# Patient Record
Sex: FEMALE | Race: WHITE
[De-identification: names, ages, dates, MRNs, and addresses within clinical notes are randomized per-mention and may not be internally consistent; named-entity substitution may affect disease eponyms.]

---

## 2020-09-28 ENCOUNTER — APPOINTMENT (OUTPATIENT)
Dept: FAMILY MEDICINE | Facility: CLINIC | Age: 25
End: 2020-09-28
Payer: COMMERCIAL

## 2020-09-28 VITALS
WEIGHT: 212 LBS | TEMPERATURE: 98 F | SYSTOLIC BLOOD PRESSURE: 118 MMHG | HEART RATE: 99 BPM | DIASTOLIC BLOOD PRESSURE: 70 MMHG | HEIGHT: 64 IN | BODY MASS INDEX: 36.19 KG/M2 | OXYGEN SATURATION: 98 %

## 2020-09-28 DIAGNOSIS — R63.5 ABNORMAL WEIGHT GAIN: ICD-10-CM

## 2020-09-28 DIAGNOSIS — Z23 ENCOUNTER FOR IMMUNIZATION: ICD-10-CM

## 2020-09-28 DIAGNOSIS — Z76.89 PERSONS ENCOUNTERING HEALTH SERVICES IN OTHER SPECIFIED CIRCUMSTANCES: ICD-10-CM

## 2020-09-28 DIAGNOSIS — Z78.9 OTHER SPECIFIED HEALTH STATUS: ICD-10-CM

## 2020-09-28 PROCEDURE — G0008: CPT

## 2020-09-28 PROCEDURE — 96127 BRIEF EMOTIONAL/BEHAV ASSMT: CPT

## 2020-09-28 PROCEDURE — 99204 OFFICE O/P NEW MOD 45 MIN: CPT | Mod: 25

## 2020-09-28 PROCEDURE — 90686 IIV4 VACC NO PRSV 0.5 ML IM: CPT

## 2020-09-28 NOTE — PHYSICAL EXAM
[No Acute Distress] : no acute distress [Well-Appearing] : well-appearing [Normal Sclera/Conjunctiva] : normal sclera/conjunctiva [PERRL] : pupils equal round and reactive to light [Normal Outer Ear/Nose] : the outer ears and nose were normal in appearance [Normal Oropharynx] : the oropharynx was normal [No Lymphadenopathy] : no lymphadenopathy [Supple] : supple [No Respiratory Distress] : no respiratory distress  [No Accessory Muscle Use] : no accessory muscle use [Clear to Auscultation] : lungs were clear to auscultation bilaterally [Normal Rate] : normal rate  [Regular Rhythm] : with a regular rhythm [Soft] : abdomen soft [Non Tender] : non-tender [Non-distended] : non-distended [Normal Bowel Sounds] : normal bowel sounds [No Joint Swelling] : no joint swelling [Grossly Normal Strength/Tone] : grossly normal strength/tone [No Rash] : no rash [No Focal Deficits] : no focal deficits

## 2020-09-28 NOTE — PLAN
[FreeTextEntry1] : Bipolar d/o\par - cont f/u with psych\par - cont current meds\par \par Obesity/Weight gain\par - recommend against weight loss medications given mental health hx\par - stressed importance of diet and exercise \par \par HCM\par - f/u labs\par - GYN UTD\par - Flu shot administered. Pt tolerated well\par 
Yes

## 2020-09-28 NOTE — HEALTH RISK ASSESSMENT
[] : Yes [0-5] : 0-5 [Yes] : Yes [Patient reported PAP Smear was normal] : Patient reported PAP Smear was normal [de-identified] : 6 years x 1/2 PPD [de-identified] : rare  [PapSmearDate] : 06/20

## 2020-09-29 LAB
ALBUMIN SERPL ELPH-MCNC: 4.7 G/DL
ALP BLD-CCNC: 63 U/L
ALT SERPL-CCNC: 11 U/L
ANION GAP SERPL CALC-SCNC: 15 MMOL/L
AST SERPL-CCNC: 15 U/L
BASOPHILS # BLD AUTO: 0.07 K/UL
BASOPHILS NFR BLD AUTO: 0.8 %
BILIRUB SERPL-MCNC: 0.3 MG/DL
BUN SERPL-MCNC: 10 MG/DL
CALCIUM SERPL-MCNC: 10.2 MG/DL
CHLORIDE SERPL-SCNC: 100 MMOL/L
CO2 SERPL-SCNC: 22 MMOL/L
CREAT SERPL-MCNC: 0.87 MG/DL
EOSINOPHIL # BLD AUTO: 0.16 K/UL
EOSINOPHIL NFR BLD AUTO: 1.8 %
ESTIMATED AVERAGE GLUCOSE: 91 MG/DL
GLUCOSE SERPL-MCNC: 80 MG/DL
HBA1C MFR BLD HPLC: 4.8 %
HCT VFR BLD CALC: 44.6 %
HGB BLD-MCNC: 14.3 G/DL
IMM GRANULOCYTES NFR BLD AUTO: 0.2 %
LYMPHOCYTES # BLD AUTO: 1.51 K/UL
LYMPHOCYTES NFR BLD AUTO: 16.6 %
MAN DIFF?: NORMAL
MCHC RBC-ENTMCNC: 29.5 PG
MCHC RBC-ENTMCNC: 32.1 GM/DL
MCV RBC AUTO: 92 FL
MONOCYTES # BLD AUTO: 0.79 K/UL
MONOCYTES NFR BLD AUTO: 8.7 %
NEUTROPHILS # BLD AUTO: 6.52 K/UL
NEUTROPHILS NFR BLD AUTO: 71.9 %
PLATELET # BLD AUTO: 377 K/UL
POTASSIUM SERPL-SCNC: 4.5 MMOL/L
PROT SERPL-MCNC: 7.8 G/DL
RBC # BLD: 4.85 M/UL
RBC # FLD: 12.6 %
SODIUM SERPL-SCNC: 138 MMOL/L
TSH SERPL-ACNC: 1.31 UIU/ML
WBC # FLD AUTO: 9.07 K/UL

## 2020-10-01 LAB — 25(OH)D3 SERPL-MCNC: 24.4 NG/ML

## 2020-10-06 ENCOUNTER — TRANSCRIPTION ENCOUNTER (OUTPATIENT)
Age: 25
End: 2020-10-06

## 2020-10-19 ENCOUNTER — APPOINTMENT (OUTPATIENT)
Dept: FAMILY MEDICINE | Facility: CLINIC | Age: 25
End: 2020-10-19
Payer: COMMERCIAL

## 2020-10-19 VITALS
WEIGHT: 206 LBS | HEART RATE: 96 BPM | TEMPERATURE: 97.3 F | DIASTOLIC BLOOD PRESSURE: 70 MMHG | BODY MASS INDEX: 35.17 KG/M2 | SYSTOLIC BLOOD PRESSURE: 112 MMHG | HEIGHT: 64 IN | OXYGEN SATURATION: 98 %

## 2020-10-19 PROCEDURE — 99072 ADDL SUPL MATRL&STAF TM PHE: CPT

## 2020-10-19 PROCEDURE — 99395 PREV VISIT EST AGE 18-39: CPT | Mod: 25

## 2020-10-19 RX ORDER — GABAPENTIN 300 MG/1
300 CAPSULE ORAL
Qty: 30 | Refills: 0 | Status: COMPLETED | COMMUNITY
Start: 2020-05-20

## 2020-10-19 RX ORDER — TOPIRAMATE 50 MG/1
50 TABLET, FILM COATED ORAL
Qty: 60 | Refills: 0 | Status: COMPLETED | COMMUNITY
Start: 2020-09-19

## 2020-10-19 RX ORDER — TOPIRAMATE 25 MG/1
25 TABLET, FILM COATED ORAL
Qty: 60 | Refills: 0 | Status: COMPLETED | COMMUNITY
Start: 2020-09-12

## 2020-10-19 RX ORDER — POLYMYXIN B SULFATE AND TRIMETHOPRIM 10000; 1 [USP'U]/ML; MG/ML
10000-0.1 SOLUTION OPHTHALMIC
Qty: 10 | Refills: 0 | Status: COMPLETED | COMMUNITY
Start: 2020-10-04

## 2020-10-19 RX ORDER — HYDROXYZINE PAMOATE 25 MG/1
25 CAPSULE ORAL
Qty: 90 | Refills: 0 | Status: COMPLETED | COMMUNITY
Start: 2020-05-28

## 2020-10-19 RX ORDER — NITROFURANTOIN (MONOHYDRATE/MACROCRYSTALS) 25; 75 MG/1; MG/1
100 CAPSULE ORAL
Qty: 14 | Refills: 0 | Status: COMPLETED | COMMUNITY
Start: 2020-07-14

## 2020-10-19 RX ORDER — ORAL SEMAGLUTIDE 3 MG/1
3 TABLET ORAL
Qty: 90 | Refills: 0 | Status: COMPLETED | COMMUNITY
Start: 2020-09-05

## 2020-10-19 RX ORDER — LAMOTRIGINE 25 MG/1
25 TABLET ORAL
Qty: 60 | Refills: 0 | Status: COMPLETED | COMMUNITY
Start: 2020-06-16

## 2020-10-19 RX ORDER — TRAZODONE HYDROCHLORIDE 50 MG/1
50 TABLET ORAL
Qty: 15 | Refills: 0 | Status: COMPLETED | COMMUNITY
Start: 2020-05-28

## 2020-10-19 NOTE — PLAN
[FreeTextEntry1] : Bipolar d/o\par - managed by psych\par \par HCM\par - GYN 2019\par - labs unremarkable\par - f/u quant gold, forms to be completed when results are in

## 2020-10-19 NOTE — HISTORY OF PRESENT ILLNESS
[FreeTextEntry1] : CPE, forms [de-identified] : 24 year old female presents for CPE and forms. She needs tb testing. Has had recent labs. Feels well with no concerns. Is followed by psych and has had medication adjusted.

## 2020-10-19 NOTE — PHYSICAL EXAM
[No Acute Distress] : no acute distress [Well-Appearing] : well-appearing [Normal Sclera/Conjunctiva] : normal sclera/conjunctiva [PERRL] : pupils equal round and reactive to light [No Respiratory Distress] : no respiratory distress  [Normal Outer Ear/Nose] : the outer ears and nose were normal in appearance [Clear to Auscultation] : lungs were clear to auscultation bilaterally [No Accessory Muscle Use] : no accessory muscle use [Normal Rate] : normal rate  [Regular Rhythm] : with a regular rhythm [No Rash] : no rash [No Focal Deficits] : no focal deficits

## 2020-10-22 LAB
M TB IFN-G BLD-IMP: NEGATIVE
QUANTIFERON TB PLUS MITOGEN MINUS NIL: 2.47 IU/ML
QUANTIFERON TB PLUS NIL: 0.01 IU/ML
QUANTIFERON TB PLUS TB1 MINUS NIL: 0 IU/ML
QUANTIFERON TB PLUS TB2 MINUS NIL: 0 IU/ML

## 2020-10-30 ENCOUNTER — APPOINTMENT (OUTPATIENT)
Dept: FAMILY MEDICINE | Facility: CLINIC | Age: 25
End: 2020-10-30
Payer: COMMERCIAL

## 2020-10-30 VITALS
SYSTOLIC BLOOD PRESSURE: 110 MMHG | HEIGHT: 64 IN | TEMPERATURE: 98.7 F | HEART RATE: 114 BPM | WEIGHT: 200 LBS | OXYGEN SATURATION: 99 % | DIASTOLIC BLOOD PRESSURE: 70 MMHG | BODY MASS INDEX: 34.15 KG/M2

## 2020-10-30 DIAGNOSIS — F32.9 ANXIETY DISORDER, UNSPECIFIED: ICD-10-CM

## 2020-10-30 DIAGNOSIS — F41.9 ANXIETY DISORDER, UNSPECIFIED: ICD-10-CM

## 2020-10-30 PROCEDURE — 99213 OFFICE O/P EST LOW 20 MIN: CPT

## 2020-10-30 PROCEDURE — 99072 ADDL SUPL MATRL&STAF TM PHE: CPT

## 2020-10-30 NOTE — PHYSICAL EXAM
[No Acute Distress] : no acute distress [Well-Appearing] : well-appearing [Normal Sclera/Conjunctiva] : normal sclera/conjunctiva [PERRL] : pupils equal round and reactive to light [Normal Outer Ear/Nose] : the outer ears and nose were normal in appearance [No Respiratory Distress] : no respiratory distress  [No Accessory Muscle Use] : no accessory muscle use [Clear to Auscultation] : lungs were clear to auscultation bilaterally [Normal Rate] : normal rate  [Regular Rhythm] : with a regular rhythm [de-identified] : flat affect

## 2020-10-30 NOTE — PLAN
[FreeTextEntry1] : Advised due to her bipolar depression and medication regimen, her anxiety needs to be managed by her psychiatrist and that I am unable to prescribe medication at this time. Encourage to followup with psychiatrist and express concerns. Also recommend extra therapy sessions, participate in yoga and meditating

## 2020-10-30 NOTE — HISTORY OF PRESENT ILLNESS
[FreeTextEntry8] : 24 year old female presents for anxiety. States she has spoken to her psychiatrist and they have not been able to help or change medication. She has discussed with her psychologist who also said to f/u with her psyciatrist. She is from [place] and living with her dad that has been causing anxiety. She plans to move back to [place] in 2-3 weeks

## 2021-06-24 ENCOUNTER — APPOINTMENT (OUTPATIENT)
Dept: CT IMAGING | Facility: CLINIC | Age: 26
End: 2021-06-24

## 2021-11-17 ENCOUNTER — APPOINTMENT (OUTPATIENT)
Dept: FAMILY MEDICINE | Facility: CLINIC | Age: 26
End: 2021-11-17
Payer: COMMERCIAL

## 2021-11-17 VITALS
DIASTOLIC BLOOD PRESSURE: 70 MMHG | SYSTOLIC BLOOD PRESSURE: 100 MMHG | HEIGHT: 64 IN | HEART RATE: 107 BPM | TEMPERATURE: 98 F | BODY MASS INDEX: 40.12 KG/M2 | WEIGHT: 235 LBS | OXYGEN SATURATION: 99 % | RESPIRATION RATE: 16 BRPM

## 2021-11-17 DIAGNOSIS — Z78.9 OTHER SPECIFIED HEALTH STATUS: ICD-10-CM

## 2021-11-17 DIAGNOSIS — Z13.228 ENCOUNTER FOR SCREENING FOR OTHER SUSPECTED ENDOCRINE DISORDER: ICD-10-CM

## 2021-11-17 DIAGNOSIS — Z13.0 ENCOUNTER FOR SCREENING FOR OTHER SUSPECTED ENDOCRINE DISORDER: ICD-10-CM

## 2021-11-17 DIAGNOSIS — Z13.29 ENCOUNTER FOR SCREENING FOR OTHER SUSPECTED ENDOCRINE DISORDER: ICD-10-CM

## 2021-11-17 DIAGNOSIS — Z00.00 ENCOUNTER FOR GENERAL ADULT MEDICAL EXAMINATION W/OUT ABNORMAL FINDINGS: ICD-10-CM

## 2021-11-17 PROCEDURE — 99395 PREV VISIT EST AGE 18-39: CPT | Mod: 25

## 2021-11-17 PROCEDURE — 96127 BRIEF EMOTIONAL/BEHAV ASSMT: CPT

## 2021-11-17 NOTE — REVIEW OF SYSTEMS
[Suicidal] : not suicidal [Insomnia] : insomnia [Anxiety] : anxiety [Depression] : depression [Negative] : Gastrointestinal

## 2021-11-17 NOTE — HISTORY OF PRESENT ILLNESS
[de-identified] : Pt presenting for CPE\par doing well\par Hx of anxiety/depression/bipolar \par Sees psych \par \par Sees weight loss specialist- currently on ozempic x 2 months \par \par Needs quantiferon and MMR titers\par

## 2021-11-17 NOTE — HEALTH RISK ASSESSMENT
[Good] : ~his/her~  mood as  good [] : No [Yes] : Yes [No] : In the past 12 months have you used drugs other than those required for medical reasons? No [0] : 2) Feeling down, depressed, or hopeless: Not at all (0)

## 2021-11-17 NOTE — ASSESSMENT
[FreeTextEntry1] : Pt presenting for annual physical. \par \par Reviewed diet, exercise (150 min/moderate intensity exercise weekly), lifestyle modifications. \par Discussed STD prevention and discussed screening studies per below:\par \par \par Cervical Cancer- Pap\par \par Labs ordered per above. \par f.u in 1 year or earlier if needed \par \par \par will call patient when MMR and quaniteron titers available- pt will need to come to office to  of results

## 2021-11-18 LAB
25(OH)D3 SERPL-MCNC: 10.9 NG/ML
ALBUMIN SERPL ELPH-MCNC: 4.5 G/DL
ALP BLD-CCNC: 57 U/L
ALT SERPL-CCNC: 16 U/L
ANION GAP SERPL CALC-SCNC: 17 MMOL/L
AST SERPL-CCNC: 14 U/L
BASOPHILS # BLD AUTO: 0.07 K/UL
BASOPHILS NFR BLD AUTO: 0.6 %
BILIRUB SERPL-MCNC: 0.2 MG/DL
BUN SERPL-MCNC: 12 MG/DL
CALCIUM SERPL-MCNC: 9.9 MG/DL
CHLORIDE SERPL-SCNC: 103 MMOL/L
CHOLEST SERPL-MCNC: 175 MG/DL
CO2 SERPL-SCNC: 20 MMOL/L
CREAT SERPL-MCNC: 0.86 MG/DL
EOSINOPHIL # BLD AUTO: 0.09 K/UL
EOSINOPHIL NFR BLD AUTO: 0.8 %
ESTIMATED AVERAGE GLUCOSE: 85 MG/DL
GLUCOSE SERPL-MCNC: 80 MG/DL
HBA1C MFR BLD HPLC: 4.6 %
HCT VFR BLD CALC: 44.5 %
HDLC SERPL-MCNC: 49 MG/DL
HGB BLD-MCNC: 15 G/DL
IMM GRANULOCYTES NFR BLD AUTO: 0.5 %
LDLC SERPL CALC-MCNC: 69 MG/DL
LYMPHOCYTES # BLD AUTO: 2.71 K/UL
LYMPHOCYTES NFR BLD AUTO: 24.5 %
MAN DIFF?: NORMAL
MCHC RBC-ENTMCNC: 31.3 PG
MCHC RBC-ENTMCNC: 33.7 GM/DL
MCV RBC AUTO: 92.9 FL
MEV IGG FLD QL IA: 40.4 AU/ML
MEV IGG+IGM SER-IMP: POSITIVE
MONOCYTES # BLD AUTO: 0.85 K/UL
MONOCYTES NFR BLD AUTO: 7.7 %
MUV AB SER-ACNC: POSITIVE
MUV IGG SER QL IA: 176 AU/ML
NEUTROPHILS # BLD AUTO: 7.3 K/UL
NEUTROPHILS NFR BLD AUTO: 65.9 %
NONHDLC SERPL-MCNC: 126 MG/DL
PLATELET # BLD AUTO: 403 K/UL
POTASSIUM SERPL-SCNC: 4.6 MMOL/L
PROT SERPL-MCNC: 7.6 G/DL
RBC # BLD: 4.79 M/UL
RBC # FLD: 12.4 %
RUBV IGG FLD-ACNC: 5.7 INDEX
RUBV IGG SER-IMP: POSITIVE
SODIUM SERPL-SCNC: 140 MMOL/L
TRIGL SERPL-MCNC: 283 MG/DL
TSH SERPL-ACNC: 1.48 UIU/ML
WBC # FLD AUTO: 11.07 K/UL

## 2021-11-18 RX ORDER — CHOLECALCIFEROL (VITAMIN D3) 1250 MCG
1.25 MG CAPSULE ORAL
Qty: 12 | Refills: 0 | Status: ACTIVE | COMMUNITY
Start: 2021-11-18 | End: 1900-01-01

## 2021-12-02 LAB
M TB IFN-G BLD-IMP: NEGATIVE
QUANTIFERON TB PLUS MITOGEN MINUS NIL: 8.79 IU/ML
QUANTIFERON TB PLUS NIL: 0.01 IU/ML
QUANTIFERON TB PLUS TB1 MINUS NIL: 0 IU/ML
QUANTIFERON TB PLUS TB2 MINUS NIL: 0.02 IU/ML

## 2021-12-30 ENCOUNTER — APPOINTMENT (OUTPATIENT)
Dept: FAMILY MEDICINE | Facility: CLINIC | Age: 26
End: 2021-12-30

## 2022-01-07 ENCOUNTER — APPOINTMENT (OUTPATIENT)
Dept: FAMILY MEDICINE | Facility: CLINIC | Age: 27
End: 2022-01-07

## 2022-04-02 ENCOUNTER — APPOINTMENT (OUTPATIENT)
Dept: FAMILY MEDICINE | Facility: CLINIC | Age: 27
End: 2022-04-02
Payer: COMMERCIAL

## 2022-04-02 VITALS
HEART RATE: 60 BPM | OXYGEN SATURATION: 98 % | WEIGHT: 246 LBS | HEIGHT: 64 IN | BODY MASS INDEX: 42 KG/M2 | SYSTOLIC BLOOD PRESSURE: 110 MMHG | TEMPERATURE: 97 F | DIASTOLIC BLOOD PRESSURE: 72 MMHG

## 2022-04-02 DIAGNOSIS — J30.9 ALLERGIC RHINITIS, UNSPECIFIED: ICD-10-CM

## 2022-04-02 DIAGNOSIS — Z13.31 ENCOUNTER FOR SCREENING FOR DEPRESSION: ICD-10-CM

## 2022-04-02 PROCEDURE — 96127 BRIEF EMOTIONAL/BEHAV ASSMT: CPT

## 2022-04-02 PROCEDURE — 90715 TDAP VACCINE 7 YRS/> IM: CPT

## 2022-04-02 PROCEDURE — 99395 PREV VISIT EST AGE 18-39: CPT | Mod: 25

## 2022-04-02 PROCEDURE — 90471 IMMUNIZATION ADMIN: CPT

## 2022-04-02 NOTE — HEALTH RISK ASSESSMENT
[Never] : Never [No] : No [0] : 2) Feeling down, depressed, or hopeless: Not at all (0) [PHQ-2 Negative - No further assessment needed] : PHQ-2 Negative - No further assessment needed [Patient reported mammogram was normal] : Patient reported mammogram was normal [Patient reported PAP Smear was normal] : Patient reported PAP Smear was normal [de-identified] : tries to exercise  [de-identified] : eats balanced [[id number]] : 0 [MammogramDate] : 01/22 [PapSmearDate] : 12/21

## 2022-04-02 NOTE — PLAN
[FreeTextEntry1] : Bipolar\par - follows with psych\par \par Congestion\par - likely allergies\par - trial of azelastine\par - can use ayr nasal spray for dryness\par - zyrtec \par \par HCM\par - GYN referral\par - had labs 11/21\par - encourage healthy diet and exercise\par \par f/u in 1 yr or PRN

## 2022-04-28 ENCOUNTER — TRANSCRIPTION ENCOUNTER (OUTPATIENT)
Age: 27
End: 2022-04-28

## 2022-04-28 ENCOUNTER — APPOINTMENT (OUTPATIENT)
Dept: FAMILY MEDICINE | Facility: CLINIC | Age: 27
End: 2022-04-28
Payer: MEDICARE

## 2022-04-28 DIAGNOSIS — B34.9 VIRAL INFECTION, UNSPECIFIED: ICD-10-CM

## 2022-04-28 PROCEDURE — 99213 OFFICE O/P EST LOW 20 MIN: CPT | Mod: 95

## 2022-04-28 NOTE — PHYSICAL EXAM
[No Acute Distress] : no acute distress [Normal Sclera/Conjunctiva] : normal sclera/conjunctiva [Normal Outer Ear/Nose] : the outer ears and nose were normal in appearance [No Respiratory Distress] : no respiratory distress  [No Accessory Muscle Use] : no accessory muscle use [Normal Affect] : the affect was normal [Normal Insight/Judgement] : insight and judgment were intact

## 2022-04-28 NOTE — HISTORY OF PRESENT ILLNESS
[Home] : at home, [unfilled] , at the time of the visit. [Medical Office: ([facility])___] : at the medical office located in  [FreeTextEntry8] : 26 year old female with 2 days of fever, aches, cough and congestion. Has been taking tylenol and advil

## 2022-04-28 NOTE — PLAN
[FreeTextEntry1] : Viral syndrome\par - suspect Flu\par - start tamiflu\par - f/u viral testing\par - cont hydration, tylenol/advil\par - f/u if worsening

## 2022-04-29 LAB
INFLUENZA A RESULT: NOT DETECTED
INFLUENZA B RESULT: NOT DETECTED
RESP SYN VIRUS RESULT: NOT DETECTED
SARS-COV-2 RESULT: NOT DETECTED

## 2022-04-29 RX ORDER — OSELTAMIVIR PHOSPHATE 75 MG/1
75 CAPSULE ORAL TWICE DAILY
Qty: 10 | Refills: 0 | Status: COMPLETED | COMMUNITY
Start: 2022-04-28 | End: 2022-04-29

## 2022-06-13 ENCOUNTER — APPOINTMENT (OUTPATIENT)
Dept: OBGYN | Facility: CLINIC | Age: 27
End: 2022-06-13
Payer: MEDICARE

## 2022-06-13 VITALS
DIASTOLIC BLOOD PRESSURE: 72 MMHG | SYSTOLIC BLOOD PRESSURE: 118 MMHG | WEIGHT: 250 LBS | HEIGHT: 64 IN | BODY MASS INDEX: 42.68 KG/M2

## 2022-06-13 DIAGNOSIS — Z32.02 ENCOUNTER FOR PREGNANCY TEST, RESULT NEGATIVE: ICD-10-CM

## 2022-06-13 LAB
HCG UR QL: NEGATIVE
QUALITY CONTROL: YES

## 2022-06-13 PROCEDURE — 99203 OFFICE O/P NEW LOW 30 MIN: CPT

## 2022-06-13 PROCEDURE — 81025 URINE PREGNANCY TEST: CPT

## 2022-06-13 NOTE — DISCUSSION/SUMMARY
[FreeTextEntry1] : 25 YO PRESENTS FOR OCP INITIATION. NO COMPLAINTS OR CONCERNS AT THIS TIME. \par UCG- NEGATIVE \par \par CBE WNL, SBE TEACHING CONDUCTED WITH RETURN DEMONSTRATION\par \par CONTRACEPTION OPTIONS DISCUSSED INCLUDING BUT NOT LIMITED TO PATCH, DEPO PROVERA, IMPLANTATION, IUD AND NUVA RING \par EDUCATION ON SAFE SEX PRACTICES INCLUDING ADMINISTRATION OF PILLS AND CONDOM USE, PATIENT STATES UNDERSTANDING\par \par PATIENT DECLINES STD TESTING AT THIS TIME \par PATIENT DENIES HX OF MIGRAINES WITH AURAS, PE, DVT\par \par ACHES DISCUSSED IN GREAT DETAIL, PATIENT STATES UNDERSTANDING.\par OCP LOESTRIN RX SENT TO PHARMACY WITH INSTRUCTIONS ON USE \par ALL QUESTIONS ANSWERED TO PATIENT SATISFACTION \par \par RTO IN 3 MOS FOR ANNUAL OR PRN \par \par

## 2022-08-31 ENCOUNTER — TRANSCRIPTION ENCOUNTER (OUTPATIENT)
Age: 27
End: 2022-08-31

## 2022-10-27 ENCOUNTER — NON-APPOINTMENT (OUTPATIENT)
Age: 27
End: 2022-10-27

## 2022-11-12 ENCOUNTER — INPATIENT (INPATIENT)
Facility: HOSPITAL | Age: 27
LOS: 31 days | Discharge: HOME | End: 2022-12-14
Attending: STUDENT IN AN ORGANIZED HEALTH CARE EDUCATION/TRAINING PROGRAM | Admitting: PSYCHIATRY & NEUROLOGY
Payer: COMMERCIAL

## 2022-11-12 VITALS
RESPIRATION RATE: 20 BRPM | TEMPERATURE: 98 F | DIASTOLIC BLOOD PRESSURE: 61 MMHG | SYSTOLIC BLOOD PRESSURE: 125 MMHG | OXYGEN SATURATION: 100 % | HEART RATE: 96 BPM

## 2022-11-12 DIAGNOSIS — F60.3 BORDERLINE PERSONALITY DISORDER: ICD-10-CM

## 2022-11-12 DIAGNOSIS — F29 UNSPECIFIED PSYCHOSIS NOT DUE TO A SUBSTANCE OR KNOWN PHYSIOLOGICAL CONDITION: ICD-10-CM

## 2022-11-12 LAB
ALBUMIN SERPL ELPH-MCNC: 4.2 G/DL — SIGNIFICANT CHANGE UP (ref 3.3–5)
ALP SERPL-CCNC: 53 U/L — SIGNIFICANT CHANGE UP (ref 40–120)
ALT FLD-CCNC: 23 U/L — SIGNIFICANT CHANGE UP (ref 4–33)
AMPHET UR-MCNC: NEGATIVE — SIGNIFICANT CHANGE UP
ANION GAP SERPL CALC-SCNC: 13 MMOL/L — SIGNIFICANT CHANGE UP (ref 7–14)
APAP SERPL-MCNC: <10 UG/ML — LOW (ref 15–25)
APPEARANCE UR: ABNORMAL
AST SERPL-CCNC: 21 U/L — SIGNIFICANT CHANGE UP (ref 4–32)
B PERT DNA SPEC QL NAA+PROBE: SIGNIFICANT CHANGE UP
B PERT+PARAPERT DNA PNL SPEC NAA+PROBE: SIGNIFICANT CHANGE UP
BACTERIA # UR AUTO: ABNORMAL
BARBITURATES UR SCN-MCNC: NEGATIVE — SIGNIFICANT CHANGE UP
BASOPHILS # BLD AUTO: 0 K/UL — SIGNIFICANT CHANGE UP (ref 0–0.2)
BASOPHILS NFR BLD AUTO: 0 % — SIGNIFICANT CHANGE UP (ref 0–2)
BENZODIAZ UR-MCNC: NEGATIVE — SIGNIFICANT CHANGE UP
BILIRUB SERPL-MCNC: 0.4 MG/DL — SIGNIFICANT CHANGE UP (ref 0.2–1.2)
BILIRUB UR-MCNC: NEGATIVE — SIGNIFICANT CHANGE UP
BORDETELLA PARAPERTUSSIS (RAPRVP): SIGNIFICANT CHANGE UP
BUN SERPL-MCNC: 6 MG/DL — LOW (ref 7–23)
C PNEUM DNA SPEC QL NAA+PROBE: SIGNIFICANT CHANGE UP
CALCIUM SERPL-MCNC: 9.7 MG/DL — SIGNIFICANT CHANGE UP (ref 8.4–10.5)
CHLORIDE SERPL-SCNC: 100 MMOL/L — SIGNIFICANT CHANGE UP (ref 98–107)
CO2 SERPL-SCNC: 23 MMOL/L — SIGNIFICANT CHANGE UP (ref 22–31)
COCAINE METAB.OTHER UR-MCNC: NEGATIVE — SIGNIFICANT CHANGE UP
COLOR SPEC: YELLOW — SIGNIFICANT CHANGE UP
COVID-19 SPIKE DOMAIN AB INTERP: POSITIVE
COVID-19 SPIKE DOMAIN ANTIBODY RESULT: >250 U/ML — HIGH
CREAT SERPL-MCNC: 0.77 MG/DL — SIGNIFICANT CHANGE UP (ref 0.5–1.3)
CREATININE URINE RESULT, DAU: 102 MG/DL — SIGNIFICANT CHANGE UP
DIFF PNL FLD: ABNORMAL
EGFR: 109 ML/MIN/1.73M2 — SIGNIFICANT CHANGE UP
EOSINOPHIL # BLD AUTO: 0.17 K/UL — SIGNIFICANT CHANGE UP (ref 0–0.5)
EOSINOPHIL NFR BLD AUTO: 1.7 % — SIGNIFICANT CHANGE UP (ref 0–6)
EPI CELLS # UR: ABNORMAL
ETHANOL SERPL-MCNC: <10 MG/DL — SIGNIFICANT CHANGE UP
FLUAV SUBTYP SPEC NAA+PROBE: SIGNIFICANT CHANGE UP
FLUBV RNA SPEC QL NAA+PROBE: SIGNIFICANT CHANGE UP
GIANT PLATELETS BLD QL SMEAR: PRESENT — SIGNIFICANT CHANGE UP
GLUCOSE SERPL-MCNC: 92 MG/DL — SIGNIFICANT CHANGE UP (ref 70–99)
GLUCOSE UR QL: NEGATIVE — SIGNIFICANT CHANGE UP
HADV DNA SPEC QL NAA+PROBE: SIGNIFICANT CHANGE UP
HCOV 229E RNA SPEC QL NAA+PROBE: SIGNIFICANT CHANGE UP
HCOV HKU1 RNA SPEC QL NAA+PROBE: SIGNIFICANT CHANGE UP
HCOV NL63 RNA SPEC QL NAA+PROBE: SIGNIFICANT CHANGE UP
HCOV OC43 RNA SPEC QL NAA+PROBE: SIGNIFICANT CHANGE UP
HCT VFR BLD CALC: 41.8 % — SIGNIFICANT CHANGE UP (ref 34.5–45)
HGB BLD-MCNC: 13.9 G/DL — SIGNIFICANT CHANGE UP (ref 11.5–15.5)
HMPV RNA SPEC QL NAA+PROBE: SIGNIFICANT CHANGE UP
HPIV1 RNA SPEC QL NAA+PROBE: SIGNIFICANT CHANGE UP
HPIV2 RNA SPEC QL NAA+PROBE: SIGNIFICANT CHANGE UP
HPIV3 RNA SPEC QL NAA+PROBE: SIGNIFICANT CHANGE UP
HPIV4 RNA SPEC QL NAA+PROBE: SIGNIFICANT CHANGE UP
HYALINE CASTS # UR AUTO: 1 /LPF — SIGNIFICANT CHANGE UP (ref 0–7)
IANC: 3.81 K/UL — SIGNIFICANT CHANGE UP (ref 1.8–7.4)
KETONES UR-MCNC: NEGATIVE — SIGNIFICANT CHANGE UP
LEUKOCYTE ESTERASE UR-ACNC: ABNORMAL
LYMPHOCYTES # BLD AUTO: 3.66 K/UL — HIGH (ref 1–3.3)
LYMPHOCYTES # BLD AUTO: 37.1 % — SIGNIFICANT CHANGE UP (ref 13–44)
M PNEUMO DNA SPEC QL NAA+PROBE: SIGNIFICANT CHANGE UP
MCHC RBC-ENTMCNC: 29.9 PG — SIGNIFICANT CHANGE UP (ref 27–34)
MCHC RBC-ENTMCNC: 33.3 GM/DL — SIGNIFICANT CHANGE UP (ref 32–36)
MCV RBC AUTO: 89.9 FL — SIGNIFICANT CHANGE UP (ref 80–100)
METHADONE UR-MCNC: POSITIVE
MONOCYTES # BLD AUTO: 0.68 K/UL — SIGNIFICANT CHANGE UP (ref 0–0.9)
MONOCYTES NFR BLD AUTO: 6.9 % — SIGNIFICANT CHANGE UP (ref 2–14)
NEUTROPHILS # BLD AUTO: 4.34 K/UL — SIGNIFICANT CHANGE UP (ref 1.8–7.4)
NEUTROPHILS NFR BLD AUTO: 44 % — SIGNIFICANT CHANGE UP (ref 43–77)
NITRITE UR-MCNC: NEGATIVE — SIGNIFICANT CHANGE UP
OPIATES UR-MCNC: NEGATIVE — SIGNIFICANT CHANGE UP
OXYCODONE UR-MCNC: NEGATIVE — SIGNIFICANT CHANGE UP
PCP SPEC-MCNC: SIGNIFICANT CHANGE UP
PCP UR-MCNC: NEGATIVE — SIGNIFICANT CHANGE UP
PH UR: 6.5 — SIGNIFICANT CHANGE UP (ref 5–8)
PLAT MORPH BLD: NORMAL — SIGNIFICANT CHANGE UP
PLATELET # BLD AUTO: 352 K/UL — SIGNIFICANT CHANGE UP (ref 150–400)
PLATELET COUNT - ESTIMATE: NORMAL — SIGNIFICANT CHANGE UP
POTASSIUM SERPL-MCNC: 3.8 MMOL/L — SIGNIFICANT CHANGE UP (ref 3.5–5.3)
POTASSIUM SERPL-SCNC: 3.8 MMOL/L — SIGNIFICANT CHANGE UP (ref 3.5–5.3)
PROT SERPL-MCNC: 8.1 G/DL — SIGNIFICANT CHANGE UP (ref 6–8.3)
PROT UR-MCNC: ABNORMAL
RAPID RVP RESULT: SIGNIFICANT CHANGE UP
RBC # BLD: 4.65 M/UL — SIGNIFICANT CHANGE UP (ref 3.8–5.2)
RBC # FLD: 13.7 % — SIGNIFICANT CHANGE UP (ref 10.3–14.5)
RBC BLD AUTO: NORMAL — SIGNIFICANT CHANGE UP
RBC CASTS # UR COMP ASSIST: 3 /HPF — SIGNIFICANT CHANGE UP (ref 0–4)
RSV RNA SPEC QL NAA+PROBE: SIGNIFICANT CHANGE UP
RV+EV RNA SPEC QL NAA+PROBE: SIGNIFICANT CHANGE UP
SALICYLATES SERPL-MCNC: <0.3 MG/DL — LOW (ref 15–30)
SARS-COV-2 IGG+IGM SERPL QL IA: >250 U/ML — HIGH
SARS-COV-2 IGG+IGM SERPL QL IA: POSITIVE
SARS-COV-2 RNA SPEC QL NAA+PROBE: SIGNIFICANT CHANGE UP
SODIUM SERPL-SCNC: 136 MMOL/L — SIGNIFICANT CHANGE UP (ref 135–145)
SP GR SPEC: 1.01 — SIGNIFICANT CHANGE UP (ref 1.01–1.05)
THC UR QL: NEGATIVE — SIGNIFICANT CHANGE UP
TOXICOLOGY SCREEN, DRUGS OF ABUSE, SERUM RESULT: SIGNIFICANT CHANGE UP
TSH SERPL-MCNC: 0.41 UIU/ML — SIGNIFICANT CHANGE UP (ref 0.27–4.2)
UROBILINOGEN FLD QL: SIGNIFICANT CHANGE UP
VARIANT LYMPHS # BLD: 10.3 % — HIGH (ref 0–6)
WBC # BLD: 9.86 K/UL — SIGNIFICANT CHANGE UP (ref 3.8–10.5)
WBC # FLD AUTO: 9.86 K/UL — SIGNIFICANT CHANGE UP (ref 3.8–10.5)
WBC UR QL: 26 /HPF — HIGH (ref 0–5)

## 2022-11-12 PROCEDURE — 99285 EMERGENCY DEPT VISIT HI MDM: CPT

## 2022-11-12 PROCEDURE — 99285 EMERGENCY DEPT VISIT HI MDM: CPT | Mod: GC

## 2022-11-12 RX ORDER — GABAPENTIN 400 MG/1
150 CAPSULE ORAL AT BEDTIME
Refills: 0 | Status: DISCONTINUED | OUTPATIENT
Start: 2022-11-12 | End: 2022-11-12

## 2022-11-12 RX ORDER — QUETIAPINE FUMARATE 200 MG/1
50 TABLET, FILM COATED ORAL AT BEDTIME
Refills: 0 | Status: DISCONTINUED | OUTPATIENT
Start: 2022-11-12 | End: 2022-11-28

## 2022-11-12 RX ORDER — HALOPERIDOL DECANOATE 100 MG/ML
5 INJECTION INTRAMUSCULAR EVERY 6 HOURS
Refills: 0 | Status: DISCONTINUED | OUTPATIENT
Start: 2022-11-12 | End: 2022-12-14

## 2022-11-12 RX ORDER — GABAPENTIN 400 MG/1
300 CAPSULE ORAL AT BEDTIME
Refills: 0 | Status: DISCONTINUED | OUTPATIENT
Start: 2022-11-12 | End: 2022-11-14

## 2022-11-12 RX ORDER — HALOPERIDOL DECANOATE 100 MG/ML
5 INJECTION INTRAMUSCULAR ONCE
Refills: 0 | Status: DISCONTINUED | OUTPATIENT
Start: 2022-11-12 | End: 2022-12-14

## 2022-11-12 RX ADMIN — QUETIAPINE FUMARATE 50 MILLIGRAM(S): 200 TABLET, FILM COATED ORAL at 20:33

## 2022-11-12 RX ADMIN — GABAPENTIN 300 MILLIGRAM(S): 400 CAPSULE ORAL at 20:33

## 2022-11-12 NOTE — BH PATIENT PROFILE - HOME MEDICATIONS
cephalexin 500 mg oral capsule , 1 cap(s) orally 2 times a day  traZODone 50 mg oral tablet , 0.5 tab(s) orally once a day (at bedtime), As Needed -for insomnia

## 2022-11-12 NOTE — ED BEHAVIORAL HEALTH ASSESSMENT NOTE - NSBHATTESTCOMMENTATTENDFT_PSY_A_CORE
26 y/o single woman, domiciled with father, no dependents, currently unemployed, PPHx of historical diagnoses of Borderline Personality Disorder, schizoaffective disorder, r/o MDD, Bipolar II Disorder, IED, two prior psychiatric admissions ([facility] 2016, [place] 2018), no suicide attempts, history of self-injury by cutting in past, no legal problems, hx of drug use (Xanax, cocaine, MJ, opiates, hx of inpatient rehab), PMHx denied, BIB father for worsening agitation, aggression, and emotional dysregulation.  She presents with a mental status and some self-report of explosive episodes of anger secondary to irrational triggers, which many secondary to BPD vs intermittent explosive disorder vs Psychosis with no clear etiology.  She is uncooperative on interview, and has no insight making formulation difficult, but the collateral supports concerns for safety of others at home, and she will require an inpatient involuntary admission for stabilization, safety, and ongoing evaluation.      Plan: Admit to [facility] L6 under 9.39, Continue home medications (non formulary via paper request to pharmacy), consider Lamotrigine, Haldol 5mg/Ativan 2mg PO/IM PRN agitation, PA, medical clearance, follow vitals

## 2022-11-12 NOTE — ED ADULT NURSE NOTE - OBJECTIVE STATEMENT
Received pt in  pt bought in by father for erratic behaviour pt irrtiable de Received pt in  pt bought in by father for eval pt irritable with poor frustration tolerance, denies si/hi/avh presently verbal deescalation successful labs/covid collected eval on going.

## 2022-11-12 NOTE — ED PROVIDER NOTE - NSICDXPASTMEDICALHX_GEN_ALL_CORE_FT
PAST MEDICAL HISTORY:  Bipolar disorder     Severe episode of recurrent major depressive disorder, without psychotic features

## 2022-11-12 NOTE — ED BEHAVIORAL HEALTH ASSESSMENT NOTE - OTHER
Unable to engage fully in interview, no abnormal thought content detected during brief interaction dysphoric Patient denies, father reports patient has been responding to AVH

## 2022-11-12 NOTE — BH PATIENT PROFILE - NSPROGENOTHERPROVIDER_GEN_A_NUR
[facility]  [address] [address]  Phone: [phone]         November 27, 2017     Patient: Dana Melendez   YOB: 1974   Date of Visit: 11/27/2017       To Whom It May Concern:    Dana Melendez was seen and treated in our emergency department on 11/27/2017. The following work duties are recommended. She should remain out of work until 11/29/17    Treatment and work recommendations given by the emergency department are initial emergency measures only, and follow up should be arranged as soon as possible with the company's occupational health provider* or the specialist to whom the worker was referred.     *If the company does not have an occupational health provider, follow up should be at [address], [address]  [phone]    Schedule an appointment as soon as possible for a visit in 1 week            Sincerely,        Brittaney Cullen DPM        Signature:__________________________________
mental health services
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
## 2022-11-12 NOTE — ED BEHAVIORAL HEALTH NOTE - BEHAVIORAL HEALTH NOTE
GISSELL BORRERO contacted [employer] at [phone] to obtain authorization and spoke with Danish Peng informed GISSELL that there are no current clinicians and inquired about a call back number. GISSELL provided GISSELL call back number . Reference #[id number]

## 2022-11-12 NOTE — ED PROVIDER NOTE - OBJECTIVE STATEMENT
27-year-old female with a history of bipolar disorder.  Patient brought in by father, Eliot, for change in behavior.  Besides [facility] hospitalization in 2016 patient's had multiple hospitalizations [facility] and lastly at [place] in 2021.  Patient has been off medications since June of this year, self DC'd.  She has been increasingly erratic at home and more difficult to manage.  Father has noted that patient is aggressive verbally to neighbors and family and physically aggressive to herself.  Patient has made multiple statements stating she wants to harm herself but has made no attempts or made any statement of plan.  Father denies that patient is sexually active or doing any drugs.

## 2022-11-12 NOTE — ED BEHAVIORAL HEALTH ASSESSMENT NOTE - DIFFERENTIAL
Borderline personality disorder vs  schizoaffective disorder vs  bipolar disorder vs  Intermittent explosive disorder

## 2022-11-12 NOTE — ED BEHAVIORAL HEALTH ASSESSMENT NOTE - RISK ASSESSMENT
RF: Agitation, aggression, destruction of property, inability to safety plan, hx of NSSIB, possible AVH, recent medication changes, hx of nonadherence, hx of substance abuse, poor insight regarding need for treatment    PF: Stable residence, sobriety Low Acute Suicide Risk

## 2022-11-12 NOTE — ED PROVIDER NOTE - CLINICAL SUMMARY MEDICAL DECISION MAKING FREE TEXT BOX
27-year-old female with decompensated bipolar disorder likely due to stopping medications few months ago.  Patient is increasingly displaying psychotic features at home and likely requires inpatient management.  Case discussed with psychiatry who will evaluate after medical clearance.  Clearance labs ordered.

## 2022-11-12 NOTE — ED BEHAVIORAL HEALTH ASSESSMENT NOTE - DESCRIPTION
Patient is dysregulated and uncooperative in the ED.    Vital Signs Last 24 Hrs  T(C): 36.7 (12 Nov 2022 11:41), Max: 36.7 (12 Nov 2022 11:41)  T(F): 98.1 (12 Nov 2022 11:41), Max: 98.1 (12 Nov 2022 11:41)  HR: 96 (12 Nov 2022 11:41) (96 - 96)  BP: 125/61 (12 Nov 2022 11:41) (125/61 - 125/61)  BP(mean): --  RR: 20 (12 Nov 2022 11:41) (20 - 20)  SpO2: 100% (12 Nov 2022 11:41) (100% - 100%) Denies Lives at home with father, recently quit job
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
## 2022-11-13 DIAGNOSIS — R46.89 OTHER SYMPTOMS AND SIGNS INVOLVING APPEARANCE AND BEHAVIOR: ICD-10-CM

## 2022-11-13 DIAGNOSIS — Z91.14 PATIENT'S OTHER NONCOMPLIANCE WITH MEDICATION REGIMEN: ICD-10-CM

## 2022-11-13 LAB
A1C WITH ESTIMATED AVERAGE GLUCOSE RESULT: 4.6 % — SIGNIFICANT CHANGE UP (ref 4–5.6)
CHOLEST SERPL-MCNC: 234 MG/DL — HIGH
ESTIMATED AVERAGE GLUCOSE: 85 — SIGNIFICANT CHANGE UP
HDLC SERPL-MCNC: 62 MG/DL — SIGNIFICANT CHANGE UP
LIPID PNL WITH DIRECT LDL SERPL: 149 MG/DL — HIGH
NON HDL CHOLESTEROL: 172 MG/DL — HIGH
TRIGL SERPL-MCNC: 116 MG/DL — SIGNIFICANT CHANGE UP

## 2022-11-13 PROCEDURE — 99222 1ST HOSP IP/OBS MODERATE 55: CPT

## 2022-11-13 RX ORDER — NICOTINE POLACRILEX 2 MG
1 GUM BUCCAL DAILY
Refills: 0 | Status: DISCONTINUED | OUTPATIENT
Start: 2022-11-13 | End: 2022-11-29

## 2022-11-13 RX ORDER — NICOTINE POLACRILEX 2 MG
4 GUM BUCCAL
Refills: 0 | Status: DISCONTINUED | OUTPATIENT
Start: 2022-11-13 | End: 2022-12-14

## 2022-11-13 RX ADMIN — Medication 1 PATCH: at 08:46

## 2022-11-13 RX ADMIN — Medication 4 MILLIGRAM(S): at 08:47

## 2022-11-13 RX ADMIN — GABAPENTIN 300 MILLIGRAM(S): 400 CAPSULE ORAL at 20:40

## 2022-11-13 RX ADMIN — QUETIAPINE FUMARATE 50 MILLIGRAM(S): 200 TABLET, FILM COATED ORAL at 20:40

## 2022-11-13 NOTE — BH INPATIENT PSYCHIATRY ASSESSMENT NOTE - NSBHCHARTREVIEWVS_PSY_A_CORE FT
Vital Signs Last 24 Hrs  T(C): 36.7 (11-13-22 @ 06:17), Max: 36.8 (11-12-22 @ 15:18)  T(F): 98 (11-13-22 @ 06:17), Max: 98.3 (11-12-22 @ 15:18)  HR: 106 (11-13-22 @ 06:17) (91 - 115)  BP: 104/64 (11-13-22 @ 06:17) (104/64 - 138/82)  BP(mean): 81 (11-12-22 @ 18:35) (81 - 81)  RR: 16 (11-12-22 @ 18:00) (16 - 20)  SpO2: 100% (11-12-22 @ 18:00) (100% - 100%)     Vital Signs Last 24 Hrs  T(C): 36.7 (11-13-22 @ 06:17), Max: 36.8 (11-12-22 @ 15:18)  T(F): 98 (11-13-22 @ 06:17), Max: 98.3 (11-12-22 @ 15:18)  HR: 106 (11-13-22 @ 06:17) (91 - 115)  BP: 104/64 (11-13-22 @ 06:17) (104/64 - 138/82)  BP(mean): 81 (11-12-22 @ 18:35) (81 - 81)  RR: 16 (11-12-22 @ 18:00) (16 - 18)  SpO2: 100% (11-12-22 @ 18:00) (100% - 100%)

## 2022-11-13 NOTE — BH INPATIENT PSYCHIATRY ASSESSMENT NOTE - NSBHASSESSSUMMFT_PSY_ALL_CORE
26 y/o single woman.  Patient has PPHx of historical diagnoses of Borderline Personality Disorder, schizoaffective disorder, r/o MDD, Bipolar II Disorder, IED. Patient has 2 prior admissions, last hospitalized at [facility] 2016, and [facility] 2018. Patient has hx of drug use (Xanax, cocaine, MJ, opiates, hx of inpatient rehab). Patient is BIB father.  Patient is now hospitalized with a primary problem of worsening depression, agitation, aggression, and emotional dysregulation. Patient admitted to [facility] on a 9.39 legal status. This patient is at elevated acute risk of harm to self and others due to worsening and  escalating behavioral disturbances. She warrants inpatient hospitalization due to recent violent and unpredictable behavior related to acute mental illness, likely to result in harm to self and another person. Patient continues to need inpatient treatment for stabilization and safety.     Plan:  >Legal: 9.39  >Obs: Routine, no current SI. no need for CO, patient not expected to pose risk to self or others in controlled inpatient setting  >Psychiatric Meds: Seroquel 50mg qhs, Gabapentin 300mg qhs.  Observe for tolerability and efficacy. Patient had been poorly adherent prior to admission.   PRN medications:  Ativan 2mg oral Q6HR PRN for agitation and anxiety.  Haldol 5mg oral Q6HR PRN for agitation.   Benadryl 50mg oral Q6HR PRN for agitation.   Vistaril 50mg oral Q6HR PRN for anxiety.  Desyrel 50mg oral QHS PRN for insomnia.   >Labs: Admission labs reviewed, no acute findings. utox +methadone. Labs pending for tomorrow: A1c and Lipid panel. Hold antipsychotics if QTc >500  >Medical:   No acute concerns. No consultations needed at this time. No indication for CIWA. Patient with consistently stable VS, no visible physical symptoms of withdrawal.   During the course of treatment, will collaborate with medical team to manage medical issues.  >Diet: Regular  >Social: milieu/structured therapy  >Treatment Interventions: Groups and Individual Therapy/CBT, Motivational counseling for substance abuse related issues. Consider CASTANEDA  >Dispo: Collateral and dispo planning pending further symptom and medication optimization       26 y/o single woman.  Patient has PPHx of historical diagnoses of Borderline Personality Disorder, schizoaffective disorder, r/o MDD, Bipolar II Disorder, IED. Patient has 2 prior admissions, last hospitalized at [facility] 2016, and [facility] 2018. Patient has hx of drug use (Xanax, cocaine, MJ, opiates, hx of inpatient rehab). Patient is BIB father.  Patient is now hospitalized with a primary problem of worsening depression, agitation, aggression, and emotional dysregulation. Patient admitted to [facility] on a 9.39 legal status. This patient is at elevated acute risk of harm to self and others due to worsening and  escalating behavioral disturbances. She warrants inpatient hospitalization due to recent violent and unpredictable behavior related to acute mental illness, likely to result in harm to self and another person. Patient continues to need inpatient treatment for stabilization and safety.     Plan:  >Legal: 9.39  >Obs: Routine, no current SI. no need for CO, patient not expected to pose risk to self or others in controlled inpatient setting  >Psychiatric Meds: Seroquel 50mg qhs, Gabapentin 300mg qhs, titrate as tolerated.  Observe for tolerability and efficacy. Patient had been poorly adherent prior to admission.   PRN medications:  Ativan 2mg oral Q6HR PRN for agitation and anxiety.  Haldol 5mg oral Q6HR PRN for agitation.   Benadryl 50mg oral Q6HR PRN for agitation.   Vistaril 50mg oral Q6HR PRN for anxiety.  Desyrel 50mg oral QHS PRN for insomnia.   >Labs: Admission labs reviewed, no acute findings. utox +methadone. Labs pending for tomorrow: A1c and Lipid panel. Hold antipsychotics if QTc >500  >Medical:   No acute concerns. No consultations needed at this time. No indication for CIWA. Patient with consistently stable VS, no visible physical symptoms of withdrawal.   During the course of treatment, will collaborate with medical team to manage medical issues.  >Diet: Regular  >Social: milieu/structured therapy  >Treatment Interventions: Groups and Individual Therapy/CBT, Motivational counseling for substance abuse related issues. Consider CASTANEDA  >Dispo: Collateral and dispo planning pending further symptom and medication optimization

## 2022-11-13 NOTE — BH INPATIENT PSYCHIATRY ASSESSMENT NOTE - NSICDXBHSECONDARYDX_PSY_ALL_CORE
Borderline personality disorder   F60.3  Noncompliance with medications   Z91.14  Aggression   R46.89

## 2022-11-13 NOTE — PSYCHIATRIC REHAB INITIAL EVALUATION - NSBHPRRECOMMEND_PSY_ALL_CORE
Writer met with patient to orient her to psychiatric rehabilitation staff and services. Throughout the session, patient was tearful, tense, and guarded with personal history. Patient could not verbalize her reason for admission and became visibly upset when questioned by writer. Patient denied SI, HI, AH, and VH. Writer established a treatment goal for the patient to work on over the next 7 days. Patient expressed interest in vocational resources to support her goals at discharge. Per chart, patient presents with worsening agitation, aggression, and emotional dysregulation in the context of poor treatment compliance. Psychiatric rehabilitation staff will provide encouragement, support, psychotherapy, and psychoeducation to assist the patient in the progression of her treatment goal.
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
## 2022-11-13 NOTE — BH INPATIENT PSYCHIATRY ASSESSMENT NOTE - ATTENDING COMMENTS
Patient seen by me in conference room for initial inpatient interview.  I personally examined the patient and I was directly involved in the management plan and recommendations of the care provided to the patient. I have reviewed the admission record and reviewed the patient’s physical examination, review of systems and there are no pertinent positive findings on the review of systems and the admission labs. I have discussed the case with the NP and I have reviewed the NP's note. I agree with the progress note’s contents and I have made changes if indicated    26 y/o single woman, domiciled with father, no dependents, currently unemployed, PPHx of historical diagnoses of Borderline Personality Disorder, schizoaffective disorder, r/o MDD, Bipolar II Disorder, IED, two prior psychiatric admissions ([facility] 2016, [place] 2018), no suicide attempts, history of self-injury by cutting in past, no legal problems, hx of drug use (Xanax, cocaine, MJ, opiates, hx of inpatient rehab), PMHx denied, BIB father for worsening agitation, aggression, and emotional dysregulation.  In ED patient is dysphoric, tearful, and only able to tolerate limited interview. She was observed covering her ears and yelling, and requires verbal de-escalation multiple times during interaction. When asked what happened prior to arrival in the ED she only replies "I don't know." She says that she "gets angry sometimes" but is unable to elaborate or provide any detail, only repeated "I don't know" when asked further questions. She says she takes Vraylar, Gabapentin, Quetiapine, and Trintellix, for "mental health stuff." When asked to elaborate she says "I don't know, everyone has mental health stuff" and is unable to provide any details or confirm/deny any symptoms. She is able to deny feeling depressed.    On exam today on admission admits to having been noncompliant with her meds when she was doing well and then decompensated  Was then put back on her meds with the addition of trintellex and has been not feeling as well  Patient admitted on a 939 emergency status  Agree with plan to restart Cariprazine, Gabapentin and low dose quetiapine

## 2022-11-13 NOTE — BH INPATIENT PSYCHIATRY ASSESSMENT NOTE - NSBHATTESTAPPBILLTIME_PSY_A_CORE
I attest my time as DEMETRIUS is greater than 50% of the total combined time spent on qualifying patient care activities. I have reviewed and verified the documentation.

## 2022-11-13 NOTE — BH INPATIENT PSYCHIATRY ASSESSMENT NOTE - CURRENT MEDICATION
MEDICATIONS  (STANDING):  gabapentin 300 milliGRAM(s) Oral at bedtime  QUEtiapine 50 milliGRAM(s) Oral at bedtime    MEDICATIONS  (PRN):  haloperidol     Tablet 5 milliGRAM(s) Oral every 6 hours PRN agitation  haloperidol    Injectable 5 milliGRAM(s) IntraMuscular Once PRN agitation  LORazepam     Tablet 2 milliGRAM(s) Oral every 6 hours PRN Agitation  LORazepam   Injectable 2 milliGRAM(s) IntraMuscular Once PRN Agitation  nicotine  Polacrilex Gum 4 milliGRAM(s) Oral every 3 hours PRN nicotine cravings  nicotine -  14 mG/24Hr(s) Patch 1 Patch Transdermal daily PRN nicotine cravings

## 2022-11-13 NOTE — ED BEHAVIORAL HEALTH NOTE - BEHAVIORAL HEALTH NOTE
GISSELL Spangler contacted insurance and spoke with Marce ALEMAN at [phone]. Marce stated the pt is authorized for five days 11/12-11/16. Marce stated that the team will call back either on the last day of coverage or the next morning of uncovered for concurrent review. Authorization# [id number].

## 2022-11-13 NOTE — BH INPATIENT PSYCHIATRY ASSESSMENT NOTE - RISK ASSESSMENT
RF: Agitation, aggression, destruction of property, inability to safety plan, hx of NSSIB, possible AVH, recent medication changes, hx of nonadherence, hx of substance abuse, poor insight regarding need for treatment    PF: Stable residence, sobriety

## 2022-11-13 NOTE — BH INPATIENT PSYCHIATRY ASSESSMENT NOTE - NSBHMETABOLIC_PSY_ALL_CORE_FT
BMI:   HbA1c:   Glucose:   BP: 104/64 (11-13-22 @ 06:17) (104/64 - 138/82)  Lipid Panel:  BMI:   HbA1c: A1C with Estimated Average Glucose Result: 4.6 % (11-13-22 @ 08:50)    Glucose:   BP: 104/64 (11-13-22 @ 06:17) (104/64 - 138/82)  Lipid Panel:

## 2022-11-13 NOTE — PSYCHIATRIC REHAB INITIAL EVALUATION - NSBHEMPDEFICITS2FT_PSY_ALL_CORE
Patient presents with poor treatment compliance and history of aggression and emotional dysregulation.

## 2022-11-14 LAB
A1C WITH ESTIMATED AVERAGE GLUCOSE RESULT: 4.6 % — SIGNIFICANT CHANGE UP (ref 4–5.6)
CHOLEST SERPL-MCNC: 221 MG/DL — HIGH
ESTIMATED AVERAGE GLUCOSE: 85 — SIGNIFICANT CHANGE UP
HDLC SERPL-MCNC: 63 MG/DL — SIGNIFICANT CHANGE UP
LIPID PNL WITH DIRECT LDL SERPL: 135 MG/DL — HIGH
NON HDL CHOLESTEROL: 158 MG/DL — HIGH
TRIGL SERPL-MCNC: 115 MG/DL — SIGNIFICANT CHANGE UP

## 2022-11-14 PROCEDURE — 99233 SBSQ HOSP IP/OBS HIGH 50: CPT

## 2022-11-14 PROCEDURE — 90832 PSYTX W PT 30 MINUTES: CPT | Mod: 59

## 2022-11-14 PROCEDURE — 90853 GROUP PSYCHOTHERAPY: CPT

## 2022-11-14 RX ORDER — ARIPIPRAZOLE 15 MG/1
5 TABLET ORAL AT BEDTIME
Refills: 0 | Status: DISCONTINUED | OUTPATIENT
Start: 2022-11-14 | End: 2022-11-15

## 2022-11-14 RX ORDER — GABAPENTIN 400 MG/1
300 CAPSULE ORAL
Refills: 0 | Status: DISCONTINUED | OUTPATIENT
Start: 2022-11-14 | End: 2022-11-17

## 2022-11-14 RX ORDER — GABAPENTIN 400 MG/1
300 CAPSULE ORAL AT BEDTIME
Refills: 0 | Status: DISCONTINUED | OUTPATIENT
Start: 2022-11-14 | End: 2022-11-28

## 2022-11-14 RX ADMIN — ARIPIPRAZOLE 5 MILLIGRAM(S): 15 TABLET ORAL at 20:59

## 2022-11-14 RX ADMIN — Medication 1 PATCH: at 09:27

## 2022-11-14 RX ADMIN — Medication 4 MILLIGRAM(S): at 11:10

## 2022-11-14 RX ADMIN — QUETIAPINE FUMARATE 50 MILLIGRAM(S): 200 TABLET, FILM COATED ORAL at 20:59

## 2022-11-14 RX ADMIN — Medication 4 MILLIGRAM(S): at 05:23

## 2022-11-14 RX ADMIN — Medication 4 MILLIGRAM(S): at 16:31

## 2022-11-14 NOTE — BH SOCIAL WORK INITIAL PSYCHOSOCIAL EVALUATION - NSBHHOME_PSY_ALL_CORE
HPI:  Gilmar Malloy is a 41-year-old male who presents to the clinic with his wife to establish care and for follow-up of multiple medical problems    He has type 2 diabetes mellitus. Patient is not taking any medications. She is not checking his blood sugar. He is not watching his diet. Patient had chocolate today. He denies polyuria or polydipsia. No weight loss. No fatigue.    Patient has hypertension. He is not taking any medications. He denies chest pain, shortness breath or palpitations. No headaches or dizziness.    His wife reports sleep apnea. Patient snores and stops breathing for a while. He attempted to study before however was terminated secondary to difficulty sleeping and patient declined further management.     Wife reports that they had an argument 2 weeks ago at that time patient felt dizzy and passed out. Patient reports that he felt dizzy and he fell to the floor however he was aware during the whole time with what going on. He denies any chest pain shortness breath or palpitations at this time. No headaches or dizziness. No blurred vision double vision focal motor weaknesses or sensory deficits. No loss of bladder or bowel function. No biting of the tongue. No frothing. His wife denies rolling of the eyes. Patient was acting his normal self in a few seconds.    Patient reports occasional burning sensation just above the right knee of several months duration. Symptoms are intermittent. He denies his symptoms at this time. No tingling or numbness or burning sensation in the feet.    PMHX:  Past Medical History:   Diagnosis Date   • Diabetes mellitus (CMS/[facility])    • Essential (primary) hypertension        MEDICATIONS:   Current Outpatient Prescriptions   Medication Sig Dispense Refill   • metFORMIN (GLUCOPHAGE) 500 MG tablet Take 1 tablet by mouth 2 times daily (with meals). 60 tablet 1   • losartan (COZAAR) 50 MG tablet Take 1 tablet by mouth daily. 30 tablet 1   • Blood Glucose Monitoring Suppl  (ONETOUCH VERIO) w/Device Kit 1 each daily. Dispense OneTouch Verio with Device Kit to use to test blood sugar once daily. Dx E11.9 1 kit 0   • blood glucose (ONETOUCH VERIO) test strip Test blood sugar 1 times daily as directed. Diagnosis: E11.9. Meter: OneTouch 100 each 0   • ONETOUCH DELICA LANCETS 33G Misc Use to test blood sugar once daily. Dx E11.9 OneTouch Glucometer. 100 each 0     No current facility-administered medications for this visit.        ALLERGIES:   ALLERGIES:   Allergen Reactions   • Kiwi HIVES       SOCIAL HISTORY:  Social History     Social History   • Marital status:      Spouse name: N/A   • Number of children: N/A   • Years of education: N/A     Occupational History   • Not on file.     Social History Main Topics   • Smoking status: Current Every Day Smoker     Packs/day: 0.50     Years: 22.00   • Smokeless tobacco: Current User   • Alcohol use Yes      Comment: once a year   • Drug use: Unknown   • Sexual activity: Yes     Partners: Female     Other Topics Concern   • Not on file     Social History Narrative   • No narrative on file     Family History   Problem Relation Age of Onset   • Diabetes Mother    • High blood pressure Mother    • Diabetes Father    • Heart disease Father      stents   • Kidney disease Father    • High blood pressure Father    • Diabetes Sister    • High blood pressure Sister    • Heart disease Maternal Grandfather    • Cancer Paternal Grandmother      melanoma   • Heart disease Paternal Grandfather        PHYSICAL EXAMINATION:   GENERAL: The patient is alert, awake, oriented, in no acute distress.   VITAL SIGNS:  Visit Vitals  BP (!) 146/92   Pulse 97   Temp 97.9 °F (36.6 °C) (Oral)   Resp 16   Ht 5' 6\" (1.676 m)   Wt 106 kg   SpO2 99%   BMI 37.72 kg/m²       HEENT: Pupils equal, round and reactive to light and accommodation. Extraocular muscles intact. Anicteric sclerae. Tympanic membranes clear. Throat clear.   NECK: Supple with full range of motion, no  submandibular, cervical, supraclavicular lymphadenopathy, no thyromegaly, no jugular venous distension or audible bruit.   HEART: Regular rate and rhythm, no S3 or S4, no murmurs.   LUNGS: Clear to auscultation bilaterally. No rhonchi or rales. Adequate air entry. Normal respiratory effort.   ABDOMEN: Soft, nontender, nondistended with audible bowel sounds. No hepatosplenomegaly, no masses.   EXTREMITIES: No edema, clubbing or cyanosis. Dorsalis pedis 2+ bilaterally. Full range of motion of both knees.    Lab Services on 11/03/2017   Component Date Value   • WBC 11/03/2017 10.1    • RBC 11/03/2017 4.70    • HGB 11/03/2017 14.5    • HCT 11/03/2017 42.3    • MCV 11/03/2017 90.0    • MCH 11/03/2017 30.9    • MCHC 11/03/2017 34.3    • RDW-CV 11/03/2017 12.2    • PLT 11/03/2017 238    • DIFF TYPE 11/03/2017 AUTOMATED DIFFERENTIAL    • Neutrophil 11/03/2017 66    • LYMPH 11/03/2017 22    • MONO 11/03/2017 9    • EOSIN 11/03/2017 3    • BASO 11/03/2017 0    • Absolute Neutrophil 11/03/2017 6.6    • Absolute Lymph 11/03/2017 2.2    • Absolute Mono 11/03/2017 0.9    • Absolute Eos 11/03/2017 0.3    • Absolute Baso 11/03/2017 0.0    • Fasting Status 11/03/2017 5 1/2 HOURS    • Sodium 11/03/2017 139    • Potassium 11/03/2017 4.6    • Chloride 11/03/2017 102    • Carbon Dioxide 11/03/2017 31    • Anion Gap 11/03/2017 11    • Glucose 11/03/2017 201*   • BUN 11/03/2017 8    • Creatinine 11/03/2017 1.00    • GFR Estimate,  Am* 11/03/2017 >90    • GFR Estimate, Non Adriana* 11/03/2017 >90    • BUN/Creatinine Ratio 11/03/2017 8    • CALCIUM 11/03/2017 9.8    • TOTAL BILIRUBIN 11/03/2017 0.6    • AST/SGOT 11/03/2017 31    • ALT/SGPT 11/03/2017 48    • ALK PHOSPHATASE 11/03/2017 119*   • TOTAL PROTEIN 11/03/2017 7.9    • Albumin 11/03/2017 4.1    • GLOBULIN 11/03/2017 3.8    • A/G Ratio, Serum 11/03/2017 1.1    Office Visit on 11/03/2017   Component Date Value   • Hemoglobin A1C POC 11/03/2017 8.6*       Assessment and plan    (Z76.89) Encounter to establish care  (primary encounter diagnosis)  Comment: We have discussed importance of regular follow-up.    (I10) Essential (primary) hypertension  Comment: Uncontrolled  Plan: CBC & AUTO DIFFERENTIAL, COMPREHENSIVE         METABOLIC PANEL, BASIC METABOLIC PANEL    Low salt diet  Start losartan 50 mg daily    (E11.9) Type 2 diabetes mellitus without complication, without long-term current use of insulin (CMS/[facility])  Comment: Uncontrolled  Plan: MICROALBUMIN URINE RANDOM, GLYCOHEMOGLOBIN,         LIPID PANEL WITH REFLEX, CANCELED:         GLYCOHEMOGLOBIN     Check blood sugars on a regular basis  Stop metformin 500 mg twice daily  Glucometer strips and lancets sent to the pharmacy    (R55) Syncope, unspecified syncope type  Comment: Patient and wife were advised regarding [place] low unable to drive for 3 months from loss of consciousness. They verbalized understanding and he will not drive.  Plan: ELECTROCARDIOGRAM 12-LEAD, SERVICE TO         CARDIOLOGY    (G47.30) Sleep apnea, unspecified type  Comment: He declines referral at this time for sleep study. He wants to further manage his blood pressure and follow-up with cardiology first.      Follow-up in one month. Sooner if needed.    Declined influenza vaccine. Made aware of risks.   Home with Family

## 2022-11-14 NOTE — BH INPATIENT PSYCHIATRY PROGRESS NOTE - NSBHCHARTREVIEWVS_PSY_A_CORE FT
Vital Signs Last 24 Hrs  T(C): 37 (11-14-22 @ 06:31), Max: 37 (11-14-22 @ 06:31)  T(F): 98.6 (11-14-22 @ 06:31), Max: 98.6 (11-14-22 @ 06:31)  HR: 128 (11-14-22 @ 06:31) (128 - 128)  BP: 119/73 (11-14-22 @ 06:31) (119/73 - 119/73)  BP(mean): --  RR: --  SpO2: --

## 2022-11-14 NOTE — BH INPATIENT PSYCHIATRY PROGRESS NOTE - NSBHFUPINTERVALHXFT_PSY_A_CORE
11/14 RN report received, case reviewed at team, pt seen, MSE done.  No acute events o/n.  ED note duly noted and appreciated.  Pt a poor and limited historian.  Says she has dual dx tx for 3 years, but this was for depression and an eating disorder.  When asked what type of eating disorder, pt says "I don't know."  Frequently answers this.  We reviewed medications and unclear benfits of seroquel+neurontin.  Father reached in pm, lenghty phone session, case and meds reviewed.  Agrees pt is not stable, not functioning and dx to him and also pt remains unclear.  Agrees to stay in tough with tx team and come to family meeting.  Father likes idea of CASTANEDA for safety and compliance once educated on this topic.  No new SEs reported or observed.

## 2022-11-14 NOTE — BH INPATIENT PSYCHIATRY PROGRESS NOTE - OTHER
vague with paucity of ideas and affect both pt narrative Unable to engage fully in interview, gaurded, evasive, presumed paranoid, cannot tolerate interview well

## 2022-11-14 NOTE — BH INPATIENT PSYCHIATRY PROGRESS NOTE - CURRENT MEDICATION
MEDICATIONS  (STANDING):  ARIPiprazole 5 milliGRAM(s) Oral at bedtime  QUEtiapine 50 milliGRAM(s) Oral at bedtime    MEDICATIONS  (PRN):  gabapentin 300 milliGRAM(s) Oral at bedtime PRN mood  gabapentin 300 milliGRAM(s) Oral four times a day PRN anixety  haloperidol     Tablet 5 milliGRAM(s) Oral every 6 hours PRN agitation  haloperidol    Injectable 5 milliGRAM(s) IntraMuscular Once PRN agitation  LORazepam     Tablet 1 milliGRAM(s) Oral every 4 hours PRN Agitation  LORazepam   Injectable 2 milliGRAM(s) IntraMuscular Once PRN Agitation  nicotine  Polacrilex Gum 4 milliGRAM(s) Oral every 3 hours PRN nicotine cravings  nicotine -  14 mG/24Hr(s) Patch 1 Patch Transdermal daily PRN nicotine cravings

## 2022-11-14 NOTE — BH INPATIENT PSYCHIATRY PROGRESS NOTE - PRN MEDS
MEDICATIONS  (PRN):  gabapentin 300 milliGRAM(s) Oral at bedtime PRN mood  gabapentin 300 milliGRAM(s) Oral four times a day PRN anixety  haloperidol     Tablet 5 milliGRAM(s) Oral every 6 hours PRN agitation  haloperidol    Injectable 5 milliGRAM(s) IntraMuscular Once PRN agitation  LORazepam     Tablet 1 milliGRAM(s) Oral every 4 hours PRN Agitation  LORazepam   Injectable 2 milliGRAM(s) IntraMuscular Once PRN Agitation  nicotine  Polacrilex Gum 4 milliGRAM(s) Oral every 3 hours PRN nicotine cravings  nicotine -  14 mG/24Hr(s) Patch 1 Patch Transdermal daily PRN nicotine cravings

## 2022-11-14 NOTE — BH SOCIAL WORK INITIAL PSYCHOSOCIAL EVALUATION - OTHER PAST PSYCHIATRIC HISTORY (INCLUDE DETAILS REGARDING ONSET, COURSE OF ILLNESS, INPATIENT/OUTPATIENT TREATMENT)
26 y/o single woman.  Patient has PPHx of historical diagnoses of Borderline Personality Disorder, schizoaffective disorder, r/o MDD, Bipolar II Disorder, IED. Patient has 2 prior admissions, last hospitalized at [facility] 2016, and [facility] 2018. Patient has hx of drug use (Xanax, cocaine, MJ, opiates, hx of inpatient rehab). Patient is BIB father.  Patient is now hospitalized with a primary problem of worsening depression, agitation, aggression, and emotional dysregulation. Patient admitted to [facility] on a 9.39legal status.

## 2022-11-14 NOTE — BH INPATIENT PSYCHIATRY PROGRESS NOTE - NSBHASSESSSUMMFT_PSY_ALL_CORE
26 y/o single woman.  Patient has PPHx of historical diagnoses of Borderline Personality Disorder, schizoaffective disorder, r/o MDD, Bipolar II Disorder, IED. Patient has 2 prior admissions, last hospitalized at [facility] 2016, and [place] 2018. Patient has hx of drug use (Xanax, cocaine, MJ, opiates, hx of inpatient rehab). Patient is BIB father.  Patient is now hospitalized with a primary problem of worsening depression, agitation, aggression, and emotional dysregulation. Patient admitted to [facility] on a 9.39 legal status. This patient is at elevated acute risk of harm to self and others due to worsening and  escalating behavioral disturbances. She warrants inpatient hospitalization due to recent violent and unpredictable behavior related to acute mental illness, likely to result in harm to self and another person. Patient continues to need inpatient treatment for stabilization and safety.     Plan:  >Legal: 9.39  >Obs: Routine, no current SI. no need for CO, patient not expected to pose risk to self or others in controlled inpatient setting  >Psychiatric Meds: Seroquel 50mg qhs continued but also start abilify 5 mg qhs and uptitrate to 10 mg qhs TUES;  Gabapentin 300mg qhs now as prn and add 300 mg QID PRN for anxiety, titrate as tolerated.  Observe all for tolerability and efficacy. Patient had been poorly adherent prior to admission.   Goal of CASTANEDA for safety and compliance- Aristada vs invega then Sustenna CASTANEDA  PRN medications:  Ativan 2mg oral Q6HR PRN for agitation and anxiety.  Haldol 5mg oral Q6HR PRN for agitation.   Benadryl 50mg oral Q6HR PRN for agitation.   Vistaril 50mg oral Q6HR PRN for anxiety.  Desyrel 50mg oral QHS PRN for insomnia.   >Labs: Admission labs reviewed, no acute findings. utox +methadone. Labs pending for tomorrow: A1c and Lipid panel. Hold antipsychotics if QTc >500  >Medical:   No acute concerns. No consultations needed at this time. No indication for CIWA. Patient with consistently stable VS, no visible physical symptoms of withdrawal.   During the course of treatment, will collaborate with medical team to manage medical issues.  >Diet: Regular  >Social: milieu/structured therapy  >Treatment Interventions: Groups and Individual Therapy/CBT, Motivational counseling for substance abuse related issues. Consider CASTANEDA  >Dispo: Collateral and dispo planning pending further symptom and medication optimization

## 2022-11-15 LAB
-  AMIKACIN: SIGNIFICANT CHANGE UP
-  AMOXICILLIN/CLAVULANIC ACID: SIGNIFICANT CHANGE UP
-  AMPICILLIN/SULBACTAM: SIGNIFICANT CHANGE UP
-  AMPICILLIN: SIGNIFICANT CHANGE UP
-  AZTREONAM: SIGNIFICANT CHANGE UP
-  CEFAZOLIN: SIGNIFICANT CHANGE UP
-  CEFEPIME: SIGNIFICANT CHANGE UP
-  CEFOXITIN: SIGNIFICANT CHANGE UP
-  CEFTRIAXONE: SIGNIFICANT CHANGE UP
-  CIPROFLOXACIN: SIGNIFICANT CHANGE UP
-  ERTAPENEM: SIGNIFICANT CHANGE UP
-  GENTAMICIN: SIGNIFICANT CHANGE UP
-  IMIPENEM: SIGNIFICANT CHANGE UP
-  LEVOFLOXACIN: SIGNIFICANT CHANGE UP
-  MEROPENEM: SIGNIFICANT CHANGE UP
-  NITROFURANTOIN: SIGNIFICANT CHANGE UP
-  PIPERACILLIN/TAZOBACTAM: SIGNIFICANT CHANGE UP
-  TOBRAMYCIN: SIGNIFICANT CHANGE UP
-  TRIMETHOPRIM/SULFAMETHOXAZOLE: SIGNIFICANT CHANGE UP
CULTURE RESULTS: SIGNIFICANT CHANGE UP
METHOD TYPE: SIGNIFICANT CHANGE UP
ORGANISM # SPEC MICROSCOPIC CNT: SIGNIFICANT CHANGE UP
ORGANISM # SPEC MICROSCOPIC CNT: SIGNIFICANT CHANGE UP
SPECIMEN SOURCE: SIGNIFICANT CHANGE UP

## 2022-11-15 PROCEDURE — 90853 GROUP PSYCHOTHERAPY: CPT

## 2022-11-15 RX ORDER — ARIPIPRAZOLE 15 MG/1
10 TABLET ORAL AT BEDTIME
Refills: 0 | Status: DISCONTINUED | OUTPATIENT
Start: 2022-11-15 | End: 2022-11-17

## 2022-11-15 RX ADMIN — Medication 4 MILLIGRAM(S): at 14:48

## 2022-11-15 RX ADMIN — ARIPIPRAZOLE 10 MILLIGRAM(S): 15 TABLET ORAL at 20:40

## 2022-11-15 RX ADMIN — Medication 4 MILLIGRAM(S): at 05:00

## 2022-11-15 RX ADMIN — Medication 4 MILLIGRAM(S): at 08:19

## 2022-11-15 RX ADMIN — Medication 1 PATCH: at 08:20

## 2022-11-15 RX ADMIN — Medication 4 MILLIGRAM(S): at 18:07

## 2022-11-15 RX ADMIN — QUETIAPINE FUMARATE 50 MILLIGRAM(S): 200 TABLET, FILM COATED ORAL at 20:41

## 2022-11-15 NOTE — BH INPATIENT PSYCHIATRY PROGRESS NOTE - NSBHFUPINTERVALHXFT_PSY_A_CORE
11/15 TUES RN report received, case reviewed at team, pt seen, MSE done.  No acute events o/n.  ED note duly noted and appreciated.  Pt a poor and limited historian.  Says she has dual dx tx for 3 years, but this was for depression and an eating disorder.  When asked what type of eating disorder, pt says "I don't know."  Frequently answers this.  We reviewed medications and unclear benefits of seroquel+neurontin.  Father reached in pm MON, lengthy phone session, case and meds reviewed and spoke again today- he will fax records, med hx, etc or bring to unit WED PM.  Agrees pt is not stable, not functioning and dx to him and also pt remains unclear.  Agrees to stay in tough with tx team and come to family meeting.  Father likes idea of CASTANEDA for safety and compliance once educated on this topic.  Pt a bit lethargic today, does not get out of bed for md, partially sleeps through interview but engages a bit.  Says she took her medication.  Explained new abilify trial again, and goal is mood stabilization that also offers an CASTANEDA.  No new SEs reported or observed.

## 2022-11-15 NOTE — BH INPATIENT PSYCHIATRY PROGRESS NOTE - NSBHCHARTREVIEWVS_PSY_A_CORE FT
Vital Signs Last 24 Hrs  T(C): 36.3 (11-16-22 @ 08:35), Max: 36.3 (11-16-22 @ 08:35)  T(F): 97.4 (11-16-22 @ 08:35), Max: 97.4 (11-16-22 @ 08:35)  HR: --  BP: --  BP(mean): --  RR: --  SpO2: --    Orthostatic VS  11-16-22 @ 08:35  Lying BP: --/-- HR: --  Sitting BP: 125/84 HR: 83  Standing BP: --/-- HR: --  Site: --  Mode: --

## 2022-11-15 NOTE — BH INPATIENT PSYCHIATRY PROGRESS NOTE - OTHER
Unable to engage fully in interview, gaurded, evasive, presumed paranoid, cannot tolerate interview well but also prominently apathetic vague with paucity of ideas and affect both pt narrative

## 2022-11-15 NOTE — BH INPATIENT PSYCHIATRY PROGRESS NOTE - NSBHASSESSSUMMFT_PSY_ALL_CORE
28 y/o single woman.  Patient has PPHx of historical diagnoses of Borderline Personality Disorder, schizoaffective disorder, r/o MDD, Bipolar II Disorder, IED. Patient has 2 prior admissions, last hospitalized at [facility] 2016, and [place] 2018. Patient has hx of drug use (Xanax, cocaine, MJ, opiates, hx of inpatient rehab). Patient is BIB father.  Patient is now hospitalized with a primary problem of worsening depression, agitation, aggression, and emotional dysregulation. Patient admitted to [facility] on a 9.39 legal status. This patient is at elevated acute risk of harm to self and others due to worsening and  escalating behavioral disturbances. She warrants inpatient hospitalization due to recent violent and unpredictable behavior related to acute mental illness, likely to result in harm to self and another person. Patient continues to need inpatient treatment for stabilization and safety.     Plan:  >Legal: 9.39  >Obs: Routine, no current SI. no need for CO, patient not expected to pose risk to self or others in controlled inpatient setting  >Psychiatric Meds: Seroquel 50mg qhs continued but also start abilify 5 mg qhs and uptitrate to 10 mg qhs TUES;  Gabapentin 300mg qhs now as prn and add 300 mg QID PRN for anxiety, titrate as tolerated.  Observe all for tolerability and efficacy. Patient had been poorly adherent prior to admission.   Goal of CASTANEDA for safety and compliance- Aristada vs invega then Sustenna CASTANEDA  PRN medications:  Ativan 2mg oral Q6HR PRN for agitation and anxiety.  Haldol 5mg oral Q6HR PRN for agitation.   Benadryl 50mg oral Q6HR PRN for agitation.   Vistaril 50mg oral Q6HR PRN for anxiety.  Desyrel 50mg oral QHS PRN for insomnia.   >Labs: Admission labs reviewed, no acute findings. utox +methadone. Labs pending for tomorrow: A1c and Lipid panel. Hold antipsychotics if QTc >500  >Medical:   No acute concerns. No consultations needed at this time. No indication for CIWA. Patient with consistently stable VS, no visible physical symptoms of withdrawal.   During the course of treatment, will collaborate with medical team to manage medical issues.  >Diet: Regular  >Social: milieu/structured therapy  >Treatment Interventions: Groups and Individual Therapy/CBT, Motivational counseling for substance abuse related issues. Consider CASTANEDA  >Dispo: Collateral and dispo planning pending further symptom and medication optimization

## 2022-11-15 NOTE — BH INPATIENT PSYCHIATRY ASSESSMENT NOTE - NSCGISEVERITYSCORE_CAL_PSY_ALL_CORE
5 Partial Purse String (Simple) Text: Given the location of the defect and the characteristics of the surrounding skin a simple purse string closure was deemed most appropriate.  Undermining was performed circumferentially around the surgical defect.  A purse string suture was then placed and tightened. Wound tension only allowed a partial closure of the circular defect.

## 2022-11-15 NOTE — BH INPATIENT PSYCHIATRY PROGRESS NOTE - CURRENT MEDICATION
MEDICATIONS  (STANDING):  ARIPiprazole 10 milliGRAM(s) Oral at bedtime  QUEtiapine 50 milliGRAM(s) Oral at bedtime    MEDICATIONS  (PRN):  gabapentin 300 milliGRAM(s) Oral at bedtime PRN mood  gabapentin 300 milliGRAM(s) Oral four times a day PRN anixety  haloperidol     Tablet 5 milliGRAM(s) Oral every 6 hours PRN agitation  haloperidol    Injectable 5 milliGRAM(s) IntraMuscular Once PRN agitation  LORazepam     Tablet 1 milliGRAM(s) Oral every 4 hours PRN Agitation  LORazepam   Injectable 2 milliGRAM(s) IntraMuscular Once PRN Agitation  nicotine  Polacrilex Gum 4 milliGRAM(s) Oral every 3 hours PRN nicotine cravings  nicotine -  14 mG/24Hr(s) Patch 1 Patch Transdermal daily PRN nicotine cravings

## 2022-11-16 PROCEDURE — 90853 GROUP PSYCHOTHERAPY: CPT

## 2022-11-16 RX ADMIN — Medication 4 MILLIGRAM(S): at 08:43

## 2022-11-16 RX ADMIN — ARIPIPRAZOLE 10 MILLIGRAM(S): 15 TABLET ORAL at 20:51

## 2022-11-16 RX ADMIN — Medication 4 MILLIGRAM(S): at 03:55

## 2022-11-16 RX ADMIN — QUETIAPINE FUMARATE 50 MILLIGRAM(S): 200 TABLET, FILM COATED ORAL at 20:51

## 2022-11-16 RX ADMIN — Medication 1 MILLIGRAM(S): at 09:01

## 2022-11-16 RX ADMIN — Medication 4 MILLIGRAM(S): at 16:00

## 2022-11-16 RX ADMIN — GABAPENTIN 300 MILLIGRAM(S): 400 CAPSULE ORAL at 20:50

## 2022-11-16 RX ADMIN — Medication 1 PATCH: at 09:23

## 2022-11-16 RX ADMIN — HALOPERIDOL DECANOATE 5 MILLIGRAM(S): 100 INJECTION INTRAMUSCULAR at 09:01

## 2022-11-16 NOTE — BH INPATIENT PSYCHIATRY PROGRESS NOTE - CURRENT MEDICATION
MEDICATIONS  (STANDING):  ARIPiprazole 10 milliGRAM(s) Oral at bedtime  QUEtiapine 50 milliGRAM(s) Oral at bedtime    MEDICATIONS  (PRN):  gabapentin 300 milliGRAM(s) Oral at bedtime PRN mood  gabapentin 300 milliGRAM(s) Oral four times a day PRN anixety  haloperidol     Tablet 5 milliGRAM(s) Oral every 6 hours PRN agitation  haloperidol    Injectable 5 milliGRAM(s) IntraMuscular Once PRN agitation  LORazepam     Tablet 0.5 milliGRAM(s) Oral every 4 hours PRN Agitation  LORazepam   Injectable 2 milliGRAM(s) IntraMuscular Once PRN Agitation  nicotine  Polacrilex Gum 4 milliGRAM(s) Oral every 3 hours PRN nicotine cravings  nicotine -  14 mG/24Hr(s) Patch 1 Patch Transdermal daily PRN nicotine cravings

## 2022-11-16 NOTE — BH INPATIENT PSYCHIATRY PROGRESS NOTE - NSBHCHARTREVIEWVS_PSY_A_CORE FT
Vital Signs Last 24 Hrs  T(C): 37.1 (11-17-22 @ 07:49), Max: 37.1 (11-17-22 @ 07:49)  T(F): 98.7 (11-17-22 @ 07:49), Max: 98.7 (11-17-22 @ 07:49)  HR: --  BP: --  BP(mean): --  RR: --  SpO2: --    Orthostatic VS  11-17-22 @ 07:49  Lying BP: --/-- HR: --  Sitting BP: 103/63 HR: 78  Standing BP: --/-- HR: --  Site: --  Mode: --  Orthostatic VS  11-16-22 @ 08:35  Lying BP: --/-- HR: --  Sitting BP: 125/84 HR: 83  Standing BP: --/-- HR: --  Site: --  Mode: --

## 2022-11-16 NOTE — BH INPATIENT PSYCHIATRY PROGRESS NOTE - PRN MEDS
MEDICATIONS  (PRN):  gabapentin 300 milliGRAM(s) Oral at bedtime PRN mood  gabapentin 300 milliGRAM(s) Oral four times a day PRN anixety  haloperidol     Tablet 5 milliGRAM(s) Oral every 6 hours PRN agitation  haloperidol    Injectable 5 milliGRAM(s) IntraMuscular Once PRN agitation  LORazepam     Tablet 0.5 milliGRAM(s) Oral every 4 hours PRN Agitation  LORazepam   Injectable 2 milliGRAM(s) IntraMuscular Once PRN Agitation  nicotine  Polacrilex Gum 4 milliGRAM(s) Oral every 3 hours PRN nicotine cravings  nicotine -  14 mG/24Hr(s) Patch 1 Patch Transdermal daily PRN nicotine cravings

## 2022-11-16 NOTE — BH INPATIENT PSYCHIATRY PROGRESS NOTE - NSBHFUPINTERVALHXFT_PSY_A_CORE
11/16 WED RN report received, case reviewed at team, pt seen, MSE done.  No acute events o/n.  ED note duly noted and appreciated.  Pt a poor and limited historian and writer begins to suspect IQ deficits.  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  Says she has dual dx tx for 3 years, but this was for depression and an eating disorder.  When asked what type of eating disorder, pt says "I don't know."  Frequently answers this to questions, confirrmed by father.  We reviewed medications and unclear benefits of seroquel+neurontin+vraylar+trintellix.  Father reached in pm MON, lengthy phone session, case and meds reviewed and spoke again today- he will fax records, med hx, etc or bring to unit WED PM.  Agrees pt is not stable, not functioning and dx to him and also pt remains unclear.  Agrees to stay in tough with tx team and come to family meeting.  Father likes idea of CASTANEDA for safety and compliance once educated on this topic.  Pt less lethargic today, engages MD a bit.  Says she was angry, wanted to apologize to father about this but when asked why she was angry, replied again that she did not know.  Says she took her medication, unable to report benefits yet.  Explained new abilify trial again, and goal is mood stabilization that also offers an CASTANEDA but have not discussed CASTANEDA option yet.  No new SEs reported or observed.

## 2022-11-16 NOTE — BH INPATIENT PSYCHIATRY PROGRESS NOTE - OTHER
but also prominently apathetic vs avoidant, answers questions to which she clearly knows answers as "I don't know." [also noted by father] vague with paucity of ideas and affect both, markedly avoidant, guarded Unable to engage fully in interview, gaurded, evasive, presumed paranoid, cannot tolerate interview well pt narrative

## 2022-11-16 NOTE — BH INPATIENT PSYCHIATRY PROGRESS NOTE - NSBHASSESSSUMMFT_PSY_ALL_CORE
26 y/o single woman.  Patient has PPHx of historical diagnoses of Borderline Personality Disorder, schizoaffective disorder, r/o MDD, Bipolar II Disorder, IED. Patient has 2 prior admissions, last hospitalized at [facility] 2016, and [place] 2018. Patient has hx of drug use (Xanax, cocaine, MJ, opiates, hx of inpatient rehab). Patient is BIB father.  Patient is now hospitalized with a primary problem of worsening depression, agitation, aggression, and emotional dysregulation. Patient admitted to [facility] on a 9.39 legal status. This patient is at elevated acute risk of harm to self and others due to worsening and  escalating behavioral disturbances. She warrants inpatient hospitalization due to recent violent and unpredictable behavior related to acute mental illness, likely to result in harm to self and another person. Patient continues to need inpatient treatment for stabilization and safety.     Plan:  >Legal: 9.39  >Obs: Routine, no current SI. no need for CO, patient not expected to pose risk to self or others in controlled inpatient setting  >Psychiatric Meds: Seroquel 50mg qhs continued but also start abilify 5 mg qhs and uptitrate to 10 mg qhs TUES;  Gabapentin 300mg qhs now as prn and add 300 mg QID PRN for anxiety, titrate as tolerated.  Observe all for tolerability and efficacy. Patient had been poorly adherent prior to admission.   Goal of CASTANEDA for safety and compliance- Aristada vs invega then Sustenna CASTANEDA  PRN medications:  Ativan 2mg oral Q6HR PRN for agitation and anxiety.  Haldol 5mg oral Q6HR PRN for agitation.   Benadryl 50mg oral Q6HR PRN for agitation.   Vistaril 50mg oral Q6HR PRN for anxiety.  Desyrel 50mg oral QHS PRN for insomnia.   >Labs: Admission labs reviewed, no acute findings. utox +methadone. Labs pending for tomorrow: A1c and Lipid panel. Hold antipsychotics if QTc >500  >Medical:   No acute concerns. No consultations needed at this time. No indication for CIWA. Patient with consistently stable VS, no visible physical symptoms of withdrawal.   During the course of treatment, will collaborate with medical team to manage medical issues.  >Diet: Regular  >Social: milieu/structured therapy  >Treatment Interventions: Groups and Individual Therapy/CBT, Motivational counseling for substance abuse related issues. Consider CASTANEDA  >Dispo: Collateral and dispo planning pending further symptom and medication optimization

## 2022-11-17 PROCEDURE — 90853 GROUP PSYCHOTHERAPY: CPT

## 2022-11-17 RX ORDER — GABAPENTIN 400 MG/1
400 CAPSULE ORAL
Refills: 0 | Status: DISCONTINUED | OUTPATIENT
Start: 2022-11-17 | End: 2022-11-29

## 2022-11-17 RX ORDER — ARIPIPRAZOLE 15 MG/1
15 TABLET ORAL AT BEDTIME
Refills: 0 | Status: DISCONTINUED | OUTPATIENT
Start: 2022-11-17 | End: 2022-11-18

## 2022-11-17 RX ADMIN — GABAPENTIN 300 MILLIGRAM(S): 400 CAPSULE ORAL at 11:03

## 2022-11-17 RX ADMIN — ARIPIPRAZOLE 15 MILLIGRAM(S): 15 TABLET ORAL at 20:47

## 2022-11-17 RX ADMIN — Medication 1 PATCH: at 07:46

## 2022-11-17 RX ADMIN — QUETIAPINE FUMARATE 50 MILLIGRAM(S): 200 TABLET, FILM COATED ORAL at 20:47

## 2022-11-17 RX ADMIN — Medication 4 MILLIGRAM(S): at 07:46

## 2022-11-17 RX ADMIN — Medication 4 MILLIGRAM(S): at 14:41

## 2022-11-17 RX ADMIN — GABAPENTIN 300 MILLIGRAM(S): 400 CAPSULE ORAL at 15:12

## 2022-11-17 RX ADMIN — Medication 0.5 MILLIGRAM(S): at 16:48

## 2022-11-17 NOTE — BH INPATIENT PSYCHIATRY PROGRESS NOTE - NSBHFUPINTERVALHXFT_PSY_A_CORE
11/17 THURS RN report received, case reviewed at team, pt seen, MSE done.  No acute events o/n.  ED note duly noted and appreciated.  Pt a poor and limited historian and writer and team begins to suspect IQ deficits.  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  Says she has dual dx tx for 3 years, but this was for depression and an eating disorder.  When asked what type of eating disorder, pt says "I don't know."  Frequently answers this to questions, confirrmed by father.  We reviewed medications and unclear benefits of seroquel+neurontin+vraylar+trintellix.  Father reached in pm MON, lengthy phone session, case and meds reviewed and spoke again today- he will fax records, med hx, etc or bring to unit WED PM.  Agrees pt is not stable, not functioning and dx to him and also pt remains unclear.  Agrees to stay in tough with tx team and come to family meeting.  Father likes idea of CASTANEDA for safety and compliance once educated on this topic.  Pt less lethargic today, engages MD a bit.  Says she was angry, wanted to apologize to father about this but when asked why she was angry, replied again that she did not know.  Says she took her medication, unable to report benefits yet and we will continue to monitor.  Appears calmer but also isolative and disengaged from tx.  Explained new abilify trial again, and goal is mood stabilization that also offers an CASTANEDA but have not discussed CASTANEDA option yet.  Reports eating and sleeping well, attending some groups. Encouraged more attention to coping skills.  No new SEs reported or observed.

## 2022-11-17 NOTE — BH INPATIENT PSYCHIATRY PROGRESS NOTE - PRN MEDS
MEDICATIONS  (PRN):  gabapentin 400 milliGRAM(s) Oral four times a day PRN anixety  gabapentin 300 milliGRAM(s) Oral at bedtime PRN mood  haloperidol     Tablet 5 milliGRAM(s) Oral every 6 hours PRN agitation  haloperidol    Injectable 5 milliGRAM(s) IntraMuscular Once PRN agitation  LORazepam   Injectable 2 milliGRAM(s) IntraMuscular Once PRN Agitation  nicotine  Polacrilex Gum 4 milliGRAM(s) Oral every 3 hours PRN nicotine cravings  nicotine -  14 mG/24Hr(s) Patch 1 Patch Transdermal daily PRN nicotine cravings

## 2022-11-17 NOTE — BH INPATIENT PSYCHIATRY PROGRESS NOTE - NSBHASSESSSUMMFT_PSY_ALL_CORE
28 y/o single woman.  Patient has PPHx of historical diagnoses of Borderline Personality Disorder, schizoaffective disorder, r/o MDD, Bipolar II Disorder, IED. Patient has 2 prior admissions, last hospitalized at [facility] 2016, and [place] 2018. Patient has hx of drug use (Xanax, cocaine, MJ, opiates, hx of inpatient rehab). Patient is BIB father.  Patient is now hospitalized with a primary problem of worsening depression, agitation, aggression, and emotional dysregulation. Patient admitted to [facility] on a 9.39 legal status. This patient is at elevated acute risk of harm to self and others due to worsening and  escalating behavioral disturbances. She warrants inpatient hospitalization due to recent violent and unpredictable behavior related to acute mental illness, likely to result in harm to self and another person. Patient continues to need inpatient treatment for stabilization and safety.     Plan:  >Legal: 9.39  >Obs: Routine, no current SI. no need for CO, patient not expected to pose risk to self or others in controlled inpatient setting  >Psychiatric Meds: Seroquel 50mg qhs continued;  >>>but also started abilify 5 mg qhs and uptitrated to 10 mg qhs TUES and 15 mg qhs THURS and then 20 mg qhs and monitor- possibly needs 20-30 mg qhs;  Gabapentin 300mg qhs now as prn and add 300 mg QID PRN for anxiety, titrate as tolerated.  Observe all for tolerability and efficacy. Patient had been poorly adherent prior to admission.   Goal of CASTANEDA for safety and compliance- Aristada vs invega then Sustenna CASTANEDA  PRN medications:  Ativan 2mg oral Q6HR PRN for agitation and anxiety.  Haldol 5mg oral Q6HR PRN for agitation.   Benadryl 50mg oral Q6HR PRN for agitation.   Vistaril 50mg oral Q6HR PRN for anxiety.  Desyrel 50mg oral QHS PRN for insomnia.   >Labs: Admission labs reviewed, no acute findings. utox +methadone. Labs pending for tomorrow: A1c and Lipid panel. Hold antipsychotics if QTc >500  >Medical:   No acute concerns. No consultations needed at this time. No indication for CIWA. Patient with consistently stable VS, no visible physical symptoms of withdrawal.   During the course of treatment, will collaborate with medical team to manage medical issues.  >Diet: Regular  >Social: milieu/structured therapy  >Treatment Interventions: Groups and Individual Therapy/CBT, Motivational counseling for substance abuse related issues. Consider CASTANEDA  >Dispo: Collateral and dispo planning pending further symptom and medication optimization

## 2022-11-17 NOTE — BH INPATIENT PSYCHIATRY PROGRESS NOTE - OTHER
but also prominently apathetic vs avoidant, answers questions to which she clearly knows answers as "I don't know." [also noted by father]; also prominently irritable at times, walks off from MD less often pt narrative vague with paucity of ideas and affect both, markedly avoidant, guarded Unable to engage fully in interview, gaurded, evasive, presumed paranoid, cannot tolerate interview well

## 2022-11-17 NOTE — BH INPATIENT PSYCHIATRY PROGRESS NOTE - NSBHCHARTREVIEWVS_PSY_A_CORE FT
Vital Signs Last 24 Hrs  T(C): 36.7 (11-21-22 @ 06:48), Max: 36.7 (11-21-22 @ 06:48)  T(F): 98 (11-21-22 @ 06:48), Max: 98 (11-21-22 @ 06:48)  HR: 97 (11-21-22 @ 06:48) (97 - 97)  BP: 113/82 (11-21-22 @ 06:48) (113/82 - 113/82)  BP(mean): --  RR: --  SpO2: --    Orthostatic VS  11-20-22 @ 06:28  Lying BP: --/-- HR: --  Sitting BP: 108/78 HR: 101  Standing BP: --/-- HR: --  Site: --  Mode: --

## 2022-11-18 PROCEDURE — 99232 SBSQ HOSP IP/OBS MODERATE 35: CPT

## 2022-11-18 PROCEDURE — 90853 GROUP PSYCHOTHERAPY: CPT

## 2022-11-18 PROCEDURE — 90832 PSYTX W PT 30 MINUTES: CPT | Mod: 59

## 2022-11-18 RX ORDER — ARIPIPRAZOLE 15 MG/1
20 TABLET ORAL AT BEDTIME
Refills: 0 | Status: DISCONTINUED | OUTPATIENT
Start: 2022-11-18 | End: 2022-11-22

## 2022-11-18 RX ADMIN — ARIPIPRAZOLE 20 MILLIGRAM(S): 15 TABLET ORAL at 21:05

## 2022-11-18 RX ADMIN — Medication 4 MILLIGRAM(S): at 19:35

## 2022-11-18 RX ADMIN — QUETIAPINE FUMARATE 50 MILLIGRAM(S): 200 TABLET, FILM COATED ORAL at 21:05

## 2022-11-18 RX ADMIN — Medication 0.5 MILLIGRAM(S): at 21:25

## 2022-11-18 NOTE — BH INPATIENT PSYCHIATRY PROGRESS NOTE - NSBHFUPINTERVALHXFT_PSY_A_CORE
f/up SAD, care discussed w/ treatment team, LOUISE. Spoke to patient in her room with GISSELL Mary, Patient reported feeling "anxious", then changed her statement that she does trust staff, wants to "hang" out with them. Patient then reported feeling overwhelmed. When writer asked why she was admitted, patient stated that she was brought in by her father and when asked to elaborate, patient stood up, made her bed and ended the interview. Patient was oddly related, paranoid, guarded. Patient was in group this AM, without any issues, but did ask psychologist to clarify concepts. Appearing frustrated towards the end. Patient denied SE to meds. As per team, patient is concrete in her tp and not forthcoming about events pertaining to admission. Patient with underlying irritability/ agitation. Tolerating Abilify, denied SE to meds. no td, eps or tremors observed/ reported. no akathisia.

## 2022-11-18 NOTE — BH INPATIENT PSYCHIATRY PROGRESS NOTE - NSBHCHARTREVIEWVS_PSY_A_CORE FT
Vital Signs Last 24 Hrs  T(C): 35.6 (11-18-22 @ 07:20), Max: 35.6 (11-18-22 @ 07:20)  T(F): 96 (11-18-22 @ 07:20), Max: 96 (11-18-22 @ 07:20)  HR: 93 (11-18-22 @ 07:20) (93 - 93)  BP: 121/90 (11-18-22 @ 07:20) (121/90 - 121/90)  BP(mean): --  RR: --  SpO2: --    Orthostatic VS  11-17-22 @ 07:49  Lying BP: --/-- HR: --  Sitting BP: 103/63 HR: 78  Standing BP: --/-- HR: --  Site: --  Mode: --

## 2022-11-18 NOTE — BH INPATIENT PSYCHIATRY PROGRESS NOTE - NSBHASSESSSUMMFT_PSY_ALL_CORE
26 y/o single woman.  Patient has PPHx of historical diagnoses of Borderline Personality Disorder, schizoaffective disorder, r/o MDD, Bipolar II Disorder, IED. Patient has 2 prior admissions, last hospitalized at [facility] 2016, and [place] 2018. Patient has hx of drug use (Xanax, cocaine, MJ, opiates, hx of inpatient rehab). Patient is BIB father.  Patient is now hospitalized with a primary problem of worsening depression, agitation, aggression, and emotional dysregulation. Patient admitted to [facility] on a 9.39 legal status. This patient is at elevated acute risk of harm to self and others due to worsening and  escalating behavioral disturbances. She warrants inpatient hospitalization due to recent violent and unpredictable behavior related to acute mental illness, likely to result in harm to self and another person. Patient continues to need inpatient treatment for stabilization and safety.    on assessment, patient is anxious, guarded, oddly related, with underlying irritability, paranoid, with concrete tp. Observed to have poor frustration tolerance, having difficulty understanding group concepts.      Plan:  >Legal: 9.39  >Obs: Routine, no current SI. no need for CO, patient not expected to pose risk to self or others in controlled inpatient setting  >Psychiatric Meds: Seroquel 50mg qhs continued but also start abilify 5 mg qhs and uptitrate to 10 mg qhs TUES; 11/18: increase Abilify to 20mg hs  Gabapentin 300mg qhs now as prn and add 300 mg QID PRN for anxiety, titrate as tolerated.  Observe all for tolerability and efficacy. Patient had been poorly adherent prior to admission.   Goal of CASTANEDA for safety and compliance- Aristada vs invega then Sustenna CASTANEDA  PRN medications:  Ativan 2mg oral Q6HR PRN for agitation and anxiety.  Haldol 5mg oral Q6HR PRN for agitation.   Benadryl 50mg oral Q6HR PRN for agitation.   Vistaril 50mg oral Q6HR PRN for anxiety.  Desyrel 50mg oral QHS PRN for insomnia.   >Labs: Admission labs reviewed, no acute findings. utox +methadone. Labs pending for tomorrow: A1c and Lipid panel. Hold antipsychotics if QTc >500  >Medical:   No acute concerns. No consultations needed at this time. No indication for CIWA. Patient with consistently stable VS, no visible physical symptoms of withdrawal.   During the course of treatment, will collaborate with medical team to manage medical issues.  >Diet: Regular  >Social: milieu/structured therapy  >Treatment Interventions: Groups and Individual Therapy/CBT, Motivational counseling for substance abuse related issues. Consider CASTANEDA  >Dispo: Collateral and dispo planning pending further symptom and medication optimization

## 2022-11-18 NOTE — BH INPATIENT PSYCHIATRY PROGRESS NOTE - OTHER
pt narrative Unable to engage fully in interview, gaurded, evasive, presumed paranoid, cannot tolerate interview well partially cooperative vague with paucity of ideas and affect both, markedly avoidant, guarded

## 2022-11-18 NOTE — BH INPATIENT PSYCHIATRY PROGRESS NOTE - CURRENT MEDICATION
MEDICATIONS  (STANDING):  ARIPiprazole 20 milliGRAM(s) Oral at bedtime  QUEtiapine 50 milliGRAM(s) Oral at bedtime    MEDICATIONS  (PRN):  gabapentin 400 milliGRAM(s) Oral four times a day PRN anixety  gabapentin 300 milliGRAM(s) Oral at bedtime PRN mood  haloperidol     Tablet 5 milliGRAM(s) Oral every 6 hours PRN agitation  haloperidol    Injectable 5 milliGRAM(s) IntraMuscular Once PRN agitation  LORazepam     Tablet 0.5 milliGRAM(s) Oral every 4 hours PRN Agitation  LORazepam   Injectable 2 milliGRAM(s) IntraMuscular Once PRN Agitation  nicotine  Polacrilex Gum 4 milliGRAM(s) Oral every 3 hours PRN nicotine cravings  nicotine -  14 mG/24Hr(s) Patch 1 Patch Transdermal daily PRN nicotine cravings

## 2022-11-18 NOTE — BH PSYCHOLOGY - CLINICIAN PSYCHOTHERAPY NOTE - TOKEN PULL-DIAGNOSIS
Primary Diagnosis:  Schizoaffective disorder [F25.9]        Problem Dx:   Aggression [R46.89]      Noncompliance with medications [Z91.14]      Borderline personality disorder [F60.3]      
Primary Diagnosis:  Schizoaffective disorder [F25.9]        Problem Dx:   Aggression [R46.89]      Noncompliance with medications [Z91.14]      Borderline personality disorder [F60.3]

## 2022-11-18 NOTE — BH INPATIENT PSYCHIATRY PROGRESS NOTE - PRN MEDS
MEDICATIONS  (PRN):  gabapentin 400 milliGRAM(s) Oral four times a day PRN anixety  gabapentin 300 milliGRAM(s) Oral at bedtime PRN mood  haloperidol     Tablet 5 milliGRAM(s) Oral every 6 hours PRN agitation  haloperidol    Injectable 5 milliGRAM(s) IntraMuscular Once PRN agitation  LORazepam     Tablet 0.5 milliGRAM(s) Oral every 4 hours PRN Agitation  LORazepam   Injectable 2 milliGRAM(s) IntraMuscular Once PRN Agitation  nicotine  Polacrilex Gum 4 milliGRAM(s) Oral every 3 hours PRN nicotine cravings  nicotine -  14 mG/24Hr(s) Patch 1 Patch Transdermal daily PRN nicotine cravings

## 2022-11-18 NOTE — BH PSYCHOLOGY - CLINICIAN PSYCHOTHERAPY NOTE - NSBHPSYCHOLNARRATIVE_PSY_A_CORE FT
Engaged in psychology session. Writer approached pt after pt became upset in group in the setting of struggling to understand a concept. Pt initially declined to speak in room, opting to remain silent. She didn't respond to questions from writer (current mood, what's on her mind) and as such, writer engaged pt in grounding exercise. Pt participated in exercise to help her feel more present and focused in the moment. We repeated the grounding exercise and in the midst of the exercise, pt started to verbalize her thoughts. She apologized to writer for her behavior in group session and reported having "negative thoughts" about writer and staff on unit. Normalized experience of having thoughts that one may not agree with. Pt appeared somewhat disconnected from writer and went on to say "now I'm having a good thought" without being able to engage in back and forth conversation about her experience. At the end of session, pt paused between her words and stated "I thought the breathing was helping me turn right...helped me turn my head...it helped me with my therapist." Writer asked pt to clarify what she meant and she was unable to engage with writer. Reported feeling better at end of session and writer encouraged pt to discuss with team her current issues and struggles. Offered validation, supportive listening and skill building.     Patient participated in psychotherapy session. Patient presented with adequate grooming and was casually dressed. Patient maintained appropriate eye contact and demonstrated a superficially cooperative attitude. No abnormal motor movements noted. Patient's mood was "good" with flat affect. Speech was within normal limits. No perceptual disturbances noted or observed. Patient's thought process was concrete and impulsive (made out of turn comments). Patient's thought content was significant for odd connections between events. Denied suicidal ideation/intent/plan. No HI endorsed. Insight and judgement are limited. 
Engaged in initial psychology session. Pt presented as distracted, anxious, and vigilant. She declined to share details on her personal history; writer offered much validation to support pt in feeling heard. She stated there was no reason to discuss events leading to this admission and she noted that she is aware of her reason for admission. She asked writer what to do to comply with treatment - explained importance of building rapport with team, attending groups and individual meetings with clinicians. As pt started to speak about her wish to leave for Thanksgiving, she began to sob and stated "I just want things to be normal again!" We went outside to help pt regulate her emotional state. She asked for a book to read and showed writer her coloring pages. When asked what she would like to work on during this admission, she reported "irritability and anger." Writer provided pt a book and she abruptly left the room.    Patient participated in psychotherapy session. Patient presented with adequate grooming and was casually dressed. Patient maintained appropriate eye contact and demonstrated a guarded and superficial attitude. No abnormal motor movements noted. Patient's mood was anxious with labile affect. Speech was within normal limits. No perceptual disturbances noted or observed. Patient's thought process was concrete. Patient's thought content was significant for wish for discharge and topical explanation of her circumstances. Suicidal ideation/intent/plan DENY because pt left room before end of interview. No HI endorsed. Insight and judgement are fair.

## 2022-11-19 RX ADMIN — QUETIAPINE FUMARATE 50 MILLIGRAM(S): 200 TABLET, FILM COATED ORAL at 20:25

## 2022-11-19 RX ADMIN — HALOPERIDOL DECANOATE 5 MILLIGRAM(S): 100 INJECTION INTRAMUSCULAR at 10:52

## 2022-11-19 RX ADMIN — Medication 1 PATCH: at 09:00

## 2022-11-19 RX ADMIN — Medication 0.5 MILLIGRAM(S): at 08:59

## 2022-11-19 RX ADMIN — ARIPIPRAZOLE 20 MILLIGRAM(S): 15 TABLET ORAL at 20:24

## 2022-11-19 RX ADMIN — Medication 4 MILLIGRAM(S): at 15:41

## 2022-11-19 RX ADMIN — GABAPENTIN 400 MILLIGRAM(S): 400 CAPSULE ORAL at 20:54

## 2022-11-19 RX ADMIN — Medication 4 MILLIGRAM(S): at 13:09

## 2022-11-19 RX ADMIN — Medication 4 MILLIGRAM(S): at 20:26

## 2022-11-19 RX ADMIN — GABAPENTIN 400 MILLIGRAM(S): 400 CAPSULE ORAL at 10:52

## 2022-11-19 RX ADMIN — Medication 4 MILLIGRAM(S): at 09:00

## 2022-11-20 RX ADMIN — Medication 4 MILLIGRAM(S): at 15:21

## 2022-11-20 RX ADMIN — ARIPIPRAZOLE 20 MILLIGRAM(S): 15 TABLET ORAL at 20:28

## 2022-11-20 RX ADMIN — Medication 4 MILLIGRAM(S): at 05:45

## 2022-11-20 RX ADMIN — Medication 4 MILLIGRAM(S): at 09:01

## 2022-11-20 RX ADMIN — Medication 4 MILLIGRAM(S): at 23:47

## 2022-11-20 RX ADMIN — Medication 4 MILLIGRAM(S): at 13:21

## 2022-11-20 RX ADMIN — QUETIAPINE FUMARATE 50 MILLIGRAM(S): 200 TABLET, FILM COATED ORAL at 20:28

## 2022-11-20 RX ADMIN — GABAPENTIN 400 MILLIGRAM(S): 400 CAPSULE ORAL at 14:33

## 2022-11-20 RX ADMIN — GABAPENTIN 400 MILLIGRAM(S): 400 CAPSULE ORAL at 08:25

## 2022-11-20 RX ADMIN — Medication 1 PATCH: at 09:01

## 2022-11-20 RX ADMIN — GABAPENTIN 400 MILLIGRAM(S): 400 CAPSULE ORAL at 21:03

## 2022-11-20 RX ADMIN — HALOPERIDOL DECANOATE 5 MILLIGRAM(S): 100 INJECTION INTRAMUSCULAR at 23:09

## 2022-11-20 RX ADMIN — GABAPENTIN 400 MILLIGRAM(S): 400 CAPSULE ORAL at 05:47

## 2022-11-20 RX ADMIN — Medication 4 MILLIGRAM(S): at 19:20

## 2022-11-21 RX ORDER — ARIPIPRAZOLE 15 MG/1
1 TABLET ORAL
Qty: 30 | Refills: 0
Start: 2022-11-21 | End: 2022-12-20

## 2022-11-21 RX ADMIN — Medication 4 MILLIGRAM(S): at 17:26

## 2022-11-21 RX ADMIN — GABAPENTIN 400 MILLIGRAM(S): 400 CAPSULE ORAL at 08:59

## 2022-11-21 RX ADMIN — Medication 4 MILLIGRAM(S): at 08:18

## 2022-11-21 RX ADMIN — Medication 1 PATCH: at 08:19

## 2022-11-21 RX ADMIN — ARIPIPRAZOLE 20 MILLIGRAM(S): 15 TABLET ORAL at 20:46

## 2022-11-21 RX ADMIN — QUETIAPINE FUMARATE 50 MILLIGRAM(S): 200 TABLET, FILM COATED ORAL at 20:47

## 2022-11-21 RX ADMIN — Medication 1 PATCH: at 07:52

## 2022-11-21 RX ADMIN — Medication 1 PATCH: at 10:09

## 2022-11-21 RX ADMIN — GABAPENTIN 400 MILLIGRAM(S): 400 CAPSULE ORAL at 16:12

## 2022-11-21 NOTE — BH INPATIENT PSYCHIATRY PROGRESS NOTE - NSBHCHARTREVIEWVS_PSY_A_CORE FT
Vital Signs Last 24 Hrs  T(C): 37.2 (11-21-22 @ 18:39), Max: 37.2 (11-21-22 @ 18:39)  T(F): 99 (11-21-22 @ 18:39), Max: 99 (11-21-22 @ 18:39)  HR: 97 (11-21-22 @ 06:48) (97 - 97)  BP: 113/82 (11-21-22 @ 06:48) (113/82 - 113/82)  BP(mean): --  RR: --  SpO2: --    Orthostatic VS  11-20-22 @ 06:28  Lying BP: --/-- HR: --  Sitting BP: 108/78 HR: 101  Standing BP: --/-- HR: --  Site: --  Mode: --
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
## 2022-11-21 NOTE — BH INPATIENT PSYCHIATRY PROGRESS NOTE - NSBHASSESSSUMMFT_PSY_ALL_CORE
28 y/o single woman.  Patient has PPHx of historical diagnoses of Borderline Personality Disorder, schizoaffective disorder, r/o MDD, Bipolar II Disorder, IED. Patient has 2 prior admissions, last hospitalized at [facility] 2016, and [place] 2018. Patient has hx of drug use (Xanax, cocaine, MJ, opiates, hx of inpatient rehab). Patient is BIB father.  Patient is now hospitalized with a primary problem of worsening depression, agitation, aggression, and emotional dysregulation. Patient admitted to [facility] on a 9.39 legal status. This patient is at elevated acute risk of harm to self and others due to worsening and  escalating behavioral disturbances. She warrants inpatient hospitalization due to recent violent and unpredictable behavior related to acute mental illness, likely to result in harm to self and another person. Patient continues to need inpatient treatment for stabilization and safety.     Plan:  >Legal: 9.39  >Obs: Routine, no current SI. no need for CO, patient not expected to pose risk to self or others in controlled inpatient setting  >Psychiatric Meds: Seroquel 50mg qhs continued;  >>>but also started abilify 5 mg qhs and uptitrated to 10 mg qhs TUES and 15 mg qhs THURS and then 20 mg qhs and monitor- possibly needs 20-30 mg qhs;  Gabapentin 300mg qhs now as prn and add 300 mg QID PRN for anxiety, titrate as tolerated.  Observe all for tolerability and efficacy. Patient had been poorly adherent prior to admission.   Goal of CASTANEDA for safety and compliance- Aristada vs invega then Sustenna CASTANEDA  PRN medications:  Ativan 2mg oral Q6HR PRN for agitation and anxiety.  Haldol 5mg oral Q6HR PRN for agitation.   Benadryl 50mg oral Q6HR PRN for agitation.   Vistaril 50mg oral Q6HR PRN for anxiety.  Desyrel 50mg oral QHS PRN for insomnia.   >Labs: Admission labs reviewed, no acute findings. utox +methadone. Labs pending for tomorrow: A1c and Lipid panel. Hold antipsychotics if QTc >500  >Medical:   No acute concerns. No consultations needed at this time. No indication for CIWA. Patient with consistently stable VS, no visible physical symptoms of withdrawal.   During the course of treatment, will collaborate with medical team to manage medical issues.  >Diet: Regular  >Social: milieu/structured therapy  >Treatment Interventions: Groups and Individual Therapy/CBT, Motivational counseling for substance abuse related issues. Consider CASTANEDA  >Dispo: Collateral and dispo planning pending further symptom and medication optimization

## 2022-11-21 NOTE — BH TREATMENT PLAN - NSTXPLANTHERAPYSESSIONSFT_PSY_ALL_CORE
11-20-22  Type of therapy: Other  Type of session: Individual  Level of patient participation: Participated with encouragement  Duration of participation: Less than 15 minutes  Therapy conducted by: Psych rehab  Therapy Summary: Writer met with patient to review progress towards psychiatric rehabilitation goal and to provide supportive counseling/motivational interviewing.   Patient reported doing positive things for herself, such as eating healthy as goal progress over the last 7 days. Patient was unable to identify any significant challenges despite feedback and encouragement from writer and she denied needing to improve anything over the next 7 days. Patient denied needing further staff support and ended session abruptly without explanation; patient denied distress when writer inquired. Patient denied suicidal ideation, homicidal ideation, psychotic symptoms, mood symptoms; presentation was mostly congruent. Patient exhibited irritable edge, restlessness, and made little eye contact with writer throughout session.    11-20-22  Type of therapy: Coping skills,Creative arts therapy,Inspiration and motiviation,Mindfulness,Peer advocate,Safety planning,Self esteem,Other  Type of session: Group  Level of patient participation: Participated with encouragement,Resistance to participation,Intermittently engaged  Duration of participation: Approximately 20 minutes  Therapy conducted by: Psych rehab  Therapy Summary: Patient has partially attended approximately 70% of daily psychiatric rehabilitation groups over the last 7 days. Patient was partially engaged/often internally preoccupied during group discussions/activities, participating intermittently upon prompt from facilitator. Patient demonstrated fair behavioral control in group setting and she has agreed to maintain attendance over the next 7 days.  
  11-20-22  Type of therapy: Other  Type of session: Individual  Level of patient participation: Participated with encouragement  Duration of participation: Less than 15 minutes  Therapy conducted by: Psych rehab  Therapy Summary: Writer met with patient to review progress towards psychiatric rehabilitation goal and to provide supportive counseling/motivational interviewing.   Patient reported doing positive things for herself, such as eating healthy as goal progress over the last 7 days. Patient was unable to identify any significant challenges despite feedback and encouragement from writer and she denied needing to improve anything over the next 7 days. Patient denied needing further staff support and ended session abruptly without explanation; patient denied distress when writer inquired. Patient denied suicidal ideation, homicidal ideation, psychotic symptoms, mood symptoms; presentation was mostly congruent. Patient exhibited irritable edge, restlessness, and made little eye contact with writer throughout session.    11-20-22  Type of therapy: Coping skills,Creative arts therapy,Inspiration and motiviation,Mindfulness,Peer advocate,Safety planning,Self esteem,Other  Type of session: Group  Level of patient participation: Participated with encouragement,Resistance to participation,Intermittently engaged  Duration of participation: Approximately 20 minutes  Therapy conducted by: Psych rehab  Therapy Summary: Patient has partially attended approximately 70% of daily psychiatric rehabilitation groups over the last 7 days. Patient was partially engaged/often internally preoccupied during group discussions/activities, participating intermittently upon prompt from facilitator. Patient demonstrated fair behavioral control in group setting and she has agreed to maintain attendance over the next 7 days.

## 2022-11-21 NOTE — BH INPATIENT PSYCHIATRY PROGRESS NOTE - CURRENT MEDICATION
MEDICATIONS  (STANDING):  ARIPiprazole 20 milliGRAM(s) Oral at bedtime  QUEtiapine 50 milliGRAM(s) Oral at bedtime    MEDICATIONS  (PRN):  gabapentin 400 milliGRAM(s) Oral four times a day PRN anixety  gabapentin 300 milliGRAM(s) Oral at bedtime PRN mood  haloperidol     Tablet 5 milliGRAM(s) Oral every 6 hours PRN agitation  haloperidol    Injectable 5 milliGRAM(s) IntraMuscular Once PRN agitation  LORazepam   Injectable 2 milliGRAM(s) IntraMuscular Once PRN Agitation  nicotine  Polacrilex Gum 4 milliGRAM(s) Oral every 3 hours PRN nicotine cravings  nicotine -  14 mG/24Hr(s) Patch 1 Patch Transdermal daily PRN nicotine cravings

## 2022-11-21 NOTE — BH INPATIENT PSYCHIATRY PROGRESS NOTE - OTHER
pt narrative vague with paucity of ideas and affect both, markedly avoidant, guarded but slightly attenuated Unable to engage fully in interview, gaurded, evasive, presumed paranoid, cannot tolerate interview well partially cooperative, improving rapport with team and MD

## 2022-11-22 PROCEDURE — 90853 GROUP PSYCHOTHERAPY: CPT

## 2022-11-22 RX ORDER — LITHIUM CARBONATE 300 MG/1
900 TABLET, EXTENDED RELEASE ORAL AT BEDTIME
Refills: 0 | Status: DISCONTINUED | OUTPATIENT
Start: 2022-11-23 | End: 2022-12-14

## 2022-11-22 RX ORDER — ARIPIPRAZOLE 15 MG/1
25 TABLET ORAL AT BEDTIME
Refills: 0 | Status: DISCONTINUED | OUTPATIENT
Start: 2022-11-22 | End: 2022-11-28

## 2022-11-22 RX ORDER — LITHIUM CARBONATE 300 MG/1
450 TABLET, EXTENDED RELEASE ORAL AT BEDTIME
Refills: 0 | Status: COMPLETED | OUTPATIENT
Start: 2022-11-22 | End: 2022-11-22

## 2022-11-22 RX ADMIN — Medication 1 PATCH: at 19:15

## 2022-11-22 RX ADMIN — QUETIAPINE FUMARATE 50 MILLIGRAM(S): 200 TABLET, FILM COATED ORAL at 20:36

## 2022-11-22 RX ADMIN — LITHIUM CARBONATE 450 MILLIGRAM(S): 300 TABLET, EXTENDED RELEASE ORAL at 20:37

## 2022-11-22 RX ADMIN — Medication 4 MILLIGRAM(S): at 16:32

## 2022-11-22 RX ADMIN — ARIPIPRAZOLE 25 MILLIGRAM(S): 15 TABLET ORAL at 20:36

## 2022-11-22 RX ADMIN — GABAPENTIN 400 MILLIGRAM(S): 400 CAPSULE ORAL at 08:55

## 2022-11-22 RX ADMIN — Medication 1 PATCH: at 08:54

## 2022-11-22 RX ADMIN — Medication 4 MILLIGRAM(S): at 08:55

## 2022-11-22 NOTE — BH INPATIENT PSYCHIATRY PROGRESS NOTE - NSBHASSESSSUMMFT_PSY_ALL_CORE
26 y/o single woman.  Patient has PPHx of historical diagnoses of Borderline Personality Disorder, schizoaffective disorder, r/o MDD, Bipolar II Disorder, IED. Patient has 2 prior admissions, last hospitalized at [facility] 2016, and [place] 2018. Patient has hx of drug use (Xanax, cocaine, MJ, opiates, hx of inpatient rehab). Patient is BIB father.  Patient is now hospitalized with a primary problem of worsening depression, agitation, aggression, and emotional dysregulation. Patient admitted to [facility] on a 9.39 legal status. This patient is at elevated acute risk of harm to self and others due to worsening and  escalating behavioral disturbances. She warrants inpatient hospitalization due to recent violent and unpredictable behavior related to acute mental illness, likely to result in harm to self and another person. Patient continues to need inpatient treatment for stabilization and safety.     Plan:  >Legal: 9.39  >Obs: Routine, no current SI. no need for CO, patient not expected to pose risk to self or others in controlled inpatient setting  >Psychiatric Meds: Seroquel 50mg qhs continued;  >>>but also started abilify 5 mg qhs and uptitrated to 10 mg qhs TUES and 15 mg qhs THURS and then 20 mg qhs and monitor- possibly needs 20-30 mg qhs;  Gabapentin 300mg qhs now as prn and add 300 mg QID PRN for anxiety, titrate as tolerated.  Observe all for tolerability and efficacy. Patient had been poorly adherent prior to admission.   Goal of CASTANEDA for safety and compliance- Aristada vs invega then Sustenna CASTANEDA  Start lithium  then 900 qhs and send levels after 5 days  PRN medications:  Ativan 2mg oral Q6HR PRN for agitation and anxiety.  Haldol 5mg oral Q6HR PRN for agitation.   Benadryl 50mg oral Q6HR PRN for agitation.   Vistaril 50mg oral Q6HR PRN for anxiety.  Desyrel 50mg oral QHS PRN for insomnia.   >Labs: Admission labs reviewed, no acute findings. utox +methadone. Labs pending for tomorrow: A1c and Lipid panel. Hold antipsychotics if QTc >500  >Medical:   No acute concerns. No consultations needed at this time. No indication for CIWA. Patient with consistently stable VS, no visible physical symptoms of withdrawal.   During the course of treatment, will collaborate with medical team to manage medical issues.  >Diet: Regular  >Social: milieu/structured therapy  >Treatment Interventions: Groups and Individual Therapy/CBT, Motivational counseling for substance abuse related issues. Consider CASTANEDA  >Dispo: Collateral and dispo planning pending further symptom and medication optimization
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
## 2022-11-22 NOTE — BH INPATIENT PSYCHIATRY PROGRESS NOTE - NSBHMSEMOOD_PSY_A_CORE
Patient:   SHAZIA NUÑEZ            MRN: [id number]            FIN: [id number]              Age:   64 years     Sex:  MALE     :  57   Associated Diagnoses:   None   Author:   ESTEBAN, RENE HERNÁNDEZ     Subjective    ml  had colonoscopy today  BS low  d/w RN  feels OK     Health Status   Current medications: Medications (22) Active  Scheduled: (9)  Aspirin 81 mg DR tab  81 mg 1 tab, Oral, Daily  Atorvastatin 40 mg tab  40 mg 1 tab, Oral, Daily  Cyanocobalamin 500 mcg tab  1,000 mcg 2 tab, Oral, Daily  Heparin 5,000 unit/1 mL inj  5,000 unit 1 mL, Subcutaneous, Q8H  Insulin human lispro 1 unit/0.01 mL inj  1-6 units, Subcutaneous, QID [with meals & HS]  Isosorbide mononitrate 30 mg ER tab  30 mg 1 tab, Oral, QAM  Mirtazapine 7.5 mg/0.5 tab (1/2 of 15 mg)  7.5 mg 0.5 tab, Oral, Q Evening  Sevelamer carbonate 800 mg tab  1,600 mg 2 tab, Oral, TID [with meals]  Venlafaxine 75 mg XR cap  75 mg 1 cap, Oral, Q Bedtime  Continuous: (1)  Dextrose 5% - 0.9% NaCl 1,000 mL  1,000 mL, IV, 60 mL/hr  PRN: (12)  Acetaminophen 500 mg tab  500 mg 1 tab, Oral, Q6H  Albuterol HFA 90 mcg oral MDI 8 gm  180 mcg 2 puff, MDI/DPI, Q6H  Calcium carbonate 500 mg chew tab [Ca 200 mg]  500 mg 1 tab, Chewed, BID  Dextrose (glucose) 40% 15 gm/37.5 gm oral gel UD  15 gm, Oral, As Directed PRN  Dextrose (glucose) 50% 25 gm/50 mL syringe  12.5 gm 25 mL, IV Push, As Directed PRN  DiphenhydrAMINE 25 mg cap  25 mg 1 cap, Oral, Q6H  Glucagon 1 mg/1 mL emergency kit SDV  1 mg 1 mL, IM, As Directed PRN  Hydrocodone-acetaminophen  mg tab  1 tab, Oral, Q6H  LacTULOSE 10 gm/15 mL oral syrup repack  20 gm 30 mL, Oral, TID  Milk of magnesia 8% 30 mL oral susp UD  30 mL, Oral, BID  Morphine PF 4 mg/1 mL inj SDV  4 mg 1 mL, IV Push, Q4H  Ondansetron 4 mg/2 mL inj SDV  4 mg 2 mL, Slow IV Push, Q6H        Objective   Intake and Output   I & O between:  2020 15:15 TO 2020 15:15  Med Dosing Weight:  94.4  kg    16-JAN-2020  24 Hour Intake:   1580.00  ( 16.74 mL/kg )  24 Hour Output:   400.00           24 Hour Urine/Stool Output:   0.0  24 Hour Balance:   1180.00           24 Hour Urine Output:   400.00  ( 0.18 mL/kg/hr )                    Stool Count:  5.00         Vitals between:   19-JAN-2020 15:15:56   TO   20-JAN-2020 15:15:56                   LAST RESULT MINIMUM MAXIMUM  Temperature 36.5 36.4 36.5  Heart Rate 70 65 80  Respiratory Rate 18 16 18  NISBP           139 116 146  NIDBP           71 69 81  NIMBP           94 87 103  SpO2                    100 99 100      General:  Alert and oriented, No acute distress.    Eye:  Extraocular movements are intact.    HENT:  Normocephalic, Oral mucosa is moist.    Respiratory:  Lungs are clear to auscultation, Respirations are non-labored.   Cardiovascular:  Normal rate, Regular rhythm, no rub.    Gastrointestinal:  Soft, Non-tender, Non-distended.    Lymphatics:  no LE edema.    Integumentary:  Warm, Dry, No rash.    Neurologic:  Alert, Oriented.    Psychiatric:  Cooperative, Appropriate mood & affect.      Results Review   Labs between:  19-JAN-2020 15:15 to 20-JAN-2020 15:15  POC GLU:                 Latest Result  Latest Date  Minimum  Min Date  Maximum  Max Date                             77 20-JAN-2020 76 19-JAN-2020 79 19-JAN-2020                       Impression and Plan   ESRD on HD TTS:  - pt will have next HD on tuesday  Fecal impaction: treated by GI  - colonoscopy today  ascites: s/p paracentesis 1/17  chest pain: resolved.  cardiology following   Depressed/Anxious/Angry

## 2022-11-22 NOTE — BH INPATIENT PSYCHIATRY PROGRESS NOTE - CURRENT MEDICATION
MEDICATIONS  (STANDING):  ARIPiprazole 30 milliGRAM(s) Oral at bedtime  lithium CR (ESKALITH-CR) 900 milliGRAM(s) Oral at bedtime  nicotine -  14 mG/24Hr(s) Patch 1 Patch Transdermal once    MEDICATIONS  (PRN):  gabapentin 400 milliGRAM(s) Oral four times a day PRN anixety  haloperidol     Tablet 5 milliGRAM(s) Oral every 6 hours PRN agitation  haloperidol    Injectable 5 milliGRAM(s) IntraMuscular Once PRN agitation  nicotine  Polacrilex Gum 4 milliGRAM(s) Oral every 3 hours PRN nicotine cravings  nicotine -  14 mG/24Hr(s) Patch 1 Patch Transdermal daily PRN nicotine cravings  QUEtiapine 50 milliGRAM(s) Oral at bedtime PRN mood

## 2022-11-22 NOTE — BH INPATIENT PSYCHIATRY PROGRESS NOTE - NSBHCHARTREVIEWVS_PSY_A_CORE FT
Vital Signs Last 24 Hrs  T(C): 36.4 (11-28-22 @ 18:39), Max: 36.9 (11-27-22 @ 20:01)  T(F): 97.6 (11-28-22 @ 18:39), Max: 98.4 (11-27-22 @ 20:01)  HR: 110 (11-28-22 @ 09:03) (110 - 110)  BP: --  BP(mean): --  RR: 18 (11-28-22 @ 09:03) (18 - 18)  SpO2: 99% (11-28-22 @ 09:03) (99% - 99%)    Orthostatic VS  11-28-22 @ 07:04  Lying BP: --/-- HR: --  Sitting BP: 116/77 HR: 100  Standing BP: 102/62 HR: 122  Site: --  Mode: --  Orthostatic VS  11-27-22 @ 07:25  Lying BP: --/-- HR: --  Sitting BP: 131/86 HR: 92  Standing BP: --/-- HR: --  Site: --  Mode: --

## 2022-11-22 NOTE — BH INPATIENT PSYCHIATRY PROGRESS NOTE - OTHER
Fair, but at times quite poor, guarded, evasive and paranoid vague with paucity of ideas and affect both, markedly avoidant, guarded but slightly attenuated partially cooperative at times, improving rapport with team and MD; remains childlike and regressed, also impulsive, frequently gets angry and leaves session with MD, as in PM today Unable to engage fully in interview, gaurded, evasive, presumed paranoid, cannot tolerate interview well pt narrative

## 2022-11-22 NOTE — BH INPATIENT PSYCHIATRY PROGRESS NOTE - PRN MEDS
MEDICATIONS  (PRN):  gabapentin 400 milliGRAM(s) Oral four times a day PRN anixety  haloperidol     Tablet 5 milliGRAM(s) Oral every 6 hours PRN agitation  haloperidol    Injectable 5 milliGRAM(s) IntraMuscular Once PRN agitation  nicotine  Polacrilex Gum 4 milliGRAM(s) Oral every 3 hours PRN nicotine cravings  nicotine -  14 mG/24Hr(s) Patch 1 Patch Transdermal daily PRN nicotine cravings  QUEtiapine 50 milliGRAM(s) Oral at bedtime PRN mood

## 2022-11-23 RX ORDER — NICOTINE POLACRILEX 2 MG
1 GUM BUCCAL ONCE
Refills: 0 | Status: COMPLETED | OUTPATIENT
Start: 2022-11-23 | End: 2022-11-29

## 2022-11-23 RX ADMIN — LITHIUM CARBONATE 900 MILLIGRAM(S): 300 TABLET, EXTENDED RELEASE ORAL at 20:25

## 2022-11-23 RX ADMIN — Medication 4 MILLIGRAM(S): at 09:09

## 2022-11-23 RX ADMIN — Medication 1 PATCH: at 09:09

## 2022-11-23 RX ADMIN — Medication 4 MILLIGRAM(S): at 14:32

## 2022-11-23 RX ADMIN — Medication 1 PATCH: at 19:25

## 2022-11-23 RX ADMIN — QUETIAPINE FUMARATE 50 MILLIGRAM(S): 200 TABLET, FILM COATED ORAL at 20:24

## 2022-11-23 RX ADMIN — ARIPIPRAZOLE 25 MILLIGRAM(S): 15 TABLET ORAL at 20:24

## 2022-11-23 RX ADMIN — GABAPENTIN 400 MILLIGRAM(S): 400 CAPSULE ORAL at 18:40

## 2022-11-23 RX ADMIN — HALOPERIDOL DECANOATE 5 MILLIGRAM(S): 100 INJECTION INTRAMUSCULAR at 09:10

## 2022-11-23 RX ADMIN — Medication 4 MILLIGRAM(S): at 11:12

## 2022-11-23 RX ADMIN — Medication 4 MILLIGRAM(S): at 05:49

## 2022-11-23 NOTE — BH INPATIENT PSYCHIATRY PROGRESS NOTE - OTHER
Unable to engage fully in interview, gaurded, evasive, presumed paranoid, cannot tolerate interview well vague with paucity of ideas and affect both, markedly avoidant, guarded but slightly attenuated partially cooperative, improving rapport with team and MD pt narrative

## 2022-11-23 NOTE — BH INPATIENT PSYCHIATRY PROGRESS NOTE - NSBHCHARTREVIEWVS_PSY_A_CORE FT
Vital Signs Last 24 Hrs  T(C): 36.6 (11-25-22 @ 06:52), Max: 36.7 (11-24-22 @ 20:57)  T(F): 97.9 (11-25-22 @ 06:52), Max: 98 (11-24-22 @ 20:57)  HR: --  BP: --  BP(mean): --  RR: --  SpO2: --    Orthostatic VS  11-25-22 @ 06:52  Lying BP: --/-- HR: --  Sitting BP: 106/63 HR: 103  Standing BP: --/-- HR: --  Site: --  Mode: --  Orthostatic VS  11-24-22 @ 07:58  Lying BP: --/-- HR: --  Sitting BP: 121/71 HR: 108  Standing BP: --/-- HR: --  Site: --  Mode: --

## 2022-11-23 NOTE — BH INPATIENT PSYCHIATRY PROGRESS NOTE - PRN MEDS
MEDICATIONS  (PRN):  gabapentin 400 milliGRAM(s) Oral four times a day PRN anixety  gabapentin 300 milliGRAM(s) Oral at bedtime PRN mood  haloperidol     Tablet 5 milliGRAM(s) Oral every 6 hours PRN agitation  haloperidol    Injectable 5 milliGRAM(s) IntraMuscular Once PRN agitation  nicotine  Polacrilex Gum 4 milliGRAM(s) Oral every 3 hours PRN nicotine cravings  nicotine -  14 mG/24Hr(s) Patch 1 Patch Transdermal daily PRN nicotine cravings

## 2022-11-23 NOTE — BH INPATIENT PSYCHIATRY PROGRESS NOTE - NSBHASSESSSUMMFT_PSY_ALL_CORE
26 y/o single woman.  Patient has PPHx of historical diagnoses of Borderline Personality Disorder, schizoaffective disorder, r/o MDD, Bipolar II Disorder, IED. Patient has 2 prior admissions, last hospitalized at [facility] 2016, and [place] 2018. Patient has hx of drug use (Xanax, cocaine, MJ, opiates, hx of inpatient rehab). Patient is BIB father.  Patient is now hospitalized with a primary problem of worsening depression, agitation, aggression, and emotional dysregulation. Patient admitted to [facility] on a 9.39 legal status.     This patient is at elevated acute risk of harm to self and others due to worsening and  escalating behavioral disturbances. She warrants inpatient hospitalization due to recent violent and unpredictable behavior related to acute mental illness, likely to result in harm to self and another person. Patient continues to need inpatient treatment for stabilization and safety.    Continued symptoms of acute psychosis, disorganized, illogical, paranoid, reporting medication being provided is helpful, denies SEs. Somewhat pressured though redirectable.      Plan:  >Legal: 9.39  >Obs: Routine, no current SI. no need for CO, patient not expected to pose risk to self or others in controlled inpatient setting  >Psychiatric Meds: Seroquel 50mg qhs continued;  >>>but also started abilify 5 mg qhs and uptitrated to 10 mg qhs TUES and 15 mg qhs THURS and then 20 mg qhs and monitor- possibly needs 20-30 mg qhs;  Gabapentin 300mg qhs now as prn and add 300 mg QID PRN for anxiety, titrate as tolerated.  Observe all for tolerability and efficacy. Patient had been poorly adherent prior to admission.   Goal of CASTANEDA for safety and compliance- Aristada vs invega then Sustenna CASTANEDA  PRN medications:  Ativan 2mg oral Q6HR PRN for agitation and anxiety.  Haldol 5mg oral Q6HR PRN for agitation.   Benadryl 50mg oral Q6HR PRN for agitation.   Vistaril 50mg oral Q6HR PRN for anxiety.  Desyrel 50mg oral QHS PRN for insomnia.   >Labs: Admission labs reviewed, no acute findings. utox +methadone. Labs pending for tomorrow: A1c and Lipid panel. Hold antipsychotics if QTc >500  >Medical:   No acute concerns. No consultations needed at this time. No indication for CIWA. Patient with consistently stable VS, no visible physical symptoms of withdrawal.   During the course of treatment, will collaborate with medical team to manage medical issues.  >Diet: Regular  >Social: milieu/structured therapy  >Treatment Interventions: Groups and Individual Therapy/CBT, Motivational counseling for substance abuse related issues. Consider CASTANEDA  >Dispo: Collateral and dispo planning pending further symptom and medication optimization

## 2022-11-23 NOTE — BH INPATIENT PSYCHIATRY PROGRESS NOTE - NSBHFUPINTERVALHXFT_PSY_A_CORE
Discussed with nursing, no acute events overnight, finding medication beneficial, denies significant SEs, appreciates working with Dr Plasencia, prefers working with him, limited willingness to engage in discussion with writer, remains disorganized and somewhat pressured, reporting feeling much better. Somewhat oddly related, tangential, paranoid. Reports sleeping and eating well. Denies SI/HI.

## 2022-11-23 NOTE — BH INPATIENT PSYCHIATRY PROGRESS NOTE - CURRENT MEDICATION
MEDICATIONS  (STANDING):  ARIPiprazole 25 milliGRAM(s) Oral at bedtime  lithium CR (ESKALITH-CR) 900 milliGRAM(s) Oral at bedtime  nicotine -  14 mG/24Hr(s) Patch 1 Patch Transdermal once  QUEtiapine 50 milliGRAM(s) Oral at bedtime    MEDICATIONS  (PRN):  gabapentin 400 milliGRAM(s) Oral four times a day PRN anixety  gabapentin 300 milliGRAM(s) Oral at bedtime PRN mood  haloperidol     Tablet 5 milliGRAM(s) Oral every 6 hours PRN agitation  haloperidol    Injectable 5 milliGRAM(s) IntraMuscular Once PRN agitation  nicotine  Polacrilex Gum 4 milliGRAM(s) Oral every 3 hours PRN nicotine cravings  nicotine -  14 mG/24Hr(s) Patch 1 Patch Transdermal daily PRN nicotine cravings

## 2022-11-24 RX ADMIN — Medication 4 MILLIGRAM(S): at 02:17

## 2022-11-24 RX ADMIN — LITHIUM CARBONATE 900 MILLIGRAM(S): 300 TABLET, EXTENDED RELEASE ORAL at 20:31

## 2022-11-24 RX ADMIN — HALOPERIDOL DECANOATE 5 MILLIGRAM(S): 100 INJECTION INTRAMUSCULAR at 18:20

## 2022-11-24 RX ADMIN — GABAPENTIN 400 MILLIGRAM(S): 400 CAPSULE ORAL at 17:20

## 2022-11-24 RX ADMIN — GABAPENTIN 400 MILLIGRAM(S): 400 CAPSULE ORAL at 02:17

## 2022-11-24 RX ADMIN — ARIPIPRAZOLE 25 MILLIGRAM(S): 15 TABLET ORAL at 20:32

## 2022-11-24 RX ADMIN — Medication 4 MILLIGRAM(S): at 17:20

## 2022-11-24 RX ADMIN — QUETIAPINE FUMARATE 50 MILLIGRAM(S): 200 TABLET, FILM COATED ORAL at 20:32

## 2022-11-25 PROCEDURE — 99232 SBSQ HOSP IP/OBS MODERATE 35: CPT

## 2022-11-25 PROCEDURE — 90853 GROUP PSYCHOTHERAPY: CPT

## 2022-11-25 RX ADMIN — Medication 4 MILLIGRAM(S): at 09:06

## 2022-11-25 RX ADMIN — Medication 1 PATCH: at 20:13

## 2022-11-25 RX ADMIN — LITHIUM CARBONATE 900 MILLIGRAM(S): 300 TABLET, EXTENDED RELEASE ORAL at 20:27

## 2022-11-25 RX ADMIN — Medication 4 MILLIGRAM(S): at 20:27

## 2022-11-25 RX ADMIN — Medication 4 MILLIGRAM(S): at 05:48

## 2022-11-25 RX ADMIN — Medication 1 PATCH: at 09:04

## 2022-11-25 RX ADMIN — GABAPENTIN 400 MILLIGRAM(S): 400 CAPSULE ORAL at 05:55

## 2022-11-25 RX ADMIN — ARIPIPRAZOLE 25 MILLIGRAM(S): 15 TABLET ORAL at 20:27

## 2022-11-25 RX ADMIN — QUETIAPINE FUMARATE 50 MILLIGRAM(S): 200 TABLET, FILM COATED ORAL at 20:27

## 2022-11-25 RX ADMIN — Medication 4 MILLIGRAM(S): at 13:41

## 2022-11-25 RX ADMIN — GABAPENTIN 400 MILLIGRAM(S): 400 CAPSULE ORAL at 13:22

## 2022-11-25 NOTE — BH INPATIENT PSYCHIATRY PROGRESS NOTE - NSBHFUPINTERVALHXFT_PSY_A_CORE
Patient was seen and is evaluated in the interview room. She reports having good sleep and appetite yesterday. Patient is compliant with standing medications, aware of eventual treatment with Abilify CASTANEDA, denies any SE. States that she awoke early this morning and started writing as she couldn't fall back asleep. Patient verbalizes writing down passwords, banking and credit card information, and SSN on paper with the intention of tearing it up later. Patient is disorganized in thought, oddly related, preoccupied with knowing behaviors expected for discharge. Denies any S/H ideations, plan or intent. She was observed to be participating in groups, encouraged to maintain compliance. No acute medical concerns, VSS. Continue to provide therapeutic support as needed.

## 2022-11-25 NOTE — BH INPATIENT PSYCHIATRY PROGRESS NOTE - OTHER
pt narrative partially cooperative, improving rapport with team and MD Unable to engage fully in interview, gaurded, evasive, presumed paranoid, cannot tolerate interview well vague with paucity of ideas and affect both, markedly avoidant, guarded but slightly attenuated

## 2022-11-25 NOTE — BH INPATIENT PSYCHIATRY PROGRESS NOTE - NSBHASSESSSUMMFT_PSY_ALL_CORE
28 y/o single woman.  Patient has PPHx of historical diagnoses of Borderline Personality Disorder, schizoaffective disorder, r/o MDD, Bipolar II Disorder, IED. Patient has 2 prior admissions, last hospitalized at [facility] 2016, and [place] 2018. Patient has hx of drug use (Xanax, cocaine, MJ, opiates, hx of inpatient rehab). Patient is BIB father.  Patient is now hospitalized with a primary problem of worsening depression, agitation, aggression, and emotional dysregulation. Patient admitted to [facility] on a 9.39 legal status.     This patient is at elevated acute risk of harm to self and others due to worsening and  escalating behavioral disturbances. She warrants inpatient hospitalization due to recent violent and unpredictable behavior related to acute mental illness, likely to result in harm to self and another person. Patient continues to need inpatient treatment for stabilization and safety.    Continued symptoms of acute psychosis, disorganized, illogical, paranoid, reporting medication being provided is helpful, denies SEs. Somewhat pressured though redirectable.      Plan:  >Legal: 9.39  >Obs: Routine, no current SI. no need for CO, patient not expected to pose risk to self or others in controlled inpatient setting  >Psychiatric Meds: Seroquel 50mg qhs continued;  >>>but also started abilify 5 mg qhs and uptitrated to 10 mg qhs TUES and 15 mg qhs THURS and then 20 mg qhs and monitor- possibly needs 20-30 mg qhs;  Gabapentin 300mg qhs now as prn and add 300 mg QID PRN for anxiety, titrate as tolerated.  Observe all for tolerability and efficacy. Patient had been poorly adherent prior to admission.   Goal of CASTANEDA for safety and compliance- Aristada vs invega then Sustenna CASTANEDA  PRN medications:  Ativan 2mg oral Q6HR PRN for agitation and anxiety.  Haldol 5mg oral Q6HR PRN for agitation.   Benadryl 50mg oral Q6HR PRN for agitation.   Vistaril 50mg oral Q6HR PRN for anxiety.  Desyrel 50mg oral QHS PRN for insomnia.   >Labs: Admission labs reviewed, no acute findings. utox +methadone. Labs pending for tomorrow: A1c and Lipid panel. Hold antipsychotics if QTc >500  >Medical:   No acute concerns. No consultations needed at this time. No indication for CIWA. Patient with consistently stable VS, no visible physical symptoms of withdrawal.   During the course of treatment, will collaborate with medical team to manage medical issues.  >Diet: Regular  >Social: milieu/structured therapy  >Treatment Interventions: Groups and Individual Therapy/CBT, Motivational counseling for substance abuse related issues. Consider CASTANEDA  >Dispo: Collateral and dispo planning pending further symptom and medication optimization

## 2022-11-26 RX ADMIN — GABAPENTIN 400 MILLIGRAM(S): 400 CAPSULE ORAL at 13:28

## 2022-11-26 RX ADMIN — ARIPIPRAZOLE 25 MILLIGRAM(S): 15 TABLET ORAL at 20:36

## 2022-11-26 RX ADMIN — QUETIAPINE FUMARATE 50 MILLIGRAM(S): 200 TABLET, FILM COATED ORAL at 20:36

## 2022-11-26 RX ADMIN — GABAPENTIN 400 MILLIGRAM(S): 400 CAPSULE ORAL at 08:53

## 2022-11-26 RX ADMIN — Medication 1 PATCH: at 20:36

## 2022-11-26 RX ADMIN — LITHIUM CARBONATE 900 MILLIGRAM(S): 300 TABLET, EXTENDED RELEASE ORAL at 20:36

## 2022-11-26 RX ADMIN — Medication 4 MILLIGRAM(S): at 12:04

## 2022-11-26 RX ADMIN — Medication 4 MILLIGRAM(S): at 08:55

## 2022-11-26 RX ADMIN — HALOPERIDOL DECANOATE 5 MILLIGRAM(S): 100 INJECTION INTRAMUSCULAR at 08:54

## 2022-11-26 RX ADMIN — Medication 1 PATCH: at 10:57

## 2022-11-27 RX ADMIN — Medication 1 PATCH: at 07:52

## 2022-11-27 RX ADMIN — GABAPENTIN 400 MILLIGRAM(S): 400 CAPSULE ORAL at 08:47

## 2022-11-27 RX ADMIN — LITHIUM CARBONATE 900 MILLIGRAM(S): 300 TABLET, EXTENDED RELEASE ORAL at 20:31

## 2022-11-27 RX ADMIN — Medication 4 MILLIGRAM(S): at 08:48

## 2022-11-27 RX ADMIN — HALOPERIDOL DECANOATE 5 MILLIGRAM(S): 100 INJECTION INTRAMUSCULAR at 08:47

## 2022-11-27 RX ADMIN — QUETIAPINE FUMARATE 50 MILLIGRAM(S): 200 TABLET, FILM COATED ORAL at 20:31

## 2022-11-27 RX ADMIN — GABAPENTIN 400 MILLIGRAM(S): 400 CAPSULE ORAL at 14:36

## 2022-11-27 RX ADMIN — ARIPIPRAZOLE 25 MILLIGRAM(S): 15 TABLET ORAL at 20:31

## 2022-11-28 LAB — SARS-COV-2 RNA SPEC QL NAA+PROBE: DETECTED

## 2022-11-28 PROCEDURE — 99232 SBSQ HOSP IP/OBS MODERATE 35: CPT

## 2022-11-28 RX ORDER — QUETIAPINE FUMARATE 200 MG/1
50 TABLET, FILM COATED ORAL AT BEDTIME
Refills: 0 | Status: DISCONTINUED | OUTPATIENT
Start: 2022-11-28 | End: 2022-12-14

## 2022-11-28 RX ORDER — ARIPIPRAZOLE 15 MG/1
30 TABLET ORAL AT BEDTIME
Refills: 0 | Status: DISCONTINUED | OUTPATIENT
Start: 2022-11-28 | End: 2022-12-05

## 2022-11-28 RX ADMIN — Medication 4 MILLIGRAM(S): at 22:29

## 2022-11-28 RX ADMIN — Medication 4 MILLIGRAM(S): at 10:35

## 2022-11-28 RX ADMIN — GABAPENTIN 400 MILLIGRAM(S): 400 CAPSULE ORAL at 06:52

## 2022-11-28 RX ADMIN — ARIPIPRAZOLE 30 MILLIGRAM(S): 15 TABLET ORAL at 20:14

## 2022-11-28 RX ADMIN — LITHIUM CARBONATE 900 MILLIGRAM(S): 300 TABLET, EXTENDED RELEASE ORAL at 20:14

## 2022-11-28 NOTE — CHART NOTE - NSCHARTNOTEFT_GEN_A_CORE
Asked to review case as patient tested positive for COVID-19.   Vital signs reviewed, no hypoxia on room air, apart from tachycardia with . This can continue to be monitored at [facility]. No indication for inpatient medical hospitalization at this time. Continue to monitor vital signs, including SpO2 and RR.   Staff told me she is asymptomatic, and therefore is not a candidate for Paxlovid.

## 2022-11-28 NOTE — BH INPATIENT PSYCHIATRY PROGRESS NOTE - NSBHASSESSSUMMFT_PSY_ALL_CORE
28 y/o single woman.  Patient has PPHx of historical diagnoses of Borderline Personality Disorder, schizoaffective disorder, r/o MDD, Bipolar II Disorder, IED. Patient has 2 prior admissions, last hospitalized at [facility] 2016, and [place] 2018. Patient has hx of drug use (Xanax, cocaine, MJ, opiates, hx of inpatient rehab). Patient is BIB father.  Patient is now hospitalized with a primary problem of worsening depression, agitation, aggression, and emotional dysregulation. Patient admitted to [facility] on a 9.39 legal status.     This patient is at elevated acute risk of harm to self and others due to worsening and  escalating behavioral disturbances. She warrants inpatient hospitalization due to recent violent and unpredictable behavior related to acute mental illness, likely to result in harm to self and another person. Patient continues to need inpatient treatment for stabilization and safety.    Continued symptoms of acute psychosis, disorganized, illogical, paranoid, reporting medication being provided is helpful, denies SEs. Somewhat pressured though redirectable.      Plan:  >Legal: 9.39  >Obs: Routine, no current SI. no need for CO, patient not expected to pose risk to self or others in controlled inpatient setting  >Psychiatric Meds: Seroquel 50mg qhs continued;  >>>but also started abilify 5 mg qhs and uptitrated to 10 mg qhs TUES and 15 mg qhs THURS and then 20 mg qhs and monitor- possibly needs 20-30 mg qhs;  Gabapentin 300mg qhs now as prn and add 300 mg QID PRN for anxiety, titrate as tolerated.  Observe all for tolerability and efficacy. Patient had been poorly adherent prior to admission.   Goal of CASTANEDA for safety and compliance- Aristada vs invega then Sustenna CASTANEDA  PRN medications:  Ativan 2mg oral Q6HR PRN for agitation and anxiety.  Haldol 5mg oral Q6HR PRN for agitation.   Benadryl 50mg oral Q6HR PRN for agitation.   Vistaril 50mg oral Q6HR PRN for anxiety.  Desyrel 50mg oral QHS PRN for insomnia.   >Labs: Admission labs reviewed, no acute findings. utox +methadone. Labs pending for tomorrow: A1c and Lipid panel. Hold antipsychotics if QTc >500  >Medical:   No acute concerns. No consultations needed at this time. No indication for CIWA. Patient with consistently stable VS, no visible physical symptoms of withdrawal.   During the course of treatment, will collaborate with medical team to manage medical issues.  >Diet: Regular  >Social: milieu/structured therapy  >Treatment Interventions: Groups and Individual Therapy/CBT, Motivational counseling for substance abuse related issues. Consider CASTANEDA  >Dispo: Collateral and dispo planning pending further symptom and medication optimization

## 2022-11-28 NOTE — BH INPATIENT PSYCHIATRY PROGRESS NOTE - OTHER
partially cooperative, improving rapport with team and MD pt narrative Unable to engage fully in interview, gaurded, evasive, presumed paranoid, cannot tolerate interview well vague with paucity of ideas and affect both, markedly avoidant, guarded but slightly attenuated

## 2022-11-28 NOTE — BH INPATIENT PSYCHIATRY PROGRESS NOTE - NSBHFUPINTERVALHXFT_PSY_A_CORE
Patient was seen and is evaluated in the interview room. She reports having good sleep and appetite yesterday. Patient is compliant with standing medications, aware of eventual treatment with Abilify CASTANEDA, denies any SE. States eating and sleeping ok. Patient is disorganized in thought, oddly related, preoccupied with knowing behaviors expected for discharge. Denies any S/H ideations, plan or intent. She was observed to be participating in groups, encouraged to maintain compliance. We discussed covid POS test and need for transfer to . Agrees but initially does not want to use a wheelchair, smiles at this. VSS. Continue to provide therapeutic support as needed and will continue to monitor PO abilify else consider trial of invega if inadequate response. Lithium added to qhs regimen last week and levels pending tonight for trough.

## 2022-11-28 NOTE — BH INPATIENT PSYCHIATRY PROGRESS NOTE - NSBHCHARTREVIEWVS_PSY_A_CORE FT
Vital Signs Last 24 Hrs  T(C): 36.4 (11-28-22 @ 18:39), Max: 36.7 (11-28-22 @ 07:04)  T(F): 97.6 (11-28-22 @ 18:39), Max: 98 (11-28-22 @ 07:04)  HR: 110 (11-28-22 @ 09:03) (110 - 110)  BP: --  BP(mean): --  RR: 18 (11-28-22 @ 09:03) (18 - 18)  SpO2: 99% (11-28-22 @ 09:03) (99% - 99%)    Orthostatic VS  11-28-22 @ 19:50  Lying BP: --/-- HR: --  Sitting BP: 144/101 HR: 118  Standing BP: 123/90 HR: 120  Site: --  Mode: --  Orthostatic VS  11-28-22 @ 19:49  Lying BP: --/-- HR: --  Sitting BP: 136/85 HR: 88  Standing BP: 124/79 HR: 97  Site: --  Mode: --  Orthostatic VS  11-28-22 @ 07:04  Lying BP: --/-- HR: --  Sitting BP: 116/77 HR: 100  Standing BP: 102/62 HR: 122  Site: --  Mode: --  Orthostatic VS  11-27-22 @ 07:25  Lying BP: --/-- HR: --  Sitting BP: 131/86 HR: 92  Standing BP: --/-- HR: --  Site: --  Mode: --

## 2022-11-29 LAB
ANION GAP SERPL CALC-SCNC: 13 MMOL/L — SIGNIFICANT CHANGE UP (ref 7–14)
BUN SERPL-MCNC: 5 MG/DL — LOW (ref 7–23)
CALCIUM SERPL-MCNC: 9.5 MG/DL — SIGNIFICANT CHANGE UP (ref 8.4–10.5)
CHLORIDE SERPL-SCNC: 103 MMOL/L — SIGNIFICANT CHANGE UP (ref 98–107)
CO2 SERPL-SCNC: 25 MMOL/L — SIGNIFICANT CHANGE UP (ref 22–31)
CREAT SERPL-MCNC: 0.78 MG/DL — SIGNIFICANT CHANGE UP (ref 0.5–1.3)
EGFR: 107 ML/MIN/1.73M2 — SIGNIFICANT CHANGE UP
GLUCOSE SERPL-MCNC: 103 MG/DL — HIGH (ref 70–99)
LITHIUM SERPL-MCNC: 0.7 MMOL/L — SIGNIFICANT CHANGE UP (ref 0.6–1.2)
POTASSIUM SERPL-MCNC: 3.6 MMOL/L — SIGNIFICANT CHANGE UP (ref 3.5–5.3)
POTASSIUM SERPL-SCNC: 3.6 MMOL/L — SIGNIFICANT CHANGE UP (ref 3.5–5.3)
SODIUM SERPL-SCNC: 141 MMOL/L — SIGNIFICANT CHANGE UP (ref 135–145)

## 2022-11-29 PROCEDURE — 99233 SBSQ HOSP IP/OBS HIGH 50: CPT

## 2022-11-29 RX ORDER — GABAPENTIN 400 MG/1
400 CAPSULE ORAL
Refills: 0 | Status: DISCONTINUED | OUTPATIENT
Start: 2022-11-29 | End: 2022-12-07

## 2022-11-29 RX ORDER — SODIUM CHLORIDE 0.65 %
1 AEROSOL, SPRAY (ML) NASAL THREE TIMES A DAY
Refills: 0 | Status: DISCONTINUED | OUTPATIENT
Start: 2022-11-29 | End: 2022-12-14

## 2022-11-29 RX ORDER — NICOTINE POLACRILEX 2 MG
1 GUM BUCCAL DAILY
Refills: 0 | Status: DISCONTINUED | OUTPATIENT
Start: 2022-11-30 | End: 2022-12-14

## 2022-11-29 RX ADMIN — LITHIUM CARBONATE 900 MILLIGRAM(S): 300 TABLET, EXTENDED RELEASE ORAL at 20:17

## 2022-11-29 RX ADMIN — Medication 4 MILLIGRAM(S): at 20:43

## 2022-11-29 RX ADMIN — Medication 4 MILLIGRAM(S): at 10:26

## 2022-11-29 RX ADMIN — Medication 4 MILLIGRAM(S): at 13:20

## 2022-11-29 RX ADMIN — GABAPENTIN 400 MILLIGRAM(S): 400 CAPSULE ORAL at 08:41

## 2022-11-29 RX ADMIN — Medication 1 PATCH: at 07:55

## 2022-11-29 RX ADMIN — Medication 1 SPRAY(S): at 16:19

## 2022-11-29 RX ADMIN — GABAPENTIN 400 MILLIGRAM(S): 400 CAPSULE ORAL at 11:34

## 2022-11-29 RX ADMIN — Medication 4 MILLIGRAM(S): at 17:51

## 2022-11-29 RX ADMIN — Medication 4 MILLIGRAM(S): at 07:55

## 2022-11-29 RX ADMIN — ARIPIPRAZOLE 30 MILLIGRAM(S): 15 TABLET ORAL at 20:17

## 2022-11-29 RX ADMIN — GABAPENTIN 400 MILLIGRAM(S): 400 CAPSULE ORAL at 16:18

## 2022-11-29 RX ADMIN — QUETIAPINE FUMARATE 50 MILLIGRAM(S): 200 TABLET, FILM COATED ORAL at 01:08

## 2022-11-29 RX ADMIN — GABAPENTIN 400 MILLIGRAM(S): 400 CAPSULE ORAL at 20:56

## 2022-11-29 NOTE — BH INPATIENT PSYCHIATRY PROGRESS NOTE - PRN MEDS
MEDICATIONS  (PRN):  gabapentin 400 milliGRAM(s) Oral four times a day PRN anixety  haloperidol     Tablet 5 milliGRAM(s) Oral every 6 hours PRN agitation  haloperidol    Injectable 5 milliGRAM(s) IntraMuscular Once PRN agitation  nicotine  Polacrilex Gum 4 milliGRAM(s) Oral every 3 hours PRN nicotine cravings  nicotine -  14 mG/24Hr(s) Patch 1 Patch Transdermal daily PRN nicotine cravings  QUEtiapine 50 milliGRAM(s) Oral at bedtime PRN mood   MEDICATIONS  (PRN):  gabapentin 400 milliGRAM(s) Oral four times a day PRN anixety  haloperidol     Tablet 5 milliGRAM(s) Oral every 6 hours PRN agitation  haloperidol    Injectable 5 milliGRAM(s) IntraMuscular Once PRN agitation  nicotine  Polacrilex Gum 4 milliGRAM(s) Oral every 3 hours PRN nicotine cravings  nicotine -  14 mG/24Hr(s) Patch 1 Patch Transdermal daily PRN nicotine cravings  QUEtiapine 50 milliGRAM(s) Oral at bedtime PRN mood  sodium chloride 0.65% Nasal 1 Spray(s) Both Nostrils three times a day PRN Nasal Congestion

## 2022-11-29 NOTE — BH INPATIENT PSYCHIATRY PROGRESS NOTE - NSBHCHARTREVIEWLAB_PSY_A_CORE FT
admission labs reviewed, no acute findings  u-tox +methadone Lithium Level, Serum: 0.7 mmol/L (11.29.22 @ 08:00)   11-29    141  |  103  |  5<L>  ----------------------------<  103<H>  3.6   |  25  |  0.78    Ca    9.5      29 Nov 2022 08:00        admission labs reviewed, no acute findings  u-tox +methadone

## 2022-11-29 NOTE — BH INPATIENT PSYCHIATRY PROGRESS NOTE - NSBHASSESSSUMMFT_PSY_ALL_CORE
28 y/o single woman.  Patient has PPHx of historical diagnoses of Borderline Personality Disorder, schizoaffective disorder, r/o MDD, Bipolar II Disorder, IED. Patient has 2 prior admissions, last hospitalized at [facility] 2016, and [place] 2018. Patient has hx of drug use (Xanax, cocaine, MJ, opiates, hx of inpatient rehab). Patient is BIB father.  Patient is now hospitalized with a primary problem of worsening depression, agitation, aggression, and emotional dysregulation. Patient admitted to [facility] on a 9.39 legal status.     This patient is at elevated acute risk of harm to self and others due to worsening and  escalating behavioral disturbances. She warrants inpatient hospitalization due to recent violent and unpredictable behavior related to acute mental illness, likely to result in harm to self and another person. Patient continues to need inpatient treatment for stabilization and safety.    Continued symptoms of acute psychosis,  paranoid, amenable to continuing current medication regimen.     Plan:  >Legal: 9.39  >Obs: Routine. no need for CO, patient not expected to pose risk to self or others in controlled inpatient setting  >Continue current regimen of psychiatric medications    Standing Psychiatric Medications:  Seroquel (quetiapine) 50mg oral at bedtime  Abilify (Aripiprazole) 30 mg oral at bedtime  Lithium 900 mg oral at bedtime  Gabapentin 400 mg oral 4x/day + PRN for anxiety    Standing Other Medications:  Nicotine Prolacrilax gum 4 mg x 3 hours, PRN for nicotine cravings  Nicotine patch 14 mg/24 hours 1 patch transdermal, daily PRN for cravings    >Labs: Admission labs reviewed, no acute findings. utox +methadone. A1C and lipid labs grossly normal, QTc <500  >Medical:   No acute concerns. No consultations needed at this time. No indication for CIWA. Patient with consistently stable VS, no visible physical symptoms of withdrawal.   During the course of treatment, will collaborate with medical team to manage medical issues.  >Diet: Regular  >Social: milieu/structured therapy  >Treatment Interventions: Groups and Individual Therapy/CBT, Motivational counseling for substance abuse related issues. Consider CASTANEDA  >Dispo: Collateral and dispo planning pending further symptom and medication optimization 26 y/o single woman.  Patient has PPHx of historical diagnoses of Borderline Personality Disorder, schizoaffective disorder, r/o MDD, Bipolar II Disorder, IED. Patient has 2 prior admissions, last hospitalized at [facility] 2016, and [place] 2018. Patient has hx of drug use (Xanax, cocaine, MJ, opiates, hx of inpatient rehab). Patient is BIB father.  Patient is now hospitalized with a primary problem of worsening depression, agitation, aggression, and emotional dysregulation. Patient admitted to [facility] on a 9.39 legal status.     This patient is at elevated acute risk of harm to self and others due to worsening and  escalating behavioral disturbances. She warrants inpatient hospitalization due to recent violent and unpredictable behavior related to acute mental illness, likely to result in harm to self and another person. Patient continues to need inpatient treatment for stabilization and safety.    Continued symptoms of acute psychosis,  paranoid, amenable to continuing current medication regimen.  Li level 0.7, no signs of toxicity.  Will continue on this dose.  BMP WNL     Plan:  >Legal: 9.39  >Obs: Routine. no need for CO, patient not expected to pose risk to self or others in controlled inpatient setting  >Continue current regimen of psychiatric medications    Standing Psychiatric Medications:  Seroquel (quetiapine) 50mg oral at bedtime  Abilify (Aripiprazole) 30 mg oral at bedtime  Lithium 900 mg oral at bedtime  Gabapentin 400 mg oral 4x/day + PRN for anxiety    Standing Other Medications:  Nicotine Prolacrilax gum 4 mg x 3 hours, PRN for nicotine cravings  Nicotine patch 14 mg/24 hours 1 patch transdermal, daily PRN for cravings    >Labs: Admission labs reviewed, no acute findings. utox +methadone. A1C and lipid labs grossly normal, QTc <500  >Medical:   No acute concerns. No consultations needed at this time. No indication for CIWA. Patient with consistently stable VS, no visible physical symptoms of withdrawal.   During the course of treatment, will collaborate with medical team to manage medical issues.  >Diet: Regular  >Social: milieu/structured therapy  >Treatment Interventions: Groups and Individual Therapy/CBT, Motivational counseling for substance abuse related issues. Consider CASTANEDA  >Dispo: Collateral and dispo planning pending further symptom and medication optimization

## 2022-11-29 NOTE — BH INPATIENT PSYCHIATRY PROGRESS NOTE - NSBHCHARTREVIEWVS_PSY_A_CORE FT
Vital Signs Last 24 Hrs  T(C): 36.4 (11-28-22 @ 18:39), Max: 36.4 (11-28-22 @ 18:39)  T(F): 97.6 (11-28-22 @ 18:39), Max: 97.6 (11-28-22 @ 18:39)  HR: --  BP: --  BP(mean): --  RR: --  SpO2: --    Orthostatic VS  11-28-22 @ 19:50  Lying BP: --/-- HR: --  Sitting BP: 144/101 HR: 118  Standing BP: 123/90 HR: 120  Site: --  Mode: --  Orthostatic VS  11-28-22 @ 19:49  Lying BP: --/-- HR: --  Sitting BP: 136/85 HR: 88  Standing BP: 124/79 HR: 97  Site: --  Mode: --  Orthostatic VS  11-28-22 @ 07:04  Lying BP: --/-- HR: --  Sitting BP: 116/77 HR: 100  Standing BP: 102/62 HR: 122  Site: --  Mode: --

## 2022-11-29 NOTE — BH INPATIENT PSYCHIATRY PROGRESS NOTE - OTHER
Unable to engage fully in interview, guarded, evasive, presumed paranoid, cannot tolerate interview well slightly paranoid markedly avoidant, guarded

## 2022-11-29 NOTE — BH INPATIENT PSYCHIATRY PROGRESS NOTE - NSBHFUPINTERVALHXFT_PSY_A_CORE
Patient was seen and is evaluated. She reports having good sleep last night and good appetite today. Patient is compliant with standing medications, took quetiapine last night in order to be able to fall asleep. Patient appeared and expressed anxiety/fear at having four medical staff present during the interview, though stated that she felt safe. We explained to her that there are four of us because this is a teaching service. She did not wish to discuss mental health, responding to our questions about mood/mental health with information about her Covid symptoms. Requested saline due to nose congestion from Covid. Continue to provide therapeutic support as needed and will continue to monitor PO abilify else consider trial of invega if inadequate response. Lithium added to qhs regimen last week. Patient was seen and is evaluated. She reports having good sleep last night and good appetite today. Patient is compliant with standing medications, took quetiapine last night in order to be able to fall asleep. Patient appeared and expressed anxiety/fear at having four medical staff present during the interview, though stated that she felt safe. We explained to her that there are four of us because this is a teaching service. She did not wish to discuss mental health, responding to our questions about mood/mental health with information about her Covid symptoms. Requested saline due to nose congestion from Covid. Continue to provide therapeutic support as needed and will continue to monitor PO abilify else consider trial of invega if inadequate response. Lithium added to qhs regimen last week, level therapeutic today.

## 2022-11-29 NOTE — BH INPATIENT PSYCHIATRY PROGRESS NOTE - CURRENT MEDICATION
MEDICATIONS  (STANDING):  ARIPiprazole 30 milliGRAM(s) Oral at bedtime  lithium CR (ESKALITH-CR) 900 milliGRAM(s) Oral at bedtime    MEDICATIONS  (PRN):  gabapentin 400 milliGRAM(s) Oral four times a day PRN anixety  haloperidol     Tablet 5 milliGRAM(s) Oral every 6 hours PRN agitation  haloperidol    Injectable 5 milliGRAM(s) IntraMuscular Once PRN agitation  nicotine  Polacrilex Gum 4 milliGRAM(s) Oral every 3 hours PRN nicotine cravings  nicotine -  14 mG/24Hr(s) Patch 1 Patch Transdermal daily PRN nicotine cravings  QUEtiapine 50 milliGRAM(s) Oral at bedtime PRN mood   MEDICATIONS  (STANDING):  ARIPiprazole 30 milliGRAM(s) Oral at bedtime  lithium CR (ESKALITH-CR) 900 milliGRAM(s) Oral at bedtime    MEDICATIONS  (PRN):  gabapentin 400 milliGRAM(s) Oral four times a day PRN anixety  haloperidol     Tablet 5 milliGRAM(s) Oral every 6 hours PRN agitation  haloperidol    Injectable 5 milliGRAM(s) IntraMuscular Once PRN agitation  nicotine  Polacrilex Gum 4 milliGRAM(s) Oral every 3 hours PRN nicotine cravings  nicotine -  14 mG/24Hr(s) Patch 1 Patch Transdermal daily PRN nicotine cravings  QUEtiapine 50 milliGRAM(s) Oral at bedtime PRN mood  sodium chloride 0.65% Nasal 1 Spray(s) Both Nostrils three times a day PRN Nasal Congestion

## 2022-11-30 PROCEDURE — 99231 SBSQ HOSP IP/OBS SF/LOW 25: CPT

## 2022-11-30 RX ADMIN — Medication 1 SPRAY(S): at 12:51

## 2022-11-30 RX ADMIN — LITHIUM CARBONATE 900 MILLIGRAM(S): 300 TABLET, EXTENDED RELEASE ORAL at 20:47

## 2022-11-30 RX ADMIN — Medication 4 MILLIGRAM(S): at 11:00

## 2022-11-30 RX ADMIN — GABAPENTIN 400 MILLIGRAM(S): 400 CAPSULE ORAL at 15:12

## 2022-11-30 RX ADMIN — Medication 1 PATCH: at 09:04

## 2022-11-30 RX ADMIN — ARIPIPRAZOLE 30 MILLIGRAM(S): 15 TABLET ORAL at 20:47

## 2022-11-30 RX ADMIN — Medication 4 MILLIGRAM(S): at 17:51

## 2022-11-30 RX ADMIN — Medication 1 SPRAY(S): at 08:10

## 2022-11-30 RX ADMIN — GABAPENTIN 400 MILLIGRAM(S): 400 CAPSULE ORAL at 11:35

## 2022-11-30 RX ADMIN — QUETIAPINE FUMARATE 50 MILLIGRAM(S): 200 TABLET, FILM COATED ORAL at 20:47

## 2022-11-30 RX ADMIN — Medication 1 PATCH: at 08:10

## 2022-11-30 RX ADMIN — GABAPENTIN 400 MILLIGRAM(S): 400 CAPSULE ORAL at 20:47

## 2022-11-30 RX ADMIN — GABAPENTIN 400 MILLIGRAM(S): 400 CAPSULE ORAL at 08:10

## 2022-11-30 NOTE — BH INPATIENT PSYCHIATRY PROGRESS NOTE - PRN MEDS
MEDICATIONS  (PRN):  haloperidol     Tablet 5 milliGRAM(s) Oral every 6 hours PRN agitation  haloperidol    Injectable 5 milliGRAM(s) IntraMuscular Once PRN agitation  nicotine  Polacrilex Gum 4 milliGRAM(s) Oral every 3 hours PRN nicotine cravings  QUEtiapine 50 milliGRAM(s) Oral at bedtime PRN mood  sodium chloride 0.65% Nasal 1 Spray(s) Both Nostrils three times a day PRN Nasal Congestion

## 2022-11-30 NOTE — BH INPATIENT PSYCHIATRY PROGRESS NOTE - CURRENT MEDICATION
MEDICATIONS  (STANDING):  ARIPiprazole 30 milliGRAM(s) Oral at bedtime  gabapentin 400 milliGRAM(s) Oral <User Schedule>  lithium CR (ESKALITH-CR) 900 milliGRAM(s) Oral at bedtime  nicotine - 21 mG/24Hr(s) Patch 1 Patch Transdermal daily    MEDICATIONS  (PRN):  haloperidol     Tablet 5 milliGRAM(s) Oral every 6 hours PRN agitation  haloperidol    Injectable 5 milliGRAM(s) IntraMuscular Once PRN agitation  nicotine  Polacrilex Gum 4 milliGRAM(s) Oral every 3 hours PRN nicotine cravings  QUEtiapine 50 milliGRAM(s) Oral at bedtime PRN mood  sodium chloride 0.65% Nasal 1 Spray(s) Both Nostrils three times a day PRN Nasal Congestion

## 2022-11-30 NOTE — BH INPATIENT PSYCHIATRY PROGRESS NOTE - NSBHASSESSSUMMFT_PSY_ALL_CORE
28 y/o single woman.  Patient has PPHx of historical diagnoses of Borderline Personality Disorder, schizoaffective disorder, r/o MDD, Bipolar II Disorder, IED. Patient has 2 prior admissions, last hospitalized at [facility] 2016, and [place] 2018. Patient has hx of drug use (Xanax, cocaine, MJ, opiates, hx of inpatient rehab). Patient is BIB father.  Patient is now hospitalized with a primary problem of worsening depression, agitation, aggression, and emotional dysregulation. Patient admitted to [facility] on a 9.39 legal status.     This patient is no longer at acute risk of harm to self and others, thus does not longer need complete inpatient treatment for stabilization and safety. However, would benefit from partial hospitalization due to continued potential risk of harm to self.    Amenable to continuing current medication regimen.  Li level 0.7, no signs of toxicity.  Will continue on this dose.  BMP WNL     Plan:  >Legal: 9.39  >Obs: Routine. no need for CO, patient not expected to pose risk to self or others in controlled inpatient setting  >Continue current regimen of psychiatric medications    Standing Psychiatric Medications:  Seroquel (quetiapine) 50mg oral at bedtime  Abilify (Aripiprazole) 30 mg oral at bedtime  Lithium 900 mg oral at bedtime  Gabapentin 400 mg oral 4x/day + PRN for anxiety    Standing Other Medications:  Nicotine Prolacrilax gum 4 mg x 3 hours, PRN for nicotine cravings  Nicotine patch 14 mg/24 hours 1 patch transdermal, daily PRN for cravings    >Labs: Admission labs reviewed, no acute findings. utox +methadone. A1C and lipid labs grossly normal, QTc <500  >Medical:   No acute concerns. No consultations needed at this time. No indication for CIWA. Patient with consistently stable VS, no visible physical symptoms of withdrawal.   During the course of treatment, will collaborate with medical team to manage medical issues.  >Diet: Regular  >Social: milieu/structured therapy  >Treatment Interventions: Groups and Individual Therapy/CBT, Motivational counseling for substance abuse related issues. Consider CASTANEDA  >Dispo: Collateral and dispo planning pending further symptom and medication optimization 26 y/o single woman.  Patient has PPHx of historical diagnoses of Borderline Personality Disorder, schizoaffective disorder, r/o MDD, Bipolar II Disorder, IED. Patient has 2 prior admissions, last hospitalized at [facility] 2016, and [place] 2018. Patient has hx of drug use (Xanax, cocaine, MJ, opiates, hx of inpatient rehab). Patient is BIB father.  Patient is now hospitalized with a primary problem of worsening depression, agitation, aggression, and emotional dysregulation. Patient admitted to [facility] on a 9.39 legal status.     This patient is no longer at acute risk of harm to self and others, thus does not longer need complete inpatient treatment for stabilization and safety. However, would benefit from partial hospitalization due to continued potential risk of harm to self.    Amenable to continuing current medication regimen.  Li level 0.7  11/29, no signs of toxicity.  Will continue on this dose.       Plan:  >Legal: 9.39  >Obs: Routine. no need for CO, patient not expected to pose risk to self or others in controlled inpatient setting  >Continue current regimen of psychiatric medications    Standing Psychiatric Medications:  Seroquel (quetiapine) 50mg oral at bedtime  Abilify (Aripiprazole) 30 mg oral at bedtime  Lithium 900 mg oral at bedtime  Gabapentin 400 mg oral 4x/day + PRN for anxiety    Standing Other Medications:  Nicotine Prolacrilax gum 4 mg x 3 hours, PRN for nicotine cravings  Nicotine patch 14 mg/24 hours 1 patch transdermal, daily PRN for cravings    >Labs: Admission labs reviewed, no acute findings. utox +methadone. A1C and lipid labs grossly normal, QTc <500  >Medical:   No acute concerns. No consultations needed at this time. No indication for CIWA. Patient with consistently stable VS, no visible physical symptoms of withdrawal.   During the course of treatment, will collaborate with medical team to manage medical issues.  >Diet: Regular  >Social: milieu/structured therapy  >Treatment Interventions: Groups and Individual Therapy/CBT, Motivational counseling for substance abuse related issues. Consider CASTANEDA  >Dispo: Collateral and dispo planning pending further symptom and medication optimization

## 2022-11-30 NOTE — BH INPATIENT PSYCHIATRY PROGRESS NOTE - NSBHCHARTREVIEWVS_PSY_A_CORE FT
Vital Signs Last 24 Hrs  T(C): 36.4 (11-30-22 @ 08:43), Max: 36.4 (11-30-22 @ 08:43)  T(F): 97.5 (11-30-22 @ 08:43), Max: 97.5 (11-30-22 @ 08:43)  HR: --  BP: --  BP(mean): --  RR: --  SpO2: --    Orthostatic VS  11-30-22 @ 08:43  Lying BP: --/-- HR: --  Sitting BP: 116/70 HR: 95  Standing BP: 104/80 HR: 100  Site: --  Mode: --  Orthostatic VS  11-28-22 @ 19:50  Lying BP: --/-- HR: --  Sitting BP: 144/101 HR: 118  Standing BP: 123/90 HR: 120  Site: --  Mode: --  Orthostatic VS  11-28-22 @ 19:49  Lying BP: --/-- HR: --  Sitting BP: 136/85 HR: 88  Standing BP: 124/79 HR: 97  Site: --  Mode: --

## 2022-11-30 NOTE — BH INPATIENT PSYCHIATRY PROGRESS NOTE - NSBHCHARTREVIEWLAB_PSY_A_CORE FT
Lithium Level, Serum: 0.7 mmol/L (11.29.22 @ 08:00)   11-29    141  |  103  |  5<L>  ----------------------------<  103<H>  3.6   |  25  |  0.78    Ca    9.5      29 Nov 2022 08:00        admission labs reviewed, no acute findings  u-tox +methadone

## 2022-11-30 NOTE — BH INPATIENT PSYCHIATRY PROGRESS NOTE - NSBHFUPINTERVALHXFT_PSY_A_CORE
Patient was seen and is evaluated. Patient has been in communication with her father on the phone about her hospitalization. She reports having diarrhea, and asked for constipating medications. She has been holding in her need to poop and looked visibly uncomfortable, so we advised her to release it and then return to the interview after that. She continued to respond to our questions about mood/mental health with information about her Covid symptoms. On further pressing, she acknowledged that she is "feeling better" and is "more polite" now. Continue to provide therapeutic support as needed and will continue to monitor PO abilify else consider trial of invega if inadequate response. Lithium added to qhs regimen last week, level therapeutic yesterday at 0.7. Patient amenable to going to partial hospitalization. Patient was seen and is evaluated. Patient has been in communication with her father on the phone about her hospitalization. She reports having diarrhea, and asked for constipating medications. She has been holding in her need to move her bowels and looked visibly uncomfortable, so we advised her to release it and then return to the interview after that. She continued to respond to our questions about mood/mental health with information about her Covid symptoms. On further pressing, she acknowledged that she is "feeling better" and is "more polite" now. Continue to provide therapeutic support as needed and will continue to monitor PO abilify else consider trial of invega if inadequate response. Lithium added to qhs regimen last week, level therapeutic yesterday at 0.7. Patient amenable to going to partial hospitalization.

## 2022-12-01 PROCEDURE — 99231 SBSQ HOSP IP/OBS SF/LOW 25: CPT | Mod: GC

## 2022-12-01 RX ADMIN — GABAPENTIN 400 MILLIGRAM(S): 400 CAPSULE ORAL at 15:51

## 2022-12-01 RX ADMIN — Medication 4 MILLIGRAM(S): at 15:51

## 2022-12-01 RX ADMIN — Medication 1 PATCH: at 08:00

## 2022-12-01 RX ADMIN — Medication 1 SPRAY(S): at 08:03

## 2022-12-01 RX ADMIN — LITHIUM CARBONATE 900 MILLIGRAM(S): 300 TABLET, EXTENDED RELEASE ORAL at 20:26

## 2022-12-01 RX ADMIN — QUETIAPINE FUMARATE 50 MILLIGRAM(S): 200 TABLET, FILM COATED ORAL at 20:26

## 2022-12-01 RX ADMIN — GABAPENTIN 400 MILLIGRAM(S): 400 CAPSULE ORAL at 08:02

## 2022-12-01 RX ADMIN — GABAPENTIN 400 MILLIGRAM(S): 400 CAPSULE ORAL at 20:26

## 2022-12-01 RX ADMIN — Medication 1 SPRAY(S): at 21:48

## 2022-12-01 RX ADMIN — ARIPIPRAZOLE 30 MILLIGRAM(S): 15 TABLET ORAL at 20:26

## 2022-12-01 RX ADMIN — Medication 4 MILLIGRAM(S): at 06:11

## 2022-12-01 RX ADMIN — Medication 4 MILLIGRAM(S): at 21:49

## 2022-12-01 RX ADMIN — Medication 1 PATCH: at 08:02

## 2022-12-01 RX ADMIN — GABAPENTIN 400 MILLIGRAM(S): 400 CAPSULE ORAL at 11:46

## 2022-12-01 NOTE — BH INPATIENT PSYCHIATRY PROGRESS NOTE - NSBHMSEREMMEM_PSY_A_CORE
Unable to assess

## 2022-12-01 NOTE — BH INPATIENT PSYCHIATRY PROGRESS NOTE - NSBHMSEJUDGE_PSY_A_CORE
Fair
Unable to assess
Fair
Fair

## 2022-12-01 NOTE — BH TREATMENT PLAN - NSTXCOPEINTERRN_PSY_ALL_CORE
Assess mental status, mood and behavioral patterns. Redirect/reorient as needed. De-escalate verbally or medically as needed.  Encourage pt to verbalize thoughts and feelings.
Continue psychoeducation

## 2022-12-01 NOTE — BH INPATIENT PSYCHIATRY PROGRESS NOTE - NSBHMSEATTEN_PSY_A_CORE
Impaired
Normal
Impaired
Normal
Normal

## 2022-12-01 NOTE — BH INPATIENT PSYCHIATRY PROGRESS NOTE - NSBHMSEIMPULSE_PSY_A_CORE
Normal
Unable to assess
Unable to assess
Normal
Unable to assess

## 2022-12-01 NOTE — BH INPATIENT PSYCHIATRY PROGRESS NOTE - NSBHASSESSSUMMFT_PSY_ALL_CORE
26 y/o single woman.  Patient has PPHx of historical diagnoses of Borderline Personality Disorder, schizoaffective disorder, r/o MDD, Bipolar II Disorder, IED. Patient has 2 prior admissions, last hospitalized at [facility] 2016, and [place] 2018. Patient has hx of drug use (Xanax, cocaine, MJ, opiates, hx of inpatient rehab). Patient is BIB father.  Patient is now hospitalized with a primary problem of worsening depression, agitation, aggression, and emotional dysregulation. Patient admitted to [facility] on a 9.39 legal status.     This patient is no longer at acute risk of harm to self and others, thus does not longer need complete inpatient treatment for stabilization and safety. However, would benefit from partial hospitalization due to continued potential risk of harm to self.    Amenable to continuing current medication regimen.  Li level 0.7  11/29, no signs of toxicity.  Will continue on this dose.       Plan:  >Legal: 9.39  >Obs: Routine. no need for CO, patient not expected to pose risk to self or others in controlled inpatient setting  >Continue current regimen of psychiatric medications    Standing Psychiatric Medications:  Seroquel (quetiapine) 50mg oral at bedtime  Abilify (Aripiprazole) 30 mg oral at bedtime  Lithium 900 mg oral at bedtime  Gabapentin 400 mg oral 4x/day + PRN for anxiety    Standing Other Medications:  Nicotine Prolacrilax gum 4 mg x 3 hours, PRN for nicotine cravings  Nicotine patch 14 mg/24 hours 1 patch transdermal, daily PRN for cravings    >Labs: Admission labs reviewed, no acute findings. utox +methadone. A1C and lipid labs grossly normal, QTc <500  >Medical:   No acute concerns. No consultations needed at this time. No indication for CIWA. Patient with consistently stable VS, no visible physical symptoms of withdrawal.   During the course of treatment, will collaborate with medical team to manage medical issues.  >Diet: Regular  >Social: milieu/structured therapy  >Treatment Interventions: Groups and Individual Therapy/CBT, Motivational counseling for substance abuse related issues. Consider CASTANEDA  >Dispo: Collateral and dispo planning pending further symptom and medication optimization

## 2022-12-01 NOTE — BH TREATMENT PLAN - NSCMSPTSTRENGTHS_PSY_ALL_CORE
Assertive/Financial stability/Leisure interest/Physically healthy/Resourceful/Supportive family
Assertive/Financial stability/Leisure interest/Physically healthy/Resourceful/Supportive family
Positive attitude/Supportive family

## 2022-12-01 NOTE — BH TREATMENT PLAN - NSTXDISORGINTERRN_PSY_ALL_CORE
Assess mental status, mood and behavioral patterns. Redirect/reorient as needed. Encourage pt to verbalize thoughts and feelings.
Psychoeducation/Medication managemnet

## 2022-12-01 NOTE — BH INPATIENT PSYCHIATRY PROGRESS NOTE - NSBHMSEPERCEPT_PSY_A_CORE
No abnormalities
Unable to assess
No abnormalities
Unable to assess
No abnormalities
Unable to assess

## 2022-12-01 NOTE — BH TREATMENT PLAN - NSTXDEPRESINTERRN_PSY_ALL_CORE
Continue psychoeducation/Medication management
Assess mental status, mood and behavioral patterns. Redirect/reorient as needed. De-escalate verbally or medically as needed.  Encourage pt to verbalize thoughts and feelings.

## 2022-12-01 NOTE — BH TREATMENT PLAN - NSTXPSYCHOGOAL_PSY_ALL_CORE
Make at least 5 reality based statements/requests to staff and/or peers
Will identify 2 coping skills that assist with focus on reality
Make at least 5 reality based statements/requests to staff and/or peers

## 2022-12-01 NOTE — BH TREATMENT PLAN - NSTXCAREGIVERPARTICIPATE_PSY_P_CORE
Family/Caregiver participated in identification of needs/problems/goals for treatment/Family/Caregiver participated in defining interventions
Family/Caregiver participated in identification of needs/problems/goals for treatment/Family/Caregiver participated in defining interventions
Family/Caregiver participated in identification of needs/problems/goals for treatment

## 2022-12-01 NOTE — BH TREATMENT PLAN - NSTXDISORGGOAL_PSY_ALL_CORE
Will identify 2 coping skills that assist in organizing
Will make at least 3 goal and reality oriented statements during therapy
Will make at least 3 goal and reality oriented statements during therapy

## 2022-12-01 NOTE — BH INPATIENT PSYCHIATRY PROGRESS NOTE - NSBHFUPINTERVALHXFT_PSY_A_CORE
Patient was seen and is evaluated. She denies diarrhea and stomach pain. She stated that she was feeling well and that her mood was "good." Stated that she was mostly eating packaged hospital foods because she is concerned about hospital germs going into the food. At home she also mainly eats packaged foods due to concerns about germs/hygiene. Patient called father yesterday but he was busy. Says that it took her some time to settle into the unit, but has now been social and communicating with other patients on the unit. Continue to provide therapeutic support as needed and will continue to monitor PO abilify else consider trial of invega if inadequate response. Lithium added to qhs regimen last week, level therapeutic yesterday at 0.7. Patient amenable to going to partial hospitalization. Patient was seen and is evaluated. She denies diarrhea and stomach pain. She stated that she was feeling well and that her mood was "good." Stated that she was mostly eating packaged hospital foods because she is concerned about hospital germs going into the food. At home she also mainly eats packaged foods due to concerns about germs/hygiene. Patient called father yesterday but he was busy. Says that it took her some time to settle into the unit, but has now been social and communicating with other patients on the unit. Patient amenable to going to partial hospitalization.

## 2022-12-01 NOTE — BH INPATIENT PSYCHIATRY PROGRESS NOTE - NSBHMSETHTPROC_PSY_A_CORE
Linear/Circumstantial/Illogical/Other
Other
Linear/Circumstantial/Illogical/Other
Other
Linear/Other
Linear/Circumstantial/Illogical/Other

## 2022-12-01 NOTE — BH TREATMENT PLAN - NSTXMEDICINTERRN_PSY_ALL_CORE
Educated and encouraged pt to comply with medication regimen goals. Provided medication teaching, monitored side effects and response. Encouraged to attend medication groups.
Medication education/Reinforce  compliance

## 2022-12-01 NOTE — BH TREATMENT PLAN - NSTXDCOTHRINTERSW_PSY_ALL_CORE
SW will continue to provide support, education and ongoing discussion of dc plans
SW will continue to provide support, education and ongoing discussion of dc plans

## 2022-12-01 NOTE — BH TREATMENT PLAN - NSTXANXGOAL_PSY_ALL_CORE
Be able to participate in activities despite lingering anxiety/panic

## 2022-12-01 NOTE — BH TREATMENT PLAN - NSTXDEPRESGOAL_PSY_ALL_CORE
Attend and participate in at least 2 groups daily despite low mood/energy
Attend and participate in at least 2 groups daily despite low mood/energy
Will identify 2 coping skills that assist in improving mood

## 2022-12-01 NOTE — BH INPATIENT PSYCHIATRY PROGRESS NOTE - NSBHMSEBEHAV_PSY_A_CORE
Cooperative/Uncooperative
Uncooperative/Other
Uncooperative/Other
Cooperative/Other
Cooperative/Uncooperative/Other
Uncooperative/Other
Cooperative/Uncooperative/Other
Uncooperative/Other
Cooperative
Uncooperative/Other
Cooperative/Other
Uncooperative
Cooperative

## 2022-12-01 NOTE — BH INPATIENT PSYCHIATRY PROGRESS NOTE - NSBHMSEKNOWHOW_PSY_ALL_CORE
Educational attainment/Vocabulary/Other...
Current Events
Educational attainment/Vocabulary/Other...
Educational attainment/Vocabulary/Other...
Current Events
Current Events

## 2022-12-01 NOTE — BH TREATMENT PLAN - NSTXVIOLNTGOAL_PSY_ALL_CORE
Will identify thoughts/feelings/behaviors prior to loss of control
Will identify thoughts/feelings/behaviors prior to loss of control
Will identify 2 situations that cause an aggressive response

## 2022-12-01 NOTE — BH TREATMENT PLAN - NSTXCONDUCINTERMD_PSY_ALL_CORE
Psychopharm with goal of mood stabilization+CASTANEDA and supportive therapy with appropriate aftercare/possible AOPD CASTANEDA clinic referral
Psychopharm with goal of mood stabilization+CASTANEDA and supportive therapy with appropriate aftercare/possible AOPD CASTANEDA clinic referral

## 2022-12-01 NOTE — BH INPATIENT PSYCHIATRY PROGRESS NOTE - NSBHMSETHTCONTENT_PSY_A_CORE
Unremarkable/Other
Preoccupations
Other/Unable to assess
Delusions/Other
Unremarkable/Other
Preoccupations
Delusions/Other
Delusions/Other
Unremarkable/Other
Unremarkable/Other
Delusions/Other

## 2022-12-01 NOTE — BH INPATIENT PSYCHIATRY PROGRESS NOTE - NSBHCHARTREVIEWVS_PSY_A_CORE FT
Vital Signs Last 24 Hrs  T(C): 36.2 (12-01-22 @ 06:17), Max: 36.2 (12-01-22 @ 06:17)  T(F): 97.2 (12-01-22 @ 06:17), Max: 97.2 (12-01-22 @ 06:17)  HR: --  BP: --  BP(mean): --  RR: --  SpO2: --    Orthostatic VS  12-01-22 @ 06:17  Lying BP: --/-- HR: --  Sitting BP: 118/78 HR: 96  Standing BP: 110/62 HR: 99  Site: --  Mode: --  Orthostatic VS  11-30-22 @ 18:35  Lying BP: --/-- HR: --  Sitting BP: 134/88 HR: 123  Standing BP: 142/88 HR: 134  Site: --  Mode: --  Orthostatic VS  11-30-22 @ 08:43  Lying BP: --/-- HR: --  Sitting BP: 116/70 HR: 95  Standing BP: 104/80 HR: 100  Site: --  Mode: --

## 2022-12-01 NOTE — BH TREATMENT PLAN - NSTXNEGATINTERMD_PSY_ALL_CORE
Psychopharm with goal of mood stabilization+CASTANEDA and supportive therapy with appropriate aftercare/possible AOPD CASTANEDA clinic referral

## 2022-12-01 NOTE — BH TREATMENT PLAN - NSDCCRITERIA_PSY_ALL_CORE
Symptom stabilization  CGI<=2  CASTANEDA

## 2022-12-01 NOTE — BH INPATIENT PSYCHIATRY PROGRESS NOTE - NSBHMSEINTELL_PSY_A_CORE
Below Average
ZEKE 2north
Below Average
Below Average
Unable to assess
Below Average
Unable to assess
Below Average
Unable to assess

## 2022-12-01 NOTE — BH TREATMENT PLAN - NSTXDCOTHRGOAL_PSY_ALL_CORE
Pt will adhere to medication regimen and display behavioral control x7 days.

## 2022-12-01 NOTE — BH INPATIENT PSYCHIATRY PROGRESS NOTE - NSBHMSEAFFQUAL_PSY_A_CORE
Depressed/Irritable/Anxious
Depressed/Irritable/Anxious
Irritable/Anxious
Depressed/Irritable/Anxious
Anxious
Depressed/Irritable/Anxious
Depressed/Irritable/Anxious
Anxious

## 2022-12-01 NOTE — BH TREATMENT PLAN - NSPTSTATEDGOAL_PSY_ALL_CORE
To get help with my medication and anger mgmt....
"to feel better"
To get help with my medication and anger mgmt....

## 2022-12-01 NOTE — BH TREATMENT PLAN - NSTXCOPEINTERPR_PSY_ALL_CORE
Patient was able to identify a coping method for improved symptom management; she has been observed engaged in the same over the last 7 days. Patient has reportedly demonstrated daily medication/treatment compliance, she has attended fairly to self-care/ADL, and maintained visibility on the unit.   Patient reported ambivalence about the efficacy of psychiatric treatment thus far and she was unable tolerate full progress assessment or discuss goal progress development/enhancement during individual session with writer due to apparent internal preoccupation and restlessness. Patient denied distress upon writer’s inquiry.  Patient has been able to make some needs known without aggression/physical violence, violating the boundaries of others, or breaking unit rules. However, patient often demonstrates attention-seeking behaviors and shortness with staff. Patient has been minimally social with select peers, she has attended some daily psychiatric rehabilitation groups over the last 7 days, and she has been intermittently observed engaged in constructive/valued activity without prompting from staff.   Over the next 7 days, psychiatric rehabilitation staff will continue to provide supportive counseling and motivational interviewing as well as daily group therapy to assist patient in identifying/utilizing 2 effective methods to meet her needs and demonstrating daily medication/treatment compliance for improved symptom management and insight.

## 2022-12-01 NOTE — BH TREATMENT PLAN - NSTXNEUROGOAL_PSY_ALL_CORE
Will tolerate 15 minutes of group without agitation

## 2022-12-02 PROCEDURE — 99231 SBSQ HOSP IP/OBS SF/LOW 25: CPT

## 2022-12-02 RX ORDER — ARIPIPRAZOLE 15 MG/1
675 TABLET ORAL ONCE
Refills: 0 | Status: COMPLETED | OUTPATIENT
Start: 2022-12-02 | End: 2022-12-02

## 2022-12-02 RX ADMIN — Medication 4 MILLIGRAM(S): at 18:12

## 2022-12-02 RX ADMIN — Medication 1 PATCH: at 08:12

## 2022-12-02 RX ADMIN — ARIPIPRAZOLE 675 MILLIGRAM(S): 15 TABLET ORAL at 18:12

## 2022-12-02 RX ADMIN — ARIPIPRAZOLE 30 MILLIGRAM(S): 15 TABLET ORAL at 20:24

## 2022-12-02 RX ADMIN — GABAPENTIN 400 MILLIGRAM(S): 400 CAPSULE ORAL at 12:54

## 2022-12-02 RX ADMIN — LITHIUM CARBONATE 900 MILLIGRAM(S): 300 TABLET, EXTENDED RELEASE ORAL at 20:24

## 2022-12-02 RX ADMIN — Medication 4 MILLIGRAM(S): at 08:12

## 2022-12-02 RX ADMIN — GABAPENTIN 400 MILLIGRAM(S): 400 CAPSULE ORAL at 17:11

## 2022-12-02 RX ADMIN — QUETIAPINE FUMARATE 50 MILLIGRAM(S): 200 TABLET, FILM COATED ORAL at 20:24

## 2022-12-02 RX ADMIN — Medication 4 MILLIGRAM(S): at 22:00

## 2022-12-02 RX ADMIN — GABAPENTIN 400 MILLIGRAM(S): 400 CAPSULE ORAL at 20:24

## 2022-12-02 RX ADMIN — GABAPENTIN 400 MILLIGRAM(S): 400 CAPSULE ORAL at 08:12

## 2022-12-02 RX ADMIN — Medication 4 MILLIGRAM(S): at 11:50

## 2022-12-02 NOTE — BH INPATIENT PSYCHIATRY PROGRESS NOTE - NSBHFUPINTERVALCCFT_PSY_A_CORE
Does NOT APPEAR VISUALLY SIGNIFICANT. F/u for depression and anxiety+thought disorder  r/o BPD vs BAD or SAD vs both

## 2022-12-02 NOTE — BH INPATIENT PSYCHIATRY PROGRESS NOTE - MSE UNSTRUCTURED FT
Level of consciousness: alert and oriented to person, place, and time  Appearance: well groomed, well dressed, appears stated age, good eye contact  Speech: normal rate, normal rhythm, normal volume, talkative, slightly pressured  Motor: normal gait, no tics or other abnormal movements  Mood: "very good"  Affect: euthymic, appropriate, slightly restricted, stable  Thought process: slightly illogical (doesn't want to have children, but thinks that working with children might make her want to have them)  Thought content: no delusions, no hallucinations, no suicidal ideation, no homicidal ideation  Attitude: congenial  Insight: poor Level of consciousness: alert and oriented to person, place, and time  Appearance: well groomed, well dressed, appears stated age, good eye contact  Speech: normal rate, normal rhythm, normal volume, talkative, slightly pressured  Motor: normal gait, no tics or other abnormal movements  Mood: "very good"  Affect: anxious, appropriate, constricted, stable  Thought process: slightly illogical (doesn't want to have children, but thinks that working with children might make her want to have them)  Thought content: no delusions, no hallucinations, no suicidal ideation, no homicidal ideation  Attitude: congenial  Insight: poor  Cognition: oriented in all spheres

## 2022-12-02 NOTE — BH INPATIENT PSYCHIATRY PROGRESS NOTE - NSBHCHARTREVIEWVS_PSY_A_CORE FT
Vital Signs Last 24 Hrs  T(C): 36.6 (12-02-22 @ 08:07), Max: 36.7 (12-01-22 @ 17:18)  T(F): 97.9 (12-02-22 @ 08:07), Max: 98.1 (12-01-22 @ 17:18)  HR: --  BP: --  BP(mean): --  RR: --  SpO2: --    Orthostatic VS  12-02-22 @ 08:07  Lying BP: --/-- HR: --  Sitting BP: 112/72 HR: 85  Standing BP: 104/79 HR: 109  Site: --  Mode: --  Orthostatic VS  12-01-22 @ 19:23  Lying BP: --/-- HR: --  Sitting BP: 123/78 HR: 99  Standing BP: 109/90 HR: 124  Site: --  Mode: --  Orthostatic VS  12-01-22 @ 06:17  Lying BP: --/-- HR: --  Sitting BP: 118/78 HR: 96  Standing BP: 110/62 HR: 99  Site: --  Mode: --  Orthostatic VS  11-30-22 @ 18:35  Lying BP: --/-- HR: --  Sitting BP: 134/88 HR: 123  Standing BP: 142/88 HR: 134  Site: --  Mode: --   Vital Signs Last 24 Hrs  T(C): 36.6 (12-02-22 @ 08:07), Max: 36.6 (12-02-22 @ 08:07)  T(F): 97.9 (12-02-22 @ 08:07), Max: 97.9 (12-02-22 @ 08:07)  HR: --  BP: --  BP(mean): --  RR: --  SpO2: --    Orthostatic VS  12-02-22 @ 08:07  Lying BP: --/-- HR: --  Sitting BP: 112/72 HR: 85  Standing BP: 104/79 HR: 109  Site: --  Mode: --  Orthostatic VS  12-01-22 @ 19:23  Lying BP: --/-- HR: --  Sitting BP: 123/78 HR: 99  Standing BP: 109/90 HR: 124  Site: --  Mode: --  Orthostatic VS  12-01-22 @ 06:17  Lying BP: --/-- HR: --  Sitting BP: 118/78 HR: 96  Standing BP: 110/62 HR: 99  Site: --  Mode: --  Orthostatic VS  11-30-22 @ 18:35  Lying BP: --/-- HR: --  Sitting BP: 134/88 HR: 123  Standing BP: 142/88 HR: 134  Site: --  Mode: --

## 2022-12-02 NOTE — BH INPATIENT PSYCHIATRY PROGRESS NOTE - NSBHFUPINTERVALHXFT_PSY_A_CORE
Patient was seen and is evaluated. She denies diarrhea and stomach pain. 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 She stated that she doesn't want to work with children because she doesn't want to have her own children in the future, and working with them might make her want to have her own children. Patient continues to be amenable to going to partial hospitalization and is tolerating medications well. Denies delusions, hallucinations, suicidal thoughts/plan.

## 2022-12-02 NOTE — BH INPATIENT PSYCHIATRY PROGRESS NOTE - NSBHASSESSSUMMFT_PSY_ALL_CORE
28 y/o single woman.  Patient has PPHx of historical diagnoses of Borderline Personality Disorder, schizoaffective disorder, r/o MDD, Bipolar II Disorder, IED. Patient has 2 prior admissions, last hospitalized at [facility] 2016, and [place] 2018. Patient has hx of drug use (Xanax, cocaine, MJ, opiates, hx of inpatient rehab). Patient is BIB father.  Patient is now hospitalized with a primary problem of worsening depression, agitation, aggression, and emotional dysregulation. Patient admitted to [facility] on a 9.39 legal status.     This patient is no longer at acute risk of harm to self and others, thus does not longer need complete inpatient treatment for stabilization and safety. However, would benefit from partial hospitalization due to continued potential risk of harm to self.    Amenable to continuing current medication regimen.  Li level 0.7  11/29, no signs of toxicity.  Will continue on this dose.       Plan:  >Legal: 9.39  >Obs: Routine. no need for CO, patient not expected to pose risk to self or others in controlled inpatient setting  >Continue current regimen of psychiatric medications    Standing Psychiatric Medications:  Seroquel (quetiapine) 50mg oral at bedtime  Abilify (Aripiprazole) 30 mg oral at bedtime  Lithium 900 mg oral at bedtime  Gabapentin 400 mg oral 4x/day + PRN for anxiety    Standing Other Medications:  Nicotine Prolacrilax gum 4 mg x 3 hours, PRN for nicotine cravings  Nicotine patch 14 mg/24 hours 1 patch transdermal, daily PRN for cravings    >Labs: Admission labs reviewed, no acute findings. utox +methadone. A1C and lipid labs grossly normal, QTc <500  >Medical:   No acute concerns. No consultations needed at this time. No indication for CIWA. Patient with consistently stable VS, no visible physical symptoms of withdrawal.   During the course of treatment, will collaborate with medical team to manage medical issues.  >Diet: Regular  >Social: milieu/structured therapy  >Treatment Interventions: Groups and Individual Therapy/CBT, Motivational counseling for substance abuse related issues. Consider CASTANEDA  >Dispo: Collateral and dispo planning pending further symptom and medication optimization 26 y/o single woman.  Patient has PPHx of historical diagnoses of Borderline Personality Disorder, schizoaffective disorder, r/o MDD, Bipolar II Disorder, IED. Patient has 2 prior admissions, last hospitalized at [facility] 2016, and [place] 2018. Patient has hx of drug use (Xanax, cocaine, MJ, opiates, hx of inpatient rehab). Patient is BIB father.  Patient is now hospitalized with a primary problem of worsening depression, agitation, aggression, and emotional dysregulation. Patient admitted to [facility] on a 9.39 legal status.     This patient is no longer at acute risk of harm to self and others, thus does not longer need complete inpatient treatment for stabilization and safety. However, would benefit from partial hospitalization due to continued potential risk of harm to self.    Amenable to continuing current medication regimen.  Li level 0.7  11/29, no signs of toxicity.  Will continue on this dose.  CASTANEDA Aristada initio today.     Plan:  >Legal: 9.39  >Obs: Routine. no need for CO, patient not expected to pose risk to self or others in controlled inpatient setting  >Continue current regimen of psychiatric medications    Standing Psychiatric Medications:  Seroquel (quetiapine) 50mg oral at bedtime  Abilify (Aripiprazole) 30 mg oral at bedtime  Lithium 900 mg oral at bedtime  Gabapentin 400 mg oral 4x/day + PRN for anxiety    Standing Other Medications:  Nicotine Prolacrilax gum 4 mg x 3 hours, PRN for nicotine cravings  Nicotine patch 14 mg/24 hours 1 patch transdermal, daily PRN for cravings    >Labs: Admission labs reviewed, no acute findings. utox +methadone. A1C and lipid labs grossly normal, QTc <500  >Medical:   No acute concerns. No consultations needed at this time. No indication for CIWA. Patient with consistently stable VS, no visible physical symptoms of withdrawal.   During the course of treatment, will collaborate with medical team to manage medical issues.  >Diet: Regular  >Social: milieu/structured therapy  >Treatment Interventions: Groups and Individual Therapy/CBT, Motivational counseling for substance abuse related issues. Consider CASTANEDA  >Dispo: Collateral and dispo planning pending further symptom and medication optimization

## 2022-12-03 RX ADMIN — Medication 4 MILLIGRAM(S): at 17:01

## 2022-12-03 RX ADMIN — LITHIUM CARBONATE 900 MILLIGRAM(S): 300 TABLET, EXTENDED RELEASE ORAL at 20:25

## 2022-12-03 RX ADMIN — GABAPENTIN 400 MILLIGRAM(S): 400 CAPSULE ORAL at 20:25

## 2022-12-03 RX ADMIN — GABAPENTIN 400 MILLIGRAM(S): 400 CAPSULE ORAL at 12:40

## 2022-12-03 RX ADMIN — GABAPENTIN 400 MILLIGRAM(S): 400 CAPSULE ORAL at 15:44

## 2022-12-03 RX ADMIN — Medication 4 MILLIGRAM(S): at 09:25

## 2022-12-03 RX ADMIN — Medication 1 SPRAY(S): at 11:19

## 2022-12-03 RX ADMIN — Medication 1 PATCH: at 09:14

## 2022-12-03 RX ADMIN — Medication 1 PATCH: at 08:32

## 2022-12-03 RX ADMIN — ARIPIPRAZOLE 30 MILLIGRAM(S): 15 TABLET ORAL at 20:25

## 2022-12-03 RX ADMIN — Medication 4 MILLIGRAM(S): at 20:54

## 2022-12-03 RX ADMIN — GABAPENTIN 400 MILLIGRAM(S): 400 CAPSULE ORAL at 08:32

## 2022-12-04 LAB — SARS-COV-2 RNA SPEC QL NAA+PROBE: SIGNIFICANT CHANGE UP

## 2022-12-04 RX ADMIN — GABAPENTIN 400 MILLIGRAM(S): 400 CAPSULE ORAL at 20:03

## 2022-12-04 RX ADMIN — ARIPIPRAZOLE 30 MILLIGRAM(S): 15 TABLET ORAL at 20:02

## 2022-12-04 RX ADMIN — Medication 1 SPRAY(S): at 07:39

## 2022-12-04 RX ADMIN — Medication 4 MILLIGRAM(S): at 19:55

## 2022-12-04 RX ADMIN — Medication 4 MILLIGRAM(S): at 05:57

## 2022-12-04 RX ADMIN — GABAPENTIN 400 MILLIGRAM(S): 400 CAPSULE ORAL at 17:10

## 2022-12-04 RX ADMIN — GABAPENTIN 400 MILLIGRAM(S): 400 CAPSULE ORAL at 07:41

## 2022-12-04 RX ADMIN — Medication 4 MILLIGRAM(S): at 08:56

## 2022-12-04 RX ADMIN — Medication 1 PATCH: at 07:41

## 2022-12-04 RX ADMIN — Medication 4 MILLIGRAM(S): at 21:57

## 2022-12-04 RX ADMIN — LITHIUM CARBONATE 900 MILLIGRAM(S): 300 TABLET, EXTENDED RELEASE ORAL at 20:02

## 2022-12-04 RX ADMIN — GABAPENTIN 400 MILLIGRAM(S): 400 CAPSULE ORAL at 12:55

## 2022-12-05 LAB — SARS-COV-2 RNA SPEC QL NAA+PROBE: DETECTED

## 2022-12-05 PROCEDURE — 99231 SBSQ HOSP IP/OBS SF/LOW 25: CPT

## 2022-12-05 RX ORDER — ARIPIPRAZOLE 15 MG/1
1064 TABLET ORAL ONCE
Refills: 0 | Status: DISCONTINUED | OUTPATIENT
Start: 2022-12-05 | End: 2022-12-05

## 2022-12-05 RX ORDER — ARIPIPRAZOLE 15 MG/1
1064 TABLET ORAL ONCE
Refills: 0 | Status: COMPLETED | OUTPATIENT
Start: 2022-12-05 | End: 2022-12-05

## 2022-12-05 RX ADMIN — Medication 1 SPRAY(S): at 21:46

## 2022-12-05 RX ADMIN — Medication 1 PATCH: at 08:03

## 2022-12-05 RX ADMIN — GABAPENTIN 400 MILLIGRAM(S): 400 CAPSULE ORAL at 13:19

## 2022-12-05 RX ADMIN — LITHIUM CARBONATE 900 MILLIGRAM(S): 300 TABLET, EXTENDED RELEASE ORAL at 20:17

## 2022-12-05 RX ADMIN — Medication 4 MILLIGRAM(S): at 11:12

## 2022-12-05 RX ADMIN — Medication 4 MILLIGRAM(S): at 17:40

## 2022-12-05 RX ADMIN — Medication 1 SPRAY(S): at 08:02

## 2022-12-05 RX ADMIN — GABAPENTIN 400 MILLIGRAM(S): 400 CAPSULE ORAL at 17:40

## 2022-12-05 RX ADMIN — ARIPIPRAZOLE 1064 MILLIGRAM(S): 15 TABLET ORAL at 15:22

## 2022-12-05 RX ADMIN — Medication 4 MILLIGRAM(S): at 05:16

## 2022-12-05 RX ADMIN — GABAPENTIN 400 MILLIGRAM(S): 400 CAPSULE ORAL at 20:17

## 2022-12-05 RX ADMIN — Medication 4 MILLIGRAM(S): at 08:01

## 2022-12-05 RX ADMIN — GABAPENTIN 400 MILLIGRAM(S): 400 CAPSULE ORAL at 08:01

## 2022-12-05 RX ADMIN — Medication 4 MILLIGRAM(S): at 21:09

## 2022-12-05 NOTE — BH INPATIENT PSYCHIATRY PROGRESS NOTE - NSBHFUPINTERVALHXFT_PSY_A_CORE
Patient was seen and is evaluated. She denies diarrhea and stomach pain. She states that she is feeling well but cannot explain why that is the case. She reiterates how she does not like being hospitalized. She did not understand why she will be receiving an injectable medication. We explained to her that this medication can possibly help to prevent/decreases the risk of future hospitalizations. Patient continues to be amenable to going to partial hospitalization and is tolerating medications well. Patient was seen and evaluated at bedside. Reporting mood is "good." She denies diarrhea and stomach pain. She states that she is feeling well but cannot explain why that is the case. She reiterates how she does not like being hospitalized. She did not understand why she will be receiving an injectable medication. We explained to her that this medication can possibly help to prevent/decreases the risk of future hospitalizations. Patient continues to be amenable to going to partial hospitalization and is tolerating medications well. No reported SI/HI or AH/VH. Patient was seen and evaluated for Mood and Psychosis  Reporting mood is "good." She denies diarrhea and stomach pain. She states that she is feeling well but cannot explain why that is the case. She reiterates how she does not like being hospitalized. She did not understand why she will be receiving an injectable medication. We explained to her that this medication can possibly help to prevent/decreases the risk of future hospitalizations. Patient continues to be amenable to going to partial hospitalization and is tolerating medications well. No reported SI/HI or AH/VH.

## 2022-12-05 NOTE — BH INPATIENT PSYCHIATRY PROGRESS NOTE - MSE UNSTRUCTURED FT
Level of consciousness: alert and oriented to person, place, and time  Appearance: well groomed, well dressed, appears stated age, fair eye contact  Speech: normal rate, normal rhythm, normal volume, talkative, slightly pressured  Motor: normal gait, no tics or other abnormal movements  Mood: "good"  Affect: anxious, paranoid, appropriate, constricted, stable  Thought process: slightly illogical  Thought content: no delusions, no hallucinations, no suicidal ideation, no homicidal ideation  Attitude: congenial  Insight: poor (doesn't believe she is mentally ill)  Cognition: oriented in all spheres Level of consciousness: alert and oriented to person, place, and time  Appearance: well groomed, well dressed, appears stated age, fair eye contact  Speech: normal rate, normal rhythm, normal volume, talkative, slightly pressured  Motor: normal gait, no tics or other abnormal movements  Mood: "good"  Affect: anxious, paranoid, appropriate, constricted, stable  Thought process: slightly illogical  Thought content: no delusions, no hallucinations, no reported suicidal ideation or homicidal ideation  Attitude: congenial  Insight: poor (doesn't believe she is mentally ill)  Cognition: grossly intact Level of consciousness: alert and oriented to person, place, and time  Appearance: well groomed, well dressed, appears stated age, fair eye contact  Speech: normal rate, normal rhythm, normal volume, talkative, slightly pressured  Motor: normal gait, no tics or other abnormal movements  Mood: "good"  Affect: anxious, paranoid, appropriate, constricted, stable  Thought process: slightly illogical  Thought content: no delusions, no hallucinations, no reported suicidal ideation or homicidal ideation  Attitude: minimally cooperative  Insight: poor (doesn't believe she is mentally ill)  Cognition: grossly intact

## 2022-12-05 NOTE — BH INPATIENT PSYCHIATRY PROGRESS NOTE - NSBHASSESSSUMMFT_PSY_ALL_CORE
28 y/o single woman.  Patient has PPHx of historical diagnoses of Borderline Personality Disorder, schizoaffective disorder, r/o MDD, Bipolar II Disorder, IED. Patient has 2 prior admissions, last hospitalized at [facility] 2016, and [place] 2018. Patient has hx of drug use (Xanax, cocaine, MJ, opiates, hx of inpatient rehab). Patient is BIB father.  Patient is now hospitalized with a primary problem of worsening depression, agitation, aggression, and emotional dysregulation. Patient admitted to [facility] on a 9.39 legal status.     This patient is no longer at acute risk of harm to self and others, thus does not longer need complete inpatient treatment for stabilization and safety. However, would benefit from partial hospitalization due to continued potential risk of harm to self.    Amenable to continuing current medication regimen.  Li level 0.7  11/29, no signs of toxicity.  Will continue on this dose.  CASTANEDA Aristada initio today.     Plan:  >Legal: 9.39  >Obs: Routine. no need for CO, patient not expected to pose risk to self or others in controlled inpatient setting  >Continue current regimen of psychiatric medications    Standing Psychiatric Medications:  Seroquel (quetiapine) 50mg oral at bedtime  Abilify (Aripiprazole) 30 mg oral at bedtime  Lithium 900 mg oral at bedtime  Gabapentin 400 mg oral 4x/day + PRN for anxiety    Standing Other Medications:  Nicotine Prolacrilax gum 4 mg x 3 hours, PRN for nicotine cravings  Nicotine patch 14 mg/24 hours 1 patch transdermal, daily PRN for cravings    >Labs: Get lithium blood levels. Admission labs reviewed, no acute findings. utox +methadone. A1C and lipid labs grossly normal, QTc <500  >Medical:   No acute concerns. No consultations needed at this time. No indication for CIWA. Patient with consistently stable VS, no visible physical symptoms of withdrawal.   During the course of treatment, will collaborate with medical team to manage medical issues.  >Diet: Regular  >Social: milieu/structured therapy  >Treatment Interventions: Groups and Individual Therapy/CBT, Motivational counseling for substance abuse related issues. Consider CASTANDEA  >Dispo: Collateral and dispo planning pending further symptom and medication optimization 26 y/o single woman.  Patient has PPHx of historical diagnoses of Borderline Personality Disorder, schizoaffective disorder, r/o MDD, Bipolar II Disorder, IED. Patient has 2 prior admissions, last hospitalized at [facility] 2016, and [place] 2018. Patient has hx of drug use (Xanax, cocaine, MJ, opiates, hx of inpatient rehab). Patient is BIB father.  Patient is now hospitalized with a primary problem of worsening depression, agitation, aggression, and emotional dysregulation. Patient admitted to [facility] on a 9.39 legal status, now transferred to  due to covid positive status.     This patient is no longer at acute risk of harm to self and others, thus does not longer need complete inpatient treatment for stabilization and safety. However, would benefit from partial hospitalization due to continued potential risk of harm to self.    Amenable to continuing current medication regimen.  Li level 0.7  11/29, no signs of toxicity.  Will continue on this dose.  CASTANEDA Aristada initio on 12/2/22, plan for Aristada 1064mg today 12/5/22.     Plan:  >Legal: 9.39  >Obs: Routine. no need for CO, patient not expected to pose risk to self or others in controlled inpatient setting  >Continue current regimen of psychiatric medications    Standing Psychiatric Medications:  Seroquel (quetiapine) 50mg oral at bedtime  Abilify (Aripiprazole) 30 mg oral at bedtime  Lithium 900 mg oral at bedtime  Gabapentin 400 mg oral 4x/day + PRN for anxiety    Standing Other Medications:  Nicotine Prolacrilax gum 4 mg x 3 hours, PRN for nicotine cravings  Nicotine patch 14 mg/24 hours 1 patch transdermal, daily PRN for cravings    >Labs: Get lithium blood levels. Admission labs reviewed, no acute findings. utox +methadone. A1C and lipid labs grossly normal, QTc <500  >Medical:   No acute concerns. No consultations needed at this time. No indication for CIWA. Patient with consistently stable VS, no visible physical symptoms of withdrawal.   During the course of treatment, will collaborate with medical team to manage medical issues.  >Diet: Regular  >Social: milieu/structured therapy  >Treatment Interventions: Groups and Individual Therapy/CBT, Motivational counseling for substance abuse related issues. Consider CASTANEDA  >Dispo: Collateral and dispo planning pending further symptom and medication optimization

## 2022-12-05 NOTE — BH INPATIENT PSYCHIATRY PROGRESS NOTE - NSBHCHARTREVIEWVS_PSY_A_CORE FT
Vital Signs Last 24 Hrs  T(C): 36.7 (12-05-22 @ 06:50), Max: 36.7 (12-05-22 @ 06:50)  T(F): 98 (12-05-22 @ 06:50), Max: 98 (12-05-22 @ 06:50)  HR: --  BP: --  BP(mean): --  RR: --  SpO2: --    Orthostatic VS  12-05-22 @ 06:50  Lying BP: --/-- HR: --  Sitting BP: 126/80 HR: 90  Standing BP: 125/82 HR: 105  Site: --  Mode: --  Orthostatic VS  12-04-22 @ 19:23  Lying BP: --/-- HR: --  Sitting BP: 125/88 HR: 95  Standing BP: 131/72 HR: 99  Site: --  Mode: --  Orthostatic VS  12-04-22 @ 18:47  Lying BP: --/-- HR: --  Sitting BP: 131/72 HR: 90  Standing BP: 125/88 HR: 100  Site: --  Mode: --  Orthostatic VS  12-04-22 @ 06:50  Lying BP: --/-- HR: --  Sitting BP: 115/72 HR: 80  Standing BP: 107/54 HR: 65  Site: --  Mode: --  Orthostatic VS  12-03-22 @ 18:17  Lying BP: --/-- HR: --  Sitting BP: 126/84 HR: 102  Standing BP: 114/89 HR: 113  Site: --  Mode: --

## 2022-12-05 NOTE — BH INPATIENT PSYCHIATRY PROGRESS NOTE - CURRENT MEDICATION
MEDICATIONS  (STANDING):  ARIPiprazole 30 milliGRAM(s) Oral at bedtime  gabapentin 400 milliGRAM(s) Oral <User Schedule>  lithium CR (ESKALITH-CR) 900 milliGRAM(s) Oral at bedtime  nicotine - 21 mG/24Hr(s) Patch 1 Patch Transdermal daily    MEDICATIONS  (PRN):  haloperidol     Tablet 5 milliGRAM(s) Oral every 6 hours PRN agitation  haloperidol    Injectable 5 milliGRAM(s) IntraMuscular Once PRN agitation  nicotine  Polacrilex Gum 4 milliGRAM(s) Oral every 3 hours PRN nicotine cravings  QUEtiapine 50 milliGRAM(s) Oral at bedtime PRN mood  sodium chloride 0.65% Nasal 1 Spray(s) Both Nostrils three times a day PRN Nasal Congestion   MEDICATIONS  (STANDING):  ARIPiprazole lauroxil Injectable, Long Acting (ARISTADA) 1064 milliGRAM(s) IntraMuscular once  gabapentin 400 milliGRAM(s) Oral <User Schedule>  lithium CR (ESKALITH-CR) 900 milliGRAM(s) Oral at bedtime  nicotine - 21 mG/24Hr(s) Patch 1 Patch Transdermal daily    MEDICATIONS  (PRN):  haloperidol     Tablet 5 milliGRAM(s) Oral every 6 hours PRN agitation  haloperidol    Injectable 5 milliGRAM(s) IntraMuscular Once PRN agitation  nicotine  Polacrilex Gum 4 milliGRAM(s) Oral every 3 hours PRN nicotine cravings  QUEtiapine 50 milliGRAM(s) Oral at bedtime PRN mood  sodium chloride 0.65% Nasal 1 Spray(s) Both Nostrils three times a day PRN Nasal Congestion

## 2022-12-05 NOTE — BH INPATIENT PSYCHIATRY PROGRESS NOTE - NSBHFUPINTERVALCCFT_PSY_A_CORE
F/u for depression and anxiety+thought disorder  r/o BPD vs BAD or SAD vs both "I don't understand why my father thinks I needed to be in the hospital"

## 2022-12-06 PROBLEM — F31.9 BIPOLAR DISORDER, UNSPECIFIED: Chronic | Status: ACTIVE | Noted: 2022-11-12

## 2022-12-06 LAB — LITHIUM SERPL-MCNC: 0.8 MMOL/L — SIGNIFICANT CHANGE UP (ref 0.6–1.2)

## 2022-12-06 RX ADMIN — Medication 4 MILLIGRAM(S): at 12:05

## 2022-12-06 RX ADMIN — Medication 4 MILLIGRAM(S): at 21:52

## 2022-12-06 RX ADMIN — GABAPENTIN 400 MILLIGRAM(S): 400 CAPSULE ORAL at 20:44

## 2022-12-06 RX ADMIN — Medication 4 MILLIGRAM(S): at 05:47

## 2022-12-06 RX ADMIN — Medication 4 MILLIGRAM(S): at 15:34

## 2022-12-06 RX ADMIN — LITHIUM CARBONATE 900 MILLIGRAM(S): 300 TABLET, EXTENDED RELEASE ORAL at 20:30

## 2022-12-06 RX ADMIN — GABAPENTIN 400 MILLIGRAM(S): 400 CAPSULE ORAL at 16:39

## 2022-12-06 RX ADMIN — GABAPENTIN 400 MILLIGRAM(S): 400 CAPSULE ORAL at 12:05

## 2022-12-06 RX ADMIN — GABAPENTIN 400 MILLIGRAM(S): 400 CAPSULE ORAL at 08:15

## 2022-12-06 RX ADMIN — Medication 1 PATCH: at 08:15

## 2022-12-06 RX ADMIN — Medication 4 MILLIGRAM(S): at 08:15

## 2022-12-06 RX ADMIN — Medication 4 MILLIGRAM(S): at 18:50

## 2022-12-06 NOTE — BH PSYCHOLOGY - GROUP THERAPY NOTE - NSBHPSYCHOLGRPTYPE_PSY_A_CORE
Cognitive Behavioral Coping Skills

## 2022-12-06 NOTE — BH PSYCHOLOGY - GROUP THERAPY NOTE - NSPSYCHOLGRPCOGPROB_PSY_A_CORE FT
Anxiety, Depression, Emotion dysregulation, Lack of coping skills 
Anxiety, Depression, Emotion Dysregulation, Poor Coping Skills, Psychosocial Stressors, Inadequate Self-Care, Ineffective Problem Solving Skills. 
Anxiety, Depression, Emotion dysregulation, Lack of coping skills 

## 2022-12-06 NOTE — BH PSYCHOLOGY - GROUP THERAPY NOTE - NSPSYCHOLGRPCOGAFCT_PSY_A_CORE FT
Downcast
Constricted 

## 2022-12-06 NOTE — BH PSYCHOLOGY - GROUP THERAPY NOTE - NSBHPSYCHOLPARTICIP_PSY_A_CORE
Fully engaged
Partially engaged
Partially engaged
Fully engaged
Fully engaged

## 2022-12-06 NOTE — BH PSYCHOLOGY - GROUP THERAPY NOTE - NSPSYCHOLGRPBILLING_PSY_A_CORE
[id number] - Group Psychotherapy
[id number] - Group Psychotherapy
[id number] - Group Psychotherapy
[id number] - Group Psychotherapy
[id number] - Group Psychotherapy
[id number] - Group Psychotherapy
[id number] - Group Psychotherapy
[id number] - Group Psychotherapy
[id number] - Group Psychotherapy

## 2022-12-06 NOTE — BH INPATIENT PSYCHIATRY PROGRESS NOTE - NSBHFUPINTERVALHXFT_PSY_A_CORE
Patient was seen and evaluated for Mood and Psychosis  Reporting mood is "good." She states that she is feeling well and had coffee for breakfast. She had several questions written down on a piece of paper that she asked us, including when she was going to be discharged, how she would commute to the partial hospitalization, and whether her outpatient psychiatrist would give her the injections. Appeared visibly irritated when was told that we would only be able to discharge her once there is space for her in the partial hospitalization program, and that we are still waiting back from them. We told her that her outpatient psychiatrist would be able to give her the injections. Patient has been frequently speaking to her father on the phone and asked us if she should keep doing that; we said that it's up to her. Patient continues to be amenable to going to partial hospitalization and is tolerating medications well. No reported SI/HI or AH/VH. Patient was seen and evaluated for Mood and Psychosis. No acute events overnight, no PRN medications required or received.  Reporting mood is "good." She states that she is feeling well and had coffee for breakfast. She had several questions written down on a piece of paper that she asked us, including when she was going to be discharged, how she would commute to the partial hospitalization, and whether her outpatient psychiatrist would give her the injections. Appeared visibly irritated when was told that we would only be able to discharge her once there is space for her in the partial hospitalization program, and that we are still waiting back from them. We told her that her outpatient psychiatrist would be able to give her the injections. Patient has been frequently speaking to her father on the phone and asked us if she should keep doing that; we said that it's up to her. Patient continues to be amenable to going to partial hospitalization and is tolerating medications well. No reported SI/HI or AH/VH.

## 2022-12-06 NOTE — BH INPATIENT PSYCHIATRY PROGRESS NOTE - NSBHCHARTREVIEWVS_PSY_A_CORE FT
Vital Signs Last 24 Hrs  T(C): 36.9 (12-06-22 @ 08:06), Max: 36.9 (12-06-22 @ 08:06)  T(F): 98.4 (12-06-22 @ 08:06), Max: 98.4 (12-06-22 @ 08:06)  HR: --  BP: --  BP(mean): --  RR: --  SpO2: --    Orthostatic VS  12-06-22 @ 08:06  Lying BP: --/-- HR: --  Sitting BP: 114/66 HR: 90  Standing BP: 100/60 HR: 103  Site: --  Mode: --  Orthostatic VS  12-05-22 @ 18:38  Lying BP: --/-- HR: --  Sitting BP: 116/68 HR: 100  Standing BP: 100/60 HR: 116  Site: --  Mode: --  Orthostatic VS  12-05-22 @ 06:50  Lying BP: --/-- HR: --  Sitting BP: 126/80 HR: 90  Standing BP: 125/82 HR: 105  Site: --  Mode: --  Orthostatic VS  12-04-22 @ 19:23  Lying BP: --/-- HR: --  Sitting BP: 125/88 HR: 95  Standing BP: 131/72 HR: 99  Site: --  Mode: --  Orthostatic VS  12-04-22 @ 18:47  Lying BP: --/-- HR: --  Sitting BP: 131/72 HR: 90  Standing BP: 125/88 HR: 100  Site: --  Mode: --

## 2022-12-06 NOTE — BH PSYCHOLOGY - GROUP THERAPY NOTE - NSPSYCHOLGRPCOGINT_PSY_A_CORE
other..
mindfulness skills taught/other..
mindfulness skills taught/other..
other..
mindfulness skills taught/other..
other..
mindfulness skills taught/other..

## 2022-12-06 NOTE — BH PSYCHOLOGY - GROUP THERAPY NOTE - NSBHPSYCHOLRESPONSE_PSY_A_CORE
Coping skills acquired
Coping skills acquired/Accepted support
Symptoms reduced/Coping skills acquired/Accepted support
Coping skills acquired

## 2022-12-06 NOTE — BH INPATIENT PSYCHIATRY PROGRESS NOTE - CURRENT MEDICATION
MEDICATIONS  (STANDING):  gabapentin 400 milliGRAM(s) Oral <User Schedule>  lithium CR (ESKALITH-CR) 900 milliGRAM(s) Oral at bedtime  nicotine - 21 mG/24Hr(s) Patch 1 Patch Transdermal daily    MEDICATIONS  (PRN):  haloperidol     Tablet 5 milliGRAM(s) Oral every 6 hours PRN agitation  haloperidol    Injectable 5 milliGRAM(s) IntraMuscular Once PRN agitation  nicotine  Polacrilex Gum 4 milliGRAM(s) Oral every 3 hours PRN nicotine cravings  QUEtiapine 50 milliGRAM(s) Oral at bedtime PRN mood  sodium chloride 0.65% Nasal 1 Spray(s) Both Nostrils three times a day PRN Nasal Congestion

## 2022-12-06 NOTE — BH PSYCHOLOGY - GROUP THERAPY NOTE - NSPSYCHOLGRPCOGGOAL_PSY_A_CORE FT
Decrease symptoms, Develop coping/emotion regulation skills, Increase value-based action, Psychoeducation 
Decrease symptoms, Develop cognitive/emotion regulation skills, improve insight from psychoeducation

## 2022-12-06 NOTE — BH PSYCHOLOGY - GROUP THERAPY NOTE - NSPSYCHOLGRPCOGPT_PSY_A_CORE FT
Patient attended Cognitive Behavioral Therapy Group. Acceptance and Commitment Therapy principles, concepts, skills and techniques were also utilized in the group. The group started with group rules, introductions and brief individual check-ins. The topics discussed in group were: Values, Choices, Responsibilities and Internal Locus of Control.  facilitated the group process, as well as provided support and psychoeducation. 
Patient attended recovery oriented/acceptance & commitment therapy group. Group engaged in discussion on values and goals. Session explored the meaning of values verses goals, how values are developed over time, and values pts learned in their upbringing. Pts discussed [employer] values, kindness, respect, sharing, and prosperity. They offered their definition and understanding of their personal values. Group then engaged in exercise where pts read a value and a goal written on a slip of paper (get an A, learning) and group discussed what was the value and what was the goal. Members understood that goals are achievable and tend to be guided by personal values. Members were encouraged to reflect on meaningful values in their lives.  facilitated discussion of concepts, encouraged active participation, and supported members providing feedback to peers. 
Patient attended recovery oriented/acceptance & commitment therapy group. Group started with check in prompt regarding how to handle boredom. Members discussed difficulty with boredom and how they cope. Members identified books, talking with friends, walking, listening to music, and cooking as things they do to cope with boredom. Members reflected on what negative consequences of boredom like using substances (alcohol and other drugs) that offer relief from boredom. Members then engaged in photo search activity to support staying focused and in the present moment. Initially, activity was met with resistance but with support, members fully participated in activity of finding hidden objects in a picture. After the exercise, members processed their current level of boredom and reflected on the change in attitude when starting (v completing exercise).  facilitated discussion of concepts, encouraged active participation, and supported members providing feedback to peers.
Patient attended recovery oriented/acceptance & commitment therapy group. Group focused on ways of connecting with the present moment through progressive muscle relaxation. Members identified where in their bodies they experience tension when under stress. Members discussed their chest, their backs, neck, eyes, shoulders and stomach. Members were guided through a progressive muscle relaxation after rating their current muscle tension (0-100). Processed the exercise and brainstormed contexts during which they could utilize this skill. Group ended with discussion of what they tell themselves when having a bad day.  facilitated discussion of concepts, encouraged active participation, and supported members providing feedback to peers.
Patient attended recovery oriented/acceptance & commitment therapy group. Group started with visualization exercise. Members discussed and imagined their peaceful scene - beach, lake, walking their dog, and green pastures. Members were guided through imagery exercise and processed their reactions. Members reported that they felt somewhat distracted by the environment and that they were able to immerse themselves in the scenes they created. Discussed circumstances to implement visualization - feeing overwhelmed, before bed, and when confused. Group then conversed about clarifying values related to work and relationships. Members stated that they would like to behave respectfully and kindly in their relationships and discussed how they want to treat others in relationship and in the workplace.   facilitated discussion of concepts, encouraged active participation, and supported members providing feedback to peers.
Patient attended recovery oriented/acceptance & commitment therapy group. Group started with exercise to enhance contact with the present moment. Members utilized their attention/concentration and memory skills to generate a list of items to bring on a trip to [place]. Members added one item to the list and then recalled the previously noted items. We discussed cognitive strategies utilized in the exercise and ways in which those skills translate into the real-world. Group then focused on opposites and balance. To open discussion, members looked at three optical illusions (e.g., image of duck and rabbit depending on fore and background) to introduce notion that two seemingly opposing truths can exist.  facilitated discussion of concepts, encouraged active participation, and supported members providing feedback to peers.
Patient attended recovery oriented/acceptance & commitment therapy group. Group started with check in prompt to encourage participation (current energy level). Group continued yesterday's discussion of personal values. Members reviewed the meaning of a value and read through a list of 58 commonly held values. Members reflected on the values listed and circled their current top 10 values. Members shared their responses including equality, responsibility, friendliness, curiosity, trust, order, and freedom. In sharing their values, members highlighted the ways in which their values inform how they act toward themselves and others on a daily basis. Members selected one value they hope to incorporate into the rest of their day.  facilitated discussion of concepts, encouraged active participation, and supported members providing feedback to peers.  
Patient attended recovery oriented/acceptance & commitment therapy group. Group started with paired check in (activities that energize/activate you). Members introduced their partners to the group and shared the activities which ranged from dancing, teaching, sports, comedy, puzzles, and walking. Members demonstrated interest in one another by asking questions about the activities mentioned. Group then focused on worksheet “Important Opposites to Balance.” Group explored what it means to hold two seemingly opposed thoughts/feelings (feeling sad and happy; loving yourself and being mad at yourself; being lonely and being around others). Discussed benefits of allowing opposites to both exist when feeling a particular way (offers perspective and clarity).  facilitated discussion of concepts, encouraged active participation, and supported members providing feedback to peers.
Patient attended recovery oriented/acceptance & commitment therapy group. Group started with reflection on a quote by Sotero Tomas about successes and failures. The quote prompted insightful exploration about ruminations, failures as a part of success, and ways of responding to failure. Group then discussed metaphor for willingness, acceptance, and values. Members learned about junkmail metaphor - we do not choose the thoughts that enter our minds but we choose which thoughts we respond to (important mail) and which thoughts we ignore (junk mail). Members engaged in dialogue about how to decide whether something is considered "junkmail." Members responded to a list of thoughts and categorized the thoughts as either important mail or junk mail.  facilitated discussion of concepts, encouraged active participation, and supported members providing feedback to peers.

## 2022-12-06 NOTE — BH PSYCHOLOGY - GROUP THERAPY NOTE - NSPSYCHOLGRPCOGPT_PSY_A_CORE
participated in exercise/other...
other...

## 2022-12-06 NOTE — BH PSYCHOLOGY - GROUP THERAPY NOTE - NSPSYCHOLGRPCOGINT_PSY_A_CORE FT
Acceptance & commitment therapy, Emotion regulation/coping skills taught, Psychoeducation 
Cognitive Behavioral Therapy, Acceptance and Commitment Therapy, Emotion Regulation/Coping Skills taught, Psychoeducation 
Acceptance & commitment therapy, Emotion regulation/coping skills taught, Psychoeducation 

## 2022-12-06 NOTE — BH PSYCHOLOGY - GROUP THERAPY NOTE - TOKEN PULL-DIAGNOSIS
Primary Diagnosis:  Schizoaffective disorder [F25.9]        Problem Dx:   Aggression [R46.89]      Noncompliance with medications [Z91.14]      Borderline personality disorder [F60.3]      

## 2022-12-06 NOTE — BH INPATIENT PSYCHIATRY PROGRESS NOTE - NSBHASSESSSUMMFT_PSY_ALL_CORE
26 y/o single woman.  Patient has PPHx of historical diagnoses of Borderline Personality Disorder, schizoaffective disorder, r/o MDD, Bipolar II Disorder, IED. Patient has 2 prior admissions, last hospitalized at [facility] 2016, and [place] 2018. Patient has hx of drug use (Xanax, cocaine, MJ, opiates, hx of inpatient rehab). Patient is BIB father.  Patient is now hospitalized with a primary problem of worsening depression, agitation, aggression, and emotional dysregulation. Patient admitted to [facility] on a 9.39 legal status, now transferred to  due to covid positive status.     This patient is no longer at acute risk of harm to self and others, thus does not longer need complete inpatient treatment for stabilization and safety. However, would benefit from partial hospitalization due to continued potential risk of harm to self. Still awaiting available spot at partial hospitalization program.    Amenable to continuing current medication regimen.  Li level 0.7  11/29, no signs of toxicity.  Will continue on this dose.  CASTANEDA Aristada initially on 12/2/22, then received Aristada long acting injectable 1064mg on 12/5/22, well tolerated. Plan for another long acting injectable dose of Aristada in 6 weeks.     Plan:  >Legal: 9.39  >Obs: Routine. no need for CO, patient not expected to pose risk to self or others in controlled inpatient setting  >Continue current regimen of psychiatric medications    Medications (standing):  gabapentin 400 milliGRAM(s) Oral <User Schedule>  lithium CR (ESKALITH-CR) 900 milliGRAM(s) Oral at bedtime  nicotine - 21 mG/24Hr(s) Patch 1 Patch Transdermal daily    MEDICATIONS  (PRN):  haloperidol     Tablet 5 milliGRAM(s) Oral every 6 hours PRN agitation  haloperidol    Injectable 5 milliGRAM(s) IntraMuscular Once PRN agitation  nicotine  Polacrilex Gum 4 milliGRAM(s) Oral every 3 hours PRN nicotine cravings  QUEtiapine 50 milliGRAM(s) Oral at bedtime PRN mood  sodium chloride 0.65% Nasal 1 Spray(s) Both Nostrils three times a day PRN Nasal Congestion    >Labs: Get lithium blood levels. Admission labs reviewed, no acute findings. utox +methadone. A1C and lipid labs grossly normal, QTc <500  >Medical:   No acute concerns. No consultations needed at this time. No indication for CIWA. Patient with consistently stable VS, no visible physical symptoms of withdrawal.   During the course of treatment, will collaborate with medical team to manage medical issues.  >Diet: Regular  >Social: milieu/structured therapy  >Treatment Interventions: Groups and Individual Therapy/CBT, Motivational counseling for substance abuse related issues. Consider CASTANEDA  >Dispo: Collateral and dispo planning pending further symptom and medication optimization 28 y/o single woman.  Patient has PPHx of historical diagnoses of Borderline Personality Disorder, schizoaffective disorder, r/o MDD, Bipolar II Disorder, IED. Patient has 2 prior admissions, last hospitalized at [facility] 2016, and [place] 2018. Patient has hx of drug use (Xanax, cocaine, MJ, opiates, hx of inpatient rehab). Patient is BIB father.  Patient is now hospitalized with a primary problem of worsening depression, agitation, aggression, and emotional dysregulation. Patient admitted to [facility] on a 9.39 legal status, now transferred to  due to covid positive status.     Amenable to continuing current medication regimen.  Li level 0.7  11/29, no signs of toxicity.  Will continue on this dose.  CASTANEDA Aristada initially on 12/2/22, then received Aristada long acting injectable 1064mg on 12/5/22, well tolerated. Plan for another long acting injectable dose of Aristada in 6 weeks. No reported AH/VH or SI/HI.    Remains with blunted affect, intermittent anxiety and irritability though continues to display good behavioral control and is adherent with treatment recommendations. Would benefit from partial hospitalization due to continued potential risk of harm to self. Still awaiting available spot at partial hospitalization program. Continues to require inpatient hospitalization for safety and stabilization.     Plan:  >Legal: 9.39  >Obs: Routine. no need for CO, patient not expected to pose risk to self or others in controlled inpatient setting  >Continue current regimen of psychiatric medications    Medications (standing):  gabapentin 400 milliGRAM(s) Oral <User Schedule>  lithium CR (ESKALITH-CR) 900 milliGRAM(s) Oral at bedtime  nicotine - 21 mG/24Hr(s) Patch 1 Patch Transdermal daily    MEDICATIONS  (PRN):  haloperidol     Tablet 5 milliGRAM(s) Oral every 6 hours PRN agitation  haloperidol    Injectable 5 milliGRAM(s) IntraMuscular Once PRN agitation  nicotine  Polacrilex Gum 4 milliGRAM(s) Oral every 3 hours PRN nicotine cravings  QUEtiapine 50 milliGRAM(s) Oral at bedtime PRN mood  sodium chloride 0.65% Nasal 1 Spray(s) Both Nostrils three times a day PRN Nasal Congestion    >Labs: LI level 0.7 on 11/29. Awaiting results on repeat level to be drawn today, 12/6. Admission labs reviewed, no acute findings. utox +methadone. A1C and lipid labs grossly normal, QTc <500  >Medical:   No acute concerns. No consultations needed at this time. No indication for CIWA. Patient with consistently stable VS, no visible physical symptoms of withdrawal.   During the course of treatment, will collaborate with medical team to manage medical issues.  >Diet: Regular  >Social: milieu/structured therapy  >Treatment Interventions: Groups and Individual Therapy/CBT, Motivational counseling for substance abuse related issues. Consider CASTANEDA  >Dispo: Collateral and dispo planning pending further symptom and medication optimization. Awaiting response from [facility].

## 2022-12-06 NOTE — BH PSYCHOLOGY - GROUP THERAPY NOTE - NSBHPSYCHOLPARTICIPCOMMENT_PSY_A_CORE FT
Pt partially engaged in group. She engaged in PMR exercise but otherwise, seemed disconnected from group content. 
Pt participated in engagement exercise and shared with group her love for pizza and pasta. She read from handout and participated in group; however, at end of group, pt became irritable and showed frustration toward writer because she reported being unable to understand concepts from the worksheet. 
Pt engaged in group discussion. She showed concrete TP and stated she felt embarrassed by facilitator and asked writer to stop starring at her. Writer explained that there was no intention of putting her on the spot and encouraged pt to express as much or as little as she wished. 
Pt engaged in group discussion. She stated that walking her dog is her "happy place." Pt did listen to peers and largely stay on topic of visualization and values. Pt asked out of context question about showering and getting towels during group.
Pt engaged in group. She presented with difficulty responding to her peers and refused to engage in photo search activity because another peer didn't want to participate. With support and validation, pt did engage in photo search activity and added how she geoff with boredom (cooking, friends). 
Pt engaged in group discussion. She showed concrete TP. At times, she made comments off topic like "is it going to snow?" She reported curiosity as an important value in her life ("asking questions"). 
Patient attended Psychology Group in its entirety, and actively and meaningfully participated in the group discussion on Values, Choices, Responsibility and Internal Locus of Control. Patient shared that she finds it easier to blame others for her mistakes. She reported that she often fears the "unknown" which negative impacts her decision making. She strongly resonated with the phrase, "the devil you know vs. the devil you don't" when discussing poor decision making of the past. She was open and receptive to psychoeducation on how values can provide a roadmap toward a meaningful and fulfilling life. 
Pt engaged in group. She was seen with her head facing downwards but at other times, offered relevant and helpful responses. 
Pt engaged well in group. She became distracted near the end of group and started asking about drinking Pepsi after seeing Pepsi cans. She engaged in concentration activity and showed adequate ability to attend to and recall information.

## 2022-12-06 NOTE — BH INPATIENT PSYCHIATRY PROGRESS NOTE - MSE UNSTRUCTURED FT
Level of consciousness: alert and oriented to person, place, and time  Appearance: well groomed, well dressed, appears stated age, fair eye contact  Speech: normal rate, normal rhythm, normal volume, talkative  Motor: normal gait, no tics or other abnormal movements  Mood: "good"  Affect: anxious, irritable, appropriate, constricted, stable  Thought process: slightly illogical  Thought content: no delusions, no hallucinations, no reported suicidal ideation or homicidal ideation  Attitude: cooperative  Insight: poor  Cognition: grossly intact General: calm and cooperative  Appearance: well groomed, well dressed, appears stated age, fair eye contact  Speech: normal rate, normal rhythm, normal volume, talkative  Motor: normal gait, no tics or other abnormal movements noted  Mood: "good"  Affect: anxious, irritable, constricted, stable  Thought process: slightly illogical  Thought content: +discharge focus, no delusions, no hallucinations, no reported suicidal ideation or homicidal ideation  Insight: poor  Judgment: fair  Impulse control: fair on this assessment  Cognition: grossly intact

## 2022-12-06 NOTE — BH PSYCHOLOGY - GROUP THERAPY NOTE - NSBHPTASSESSDT_PSY_A_CORE
25-Nov-2022 10:15
17-Nov-2022 10:15
21-Nov-2022 10:15
14-Nov-2022 10:15
16-Nov-2022 10:15
18-Nov-2022 10:15
06-Dec-2022 11:15
15-Nov-2022 10:15
22-Nov-2022 10:15

## 2022-12-06 NOTE — BH PSYCHOLOGY - GROUP THERAPY NOTE - NSBHPSYCHOLGRPNAME_PSY_A_CORE
CBT (Cognitive Behavioral Therapy) Group

## 2022-12-07 PROCEDURE — 99231 SBSQ HOSP IP/OBS SF/LOW 25: CPT

## 2022-12-07 RX ORDER — GABAPENTIN 400 MG/1
600 CAPSULE ORAL
Refills: 0 | Status: DISCONTINUED | OUTPATIENT
Start: 2022-12-07 | End: 2022-12-14

## 2022-12-07 RX ADMIN — Medication 4 MILLIGRAM(S): at 20:06

## 2022-12-07 RX ADMIN — GABAPENTIN 600 MILLIGRAM(S): 400 CAPSULE ORAL at 20:25

## 2022-12-07 RX ADMIN — Medication 1 PATCH: at 08:00

## 2022-12-07 RX ADMIN — Medication 4 MILLIGRAM(S): at 11:05

## 2022-12-07 RX ADMIN — Medication 1 PATCH: at 08:01

## 2022-12-07 RX ADMIN — GABAPENTIN 600 MILLIGRAM(S): 400 CAPSULE ORAL at 16:07

## 2022-12-07 RX ADMIN — Medication 1 SPRAY(S): at 21:54

## 2022-12-07 RX ADMIN — GABAPENTIN 400 MILLIGRAM(S): 400 CAPSULE ORAL at 11:05

## 2022-12-07 RX ADMIN — GABAPENTIN 400 MILLIGRAM(S): 400 CAPSULE ORAL at 07:45

## 2022-12-07 RX ADMIN — Medication 4 MILLIGRAM(S): at 14:06

## 2022-12-07 RX ADMIN — LITHIUM CARBONATE 900 MILLIGRAM(S): 300 TABLET, EXTENDED RELEASE ORAL at 20:25

## 2022-12-07 RX ADMIN — Medication 4 MILLIGRAM(S): at 07:46

## 2022-12-07 NOTE — BH INPATIENT PSYCHIATRY PROGRESS NOTE - NSBHFUPINTERVALHXFT_PSY_A_CORE
Patient was seen and evaluated for Mood and Psychosis. No acute events overnight, no PRN medications required or received.  Reporting mood is "good." She had several questions written down on a piece of paper that she asked us, including whether it would be possible to change her Gabapentin dosage to help more with anxiety. She is currently taking it four times per day, and we asked her whether she'd be able to take it consistently at home. Patient didn't seem to understand the question, and said that whether she would be able to would depend on us. Upon further pressing, she said that her father might be involved in giving her medications at home. During the interview, patient stated that she does not have a drug and alcohol problem anymore because she stopped using a month ago. 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 She again asked about when she would be able to be discharged, and we reiterated to her that we will be able to discharge her once there is space for her in the partial hospitalization program, and that we are still waiting for a spot for her in the program. Patient continues to be amenable to going to partial hospitalization and is tolerating medications well. No reported SI/HI or AH/VH.

## 2022-12-07 NOTE — UM REPORT PROGRESS NOTE - NSUMSWACTION_GEN_A_CORE FT
SW discussed discharge options with pt and pt's father. They both agreed, along with treatment team, that partial hospitalization would best meet patient's needs. SW referred pt to [facility] and is awaiting a start date. Pt's father's has concerns over leaving pt alone while he is at work, worried about her judgment. SW discuss case management and mental health housing options that can be pursued post discharge if pt. is willing. SW contacted medicaid liaison to explore pt's medicaid eligibility, however pt's SSI income makes her medicaid ineligible.

## 2022-12-07 NOTE — UM REPORT PROGRESS NOTE - NSUMSWNOTE_GEN_A_CORE FT
Pt's mood has been labile. She is discharged focused, willing to attend [facility] hospital. She has been contacting father often, complaining about other patients teasing her. Pt is maintaining good behavioral control despite frustration over continued hospitalization.

## 2022-12-07 NOTE — BH INPATIENT PSYCHIATRY PROGRESS NOTE - NSBHCHARTREVIEWVS_PSY_A_CORE FT
Vital Signs Last 24 Hrs  T(C): 36.4 (12-07-22 @ 08:33), Max: 36.4 (12-07-22 @ 08:33)  T(F): 97.6 (12-07-22 @ 08:33), Max: 97.6 (12-07-22 @ 08:33)  HR: --  BP: --  BP(mean): --  RR: --  SpO2: --    Orthostatic VS  12-07-22 @ 08:33  Lying BP: --/-- HR: --  Sitting BP: 112/72 HR: 103  Standing BP: 109/68 HR: 108  Site: --  Mode: --  Orthostatic VS  12-06-22 @ 18:51  Lying BP: --/-- HR: --  Sitting BP: 112/75 HR: 108  Standing BP: 116/80 HR: 109  Site: --  Mode: --  Orthostatic VS  12-06-22 @ 08:06  Lying BP: --/-- HR: --  Sitting BP: 114/66 HR: 90  Standing BP: 100/60 HR: 103  Site: --  Mode: --  Orthostatic VS  12-05-22 @ 18:38  Lying BP: --/-- HR: --  Sitting BP: 116/68 HR: 100  Standing BP: 100/60 HR: 116  Site: --  Mode: --

## 2022-12-07 NOTE — BH INPATIENT PSYCHIATRY PROGRESS NOTE - NSBHASSESSSUMMFT_PSY_ALL_CORE
26 y/o single woman.  Patient has PPHx of historical diagnoses of Borderline Personality Disorder, schizoaffective disorder, r/o MDD, Bipolar II Disorder, IED. Patient has 2 prior admissions, last hospitalized at [facility] 2016, and [place] 2018. Patient has hx of drug use (Xanax, cocaine, MJ, opiates, hx of inpatient rehab). Patient is BIB father.  Patient is now hospitalized with a primary problem of worsening depression, agitation, aggression, and emotional dysregulation. Patient admitted to [facility] on a 9.39 legal status, now transferred to  due to covid positive status.     Amenable to continuing current medication regimen, but would like an increase in Gabapentin dosage for anxiety. We will increase Gabapentin dosage to 600 mG 4x per day. Li level 0.8 12/6, no signs of toxicity.  Will continue on this dose.  CASTANEDA Aristada initially on 12/2/22, then received Aristada long acting injectable 1064mg on 12/5/22, well tolerated. Plan for another long acting injectable dose of Aristada in 6 weeks. No reported AH/VH or SI/HI.    Remains with blunted affect, intermittent anxiety though continues to display good behavioral control and is adherent with treatment recommendations. Would benefit from partial hospitalization due to continued potential risk of harm to self. Still awaiting available spot at partial hospitalization program. Continues to require inpatient hospitalization for safety and stabilization.     Plan:  >Legal: 9.39  >Obs: Routine. no need for CO, patient not expected to pose risk to self or others in controlled inpatient setting  >Continue current regimen of psychiatric medications but with an increase in Gabapentin dosage to 600 mg 4x per day    Medications (standing):  gabapentin 400 milliGRAM(s) Oral <User Schedule>  lithium CR (ESKALITH-CR) 900 milliGRAM(s) Oral at bedtime  nicotine - 21 mG/24Hr(s) Patch 1 Patch Transdermal daily    MEDICATIONS  (PRN):  haloperidol     Tablet 5 milliGRAM(s) Oral every 6 hours PRN agitation  haloperidol    Injectable 5 milliGRAM(s) IntraMuscular Once PRN agitation  nicotine  Polacrilex Gum 4 milliGRAM(s) Oral every 3 hours PRN nicotine cravings  QUEtiapine 50 milliGRAM(s) Oral at bedtime PRN mood  sodium chloride 0.65% Nasal 1 Spray(s) Both Nostrils three times a day PRN Nasal Congestion    >Labs: LI level 0.8 on 12/6. Admission labs reviewed, no acute findings. utox +methadone. A1C and lipid labs grossly normal, QTc <500  >Medical:   No acute concerns. No consultations needed at this time. No indication for CIWA. Patient with consistently stable VS, no visible physical symptoms of withdrawal.   During the course of treatment, will collaborate with medical team to manage medical issues.  >Diet: Regular  >Social: milieu/structured therapy  >Treatment Interventions: Groups and Individual Therapy/CBT, Motivational counseling for substance abuse related issues. Consider CASTANEDA  >Dispo: Collateral and dispo planning pending further symptom and medication optimization. Awaiting response from [facility]. 28 y/o single woman.  Patient has PPHx of historical diagnoses of Borderline Personality Disorder, schizoaffective disorder, r/o MDD, Bipolar II Disorder, IED. Patient has 2 prior admissions, last hospitalized at [facility] 2016, and [place] 2018. Patient has hx of drug use (Xanax, cocaine, MJ, opiates, hx of inpatient rehab). Patient is BIB father.  Patient is now hospitalized with a primary problem of worsening depression, agitation, aggression, and emotional dysregulation. Patient admitted to [facility] on a 9.39 legal status, now transferred to  due to covid positive status.     Amenable to continuing current medication regimen, but would like an increase in Gabapentin dosage for anxiety. We will increase Gabapentin dosage to 600 mG 4x per day. Li level 0.8 12/6, no signs of toxicity.  Will continue on this dose.  CASTANEDA Aristada initially on 12/2/22, then received Aristada long acting injectable 1064mg on 12/5/22, well tolerated. Plan for another long acting injectable dose of Aristada in 6 weeks. No reported AH/VH or SI/HI.    Remains with significant anxiety and pressured speech and racing thoughts  though continues to display good behavioral control and is adherent with treatment recommendations. Would benefit from partial hospitalization due to continued potential risk of harm to self. Continues to require inpatient hospitalization for safety and stabilization.     Plan:  >Legal: 9.39  >Obs: Routine. no need for CO, patient not expected to pose risk to self or others in controlled inpatient setting  >Continue current regimen of psychiatric medications but with an increase in Gabapentin dosage to 600 mg 4x per day    Medications (standing):  increase to gabapentin 600 milliGRAM(s) Oral QID  lithium CR (ESKALITH-CR) 900 milliGRAM(s) Oral at bedtime  nicotine - 21 mG/24Hr(s) Patch 1 Patch Transdermal daily    MEDICATIONS  (PRN):  haloperidol     Tablet 5 milliGRAM(s) Oral every 6 hours PRN agitation  haloperidol    Injectable 5 milliGRAM(s) IntraMuscular Once PRN agitation  nicotine  Polacrilex Gum 4 milliGRAM(s) Oral every 3 hours PRN nicotine cravings  QUEtiapine 50 milliGRAM(s) Oral at bedtime PRN mood  sodium chloride 0.65% Nasal 1 Spray(s) Both Nostrils three times a day PRN Nasal Congestion    >Labs: LI level 0.8 on 12/6. Admission labs reviewed, no acute findings. utox +methadone. A1C and lipid labs grossly normal, QTc <500  >Medical:   No acute concerns. No consultations needed at this time. No indication for CIWA. Patient with consistently stable VS, no visible physical symptoms of withdrawal.   During the course of treatment, will collaborate with medical team to manage medical issues.  >Diet: Regular  >Social: milieu/structured therapy  >Treatment Interventions: Groups and Individual Therapy/CBT, Motivational counseling for substance abuse related issues. Consider CASTANEDA  >Dispo: Collateral and dispo planning pending further symptom and medication optimization. Awaiting response from [facility] PHP.

## 2022-12-07 NOTE — BH INPATIENT PSYCHIATRY PROGRESS NOTE - MSE UNSTRUCTURED FT
General: wired, anxious  Appearance: well groomed, well dressed, appears stated age, fair eye contact  Speech: pressured, normal rhythm, normal volume, talkative  Motor: normal gait, no tics or other abnormal movements noted  Mood: "good"  Affect: anxious, constricted, stable, blunted, incongruent  Thought process: illogical, racing  Thought content: +discharge focus, no delusions, no hallucinations, no reported suicidal ideation or homicidal ideation  Insight: poor  Judgment: fair  Impulse control: fair on this assessment  Cognition: grossly intact General: appears "wired", anxious  Appearance: well groomed, well dressed, appears stated age, fair eye contact  Speech: pressured, normal rhythm, normal volume, talkative  Motor: normal gait, no tics or other abnormal movements noted  Mood: "good"  Affect: anxious, constricted, stable, incongruent  Thought process: illogical, racing, with some flight of ideas  Thought content: +discharge focus, no delusions, no reported suicidal ideation or homicidal ideation  Perception no hallucinations  Insight: poor  Judgment: fair  Impulse control: fair on this assessment  Cognition: grossly intact

## 2022-12-08 PROCEDURE — 99231 SBSQ HOSP IP/OBS SF/LOW 25: CPT

## 2022-12-08 RX ADMIN — Medication 4 MILLIGRAM(S): at 10:19

## 2022-12-08 RX ADMIN — Medication 1 SPRAY(S): at 20:25

## 2022-12-08 RX ADMIN — Medication 4 MILLIGRAM(S): at 17:40

## 2022-12-08 RX ADMIN — GABAPENTIN 600 MILLIGRAM(S): 400 CAPSULE ORAL at 16:09

## 2022-12-08 RX ADMIN — Medication 4 MILLIGRAM(S): at 14:39

## 2022-12-08 RX ADMIN — Medication 4 MILLIGRAM(S): at 20:25

## 2022-12-08 RX ADMIN — GABAPENTIN 600 MILLIGRAM(S): 400 CAPSULE ORAL at 20:25

## 2022-12-08 RX ADMIN — Medication 4 MILLIGRAM(S): at 07:05

## 2022-12-08 RX ADMIN — GABAPENTIN 600 MILLIGRAM(S): 400 CAPSULE ORAL at 08:36

## 2022-12-08 RX ADMIN — Medication 1 PATCH: at 08:35

## 2022-12-08 RX ADMIN — GABAPENTIN 600 MILLIGRAM(S): 400 CAPSULE ORAL at 12:22

## 2022-12-08 RX ADMIN — LITHIUM CARBONATE 900 MILLIGRAM(S): 300 TABLET, EXTENDED RELEASE ORAL at 20:25

## 2022-12-08 RX ADMIN — Medication 1 PATCH: at 08:38

## 2022-12-08 NOTE — BH INPATIENT PSYCHIATRY PROGRESS NOTE - MSE UNSTRUCTURED FT
General: initially calm and cooperative, upon learning that her father wants her to be discharged to a residential facility became extremely distraught and sobbed  Appearance: well groomed, well dressed, appears stated age, good eye contact  Speech: normal rate, normal rhythm, normal volume  Motor: normal gait, no tics or other abnormal movements noted  Mood: initially "fine", then very upset  Affect: slightly dysphoric, labile, reactive, unstable, congruent, constricted  Thought process: illogical  Thought content: no delusions, no reported suicidal ideation or homicidal ideation  Perception no hallucinations  Insight: poor  Judgment: fair  Impulse control: fair on this assessment  Cognition: grossly intact

## 2022-12-08 NOTE — BH INPATIENT PSYCHIATRY PROGRESS NOTE - CURRENT MEDICATION
MEDICATIONS  (STANDING):  gabapentin 600 milliGRAM(s) Oral <User Schedule>  lithium CR (ESKALITH-CR) 900 milliGRAM(s) Oral at bedtime  nicotine - 21 mG/24Hr(s) Patch 1 Patch Transdermal daily    MEDICATIONS  (PRN):  haloperidol     Tablet 5 milliGRAM(s) Oral every 6 hours PRN agitation  haloperidol    Injectable 5 milliGRAM(s) IntraMuscular Once PRN agitation  nicotine  Polacrilex Gum 4 milliGRAM(s) Oral every 3 hours PRN nicotine cravings  QUEtiapine 50 milliGRAM(s) Oral at bedtime PRN mood  sodium chloride 0.65% Nasal 1 Spray(s) Both Nostrils three times a day PRN Nasal Congestion

## 2022-12-08 NOTE — BH INPATIENT PSYCHIATRY PROGRESS NOTE - NSBHASSESSSUMMFT_PSY_ALL_CORE
28 y/o single woman.  Patient has PPHx of historical diagnoses of Borderline Personality Disorder, schizoaffective disorder, r/o MDD, Bipolar II Disorder, IED. Patient has 2 prior admissions, last hospitalized at [facility] 2016, and [place] 2018. Patient has hx of drug use (Xanax, cocaine, MJ, opiates, hx of inpatient rehab). Patient is BIB father.  Patient is now hospitalized with a primary problem of worsening depression, agitation, aggression, and emotional dysregulation. Patient admitted to [facility] on a 9.39 legal status, now transferred to  due to covid positive status.     Amenable to continuing current medication regimen, but would like an increase in Gabapentin dosage for anxiety. We will increase Gabapentin dosage to 600 mG 4x per day. Li level 0.8 12/6, no signs of toxicity.  Will continue on this dose.  CASTANEDA Aristada initially on 12/2/22, then received Aristada long acting injectable 1064mg on 12/5/22, well tolerated. Plan for another long acting injectable dose of Aristada in 6 weeks. No reported AH/VH or SI/HI.    Remains with significant anxiety though continues to display good behavioral control and is adherent with treatment recommendations. Would benefit from partial hospitalization due to continued potential risk of harm to self. Will speak to father regarding discharge to residential facility vs. home. Continues to require inpatient hospitalization for safety and stabilization.     Plan:  >Legal: 9.39  >Obs: Routine. no need for CO, patient not expected to pose risk to self or others in controlled inpatient setting  >Continue current regimen of psychiatric medications but with an increase in Gabapentin dosage to 600 mg 4x per day    Medications (standing):  increase to gabapentin 600 milliGRAM(s) Oral QID  lithium CR (ESKALITH-CR) 900 milliGRAM(s) Oral at bedtime  nicotine - 21 mG/24Hr(s) Patch 1 Patch Transdermal daily    MEDICATIONS  (PRN):  haloperidol     Tablet 5 milliGRAM(s) Oral every 6 hours PRN agitation  haloperidol    Injectable 5 milliGRAM(s) IntraMuscular Once PRN agitation  nicotine  Polacrilex Gum 4 milliGRAM(s) Oral every 3 hours PRN nicotine cravings  QUEtiapine 50 milliGRAM(s) Oral at bedtime PRN mood  sodium chloride 0.65% Nasal 1 Spray(s) Both Nostrils three times a day PRN Nasal Congestion    >Labs: LI level 0.8 on 12/6. Admission labs reviewed, no acute findings. utox +methadone. A1C and lipid labs grossly normal, QTc <500  >Medical:   No acute concerns. No consultations needed at this time. No indication for CIWA. Patient with consistently stable VS, no visible physical symptoms of withdrawal.   During the course of treatment, will collaborate with medical team to manage medical issues.  >Diet: Regular  >Social: milieu/structured therapy  >Treatment Interventions: Groups and Individual Therapy/CBT, Motivational counseling for substance abuse related issues. Consider CASTANEDA  >Dispo: Collateral and dispo planning pending further symptom and medication optimization. Awaiting response from [facility] PHP.

## 2022-12-08 NOTE — BH INPATIENT PSYCHIATRY PROGRESS NOTE - NSBHCHARTREVIEWVS_PSY_A_CORE FT
Vital Signs Last 24 Hrs  T(C): 36.6 (12-08-22 @ 06:54), Max: 36.6 (12-08-22 @ 06:54)  T(F): 97.8 (12-08-22 @ 06:54), Max: 97.8 (12-08-22 @ 06:54)  HR: --  BP: --  BP(mean): --  RR: --  SpO2: --    Orthostatic VS  12-08-22 @ 06:54  Lying BP: --/-- HR: --  Sitting BP: 120/63 HR: 79  Standing BP: 118/60 HR: 69  Site: --  Mode: --  Orthostatic VS  12-07-22 @ 08:33  Lying BP: --/-- HR: --  Sitting BP: 112/72 HR: 103  Standing BP: 109/68 HR: 108  Site: --  Mode: --  Orthostatic VS  12-06-22 @ 18:51  Lying BP: --/-- HR: --  Sitting BP: 112/75 HR: 108  Standing BP: 116/80 HR: 109  Site: --  Mode: --

## 2022-12-08 NOTE — BH INPATIENT PSYCHIATRY PROGRESS NOTE - NSBHFUPINTERVALHXFT_PSY_A_CORE
Patient was seen and evaluated for Mood and Psychosis. No acute events overnight, no PRN medications required or received.  Reporting mood is "fine." She stated that increased gabapentin dosage has helped her a lot. Is amenable to taking 800 mg Gabpentin 3x per day instead of 600 mg 4x per day outpatient. She became extremely distraught and began crying upon learning that her father had told social work that he wants her to be discharged to a residential facility instead of going home with him. He sets certain rules for home including about bringing men home that in the past she has not followed. We tried to discuss with her how she could figure out a way to follow the rules that he sets. Patient continues to be amenable to going to partial hospitalization and is tolerating medications well. No reported SI/HI or AH/VH.

## 2022-12-08 NOTE — BH INPATIENT PSYCHIATRY PROGRESS NOTE - NSBHFUPINTERVALCCFT_PSY_A_CORE
"I don't understand why my father thinks I needed to be in the hospital" "I don't understand why my father thinks I need a residence"

## 2022-12-09 PROCEDURE — 99231 SBSQ HOSP IP/OBS SF/LOW 25: CPT

## 2022-12-09 RX ADMIN — LITHIUM CARBONATE 900 MILLIGRAM(S): 300 TABLET, EXTENDED RELEASE ORAL at 21:15

## 2022-12-09 RX ADMIN — Medication 4 MILLIGRAM(S): at 08:25

## 2022-12-09 RX ADMIN — GABAPENTIN 600 MILLIGRAM(S): 400 CAPSULE ORAL at 11:30

## 2022-12-09 RX ADMIN — Medication 4 MILLIGRAM(S): at 18:06

## 2022-12-09 RX ADMIN — GABAPENTIN 600 MILLIGRAM(S): 400 CAPSULE ORAL at 15:16

## 2022-12-09 RX ADMIN — GABAPENTIN 600 MILLIGRAM(S): 400 CAPSULE ORAL at 08:25

## 2022-12-09 RX ADMIN — Medication 4 MILLIGRAM(S): at 14:21

## 2022-12-09 RX ADMIN — Medication 4 MILLIGRAM(S): at 11:34

## 2022-12-09 RX ADMIN — GABAPENTIN 600 MILLIGRAM(S): 400 CAPSULE ORAL at 21:15

## 2022-12-09 RX ADMIN — Medication 1 PATCH: at 08:25

## 2022-12-09 NOTE — BH INPATIENT PSYCHIATRY PROGRESS NOTE - MSE UNSTRUCTURED FT
General: anxious  Appearance: well groomed, well dressed, appears stated age, fair eye contact  Speech: normal rate, normal rhythm, normal volume  Motor: normal gait, no tics or other abnormal movements noted  Mood: upset  Affect: slightly dysphoric, congruent, constricted  Thought process: illogical  Thought content: no delusions, no reported suicidal ideation or homicidal ideation  Perception: no hallucinations  Insight: poor  Judgment: fair  Impulse control: fair on this assessment  Cognition: grossly intact General: anxious  Appearance: well groomed, well dressed, appears stated age, fair eye contact  Speech: normal rate, normal rhythm, normal volume  Motor: normal gait, no tics or other abnormal movements noted  Mood: upset  Affect:  dysphoric, congruent, constricted  Thought process: concrete  Thought content: no delusions, no reported suicidal ideation or homicidal ideation  Perception: no hallucinations  Insight: poor  Judgment: fair  Impulse control: fair on this assessment  Cognition: grossly intact

## 2022-12-09 NOTE — BH INPATIENT PSYCHIATRY PROGRESS NOTE - NSBHASSESSSUMMFT_PSY_ALL_CORE
28 y/o single woman.  Patient has PPHx of historical diagnoses of Borderline Personality Disorder, schizoaffective disorder, r/o MDD, Bipolar II Disorder, IED. Patient has 2 prior admissions, last hospitalized at [facility] 2016, and [place] 2018. Patient has hx of drug use (Xanax, cocaine, MJ, opiates, hx of inpatient rehab). Patient is BIB father.  Patient is now hospitalized with a primary problem of worsening depression, agitation, aggression, and emotional dysregulation. Patient admitted to [facility] on a 9.39 legal status, now transferred to  due to covid positive status.     Amenable to continuing current medication regimen. CASTANEDA Aristada initially on 12/2/22, then received Aristada long acting injectable 1064mg on 12/5/22, well tolerated. Plan for another long acting injectable dose of Aristada in 6 weeks. No reported AH/VH or SI/HI.    Remains with significant anxiety though continues to display good behavioral control and is adherent with treatment recommendations. Would benefit from partial hospitalization due to continued potential risk of harm to self. Will speak to father regarding discharge to home vs. homeless shelter. Continues to require inpatient hospitalization for safety and stabilization.     Plan:  >Legal: 9.39  >Obs: Routine. no need for CO, patient not expected to pose risk to self or others in controlled inpatient setting  >Continue current regimen of psychiatric medications but with an increase in Gabapentin dosage to 600 mg 4x per day    Medications (standing):  increase to gabapentin 600 milliGRAM(s) Oral QID  lithium CR (ESKALITH-CR) 900 milliGRAM(s) Oral at bedtime  nicotine - 21 mG/24Hr(s) Patch 1 Patch Transdermal daily    MEDICATIONS  (PRN):  haloperidol     Tablet 5 milliGRAM(s) Oral every 6 hours PRN agitation  haloperidol    Injectable 5 milliGRAM(s) IntraMuscular Once PRN agitation  nicotine  Polacrilex Gum 4 milliGRAM(s) Oral every 3 hours PRN nicotine cravings  QUEtiapine 50 milliGRAM(s) Oral at bedtime PRN mood  sodium chloride 0.65% Nasal 1 Spray(s) Both Nostrils three times a day PRN Nasal Congestion    >Labs: LI level 0.8 on 12/6. Admission labs reviewed, no acute findings. utox +methadone. A1C and lipid labs grossly normal, QTc <500  >Medical:   No acute concerns. No consultations needed at this time. No indication for CIWA. Patient with consistently stable VS, no visible physical symptoms of withdrawal.   During the course of treatment, will collaborate with medical team to manage medical issues.  >Diet: Regular  >Social: milieu/structured therapy  >Treatment Interventions: Groups and Individual Therapy/CBT, Motivational counseling for substance abuse related issues. Consider CASTANEDA  >Dispo: Collateral and dispo planning pending further symptom and medication optimization. Awaiting response from [facility]. 26 y/o single woman.  Patient has PPHx of historical diagnoses of Borderline Personality Disorder, schizoaffective disorder, r/o MDD, Bipolar II Disorder, IED. Patient has 2 prior admissions, last hospitalized at [facility] 2016, and [place] 2018. Patient has hx of drug use (Xanax, cocaine, MJ, opiates, hx of inpatient rehab). Patient is BIB father.  Patient is now hospitalized with a primary problem of worsening depression, agitation, aggression, and emotional dysregulation. Patient admitted to [facility] on a 9.39 legal status, now transferred to  due to covid positive status.     Amenable to continuing current medication regimen. CASTANEDA Aristada initially on 12/2/22, then received Aristada long acting injectable 1064mg on 12/5/22, well tolerated. Plan for another long acting injectable dose of Aristada in 6 weeks. No reported AH/VH or SI/HI.    Remains with significant anxiety though continues to display good behavioral control and is adherent with treatment recommendations. Would benefit from partial hospitalization due to continued potential risk of harm to self. Will speak to father regarding discharge to home vs. shelter. Discharge pending dispo, complicated by father not accepting patient home at this time.      Plan:  >Legal: 9.39  >Obs: Routine. no need for CO, patient not expected to pose risk to self or others in controlled inpatient setting  >Continue current regimen of psychiatric medications but with an increase in Gabapentin dosage to 600 mg 4x per day    Medications (standing):  increase to gabapentin 600 milliGRAM(s) Oral QID  lithium CR (ESKALITH-CR) 900 milliGRAM(s) Oral at bedtime  nicotine - 21 mG/24Hr(s) Patch 1 Patch Transdermal daily    MEDICATIONS  (PRN):  haloperidol     Tablet 5 milliGRAM(s) Oral every 6 hours PRN agitation  haloperidol    Injectable 5 milliGRAM(s) IntraMuscular Once PRN agitation  nicotine  Polacrilex Gum 4 milliGRAM(s) Oral every 3 hours PRN nicotine cravings  QUEtiapine 50 milliGRAM(s) Oral at bedtime PRN mood  sodium chloride 0.65% Nasal 1 Spray(s) Both Nostrils three times a day PRN Nasal Congestion    >Labs: LI level 0.8 on 12/6. Admission labs reviewed, no acute findings. utox +methadone. A1C and lipid labs grossly normal, QTc <500  >Medical:   No acute concerns. No consultations needed at this time. No indication for CIWA. Patient with consistently stable VS, no visible physical symptoms of withdrawal.   During the course of treatment, will collaborate with medical team to manage medical issues.  >Diet: Regular  >Social: milieu/structured therapy  >Treatment Interventions: Groups and Individual Therapy/CBT, Motivational counseling for substance abuse related issues. Consider CASTANEDA  >Dispo: Collateral and dispo planning pending further symptom and medication optimization. Awaiting response from [facility].

## 2022-12-09 NOTE — BH INPATIENT PSYCHIATRY PROGRESS NOTE - NSBHFUPINTERVALHXFT_PSY_A_CORE
Patient was seen and evaluated for Mood and Psychosis. No acute events overnight, no PRN medications required or received.  Reports that is very upset about her father not wanting to take her home. We told her that if her father does not want to take her home, the only other option would be a homeless shelter. She will speak to her father to see if it will be possible to assuage his fears about bringing her home. Patient continues to be amenable to going to partial hospitalization and is tolerating medications well. No reported SI/HI or AH/VH. Patient was seen and evaluated for Mood and Psychosis. No acute events overnight, no PRN medications required or received.  Reports that is very upset about her father not wanting to take her home. Patient repeatedly states she does not want to enter a residential program but prefers to return to [facility].  We explain return to [facility] is not a dispo plan and she may return but we must still make a dispo plan.  We explain she cannot go to residential except if she is in agreement.  We discuss that if her father will not allow her to return home, she can elect to be discharged to a shelter.  She does not want to be discharged to a shelter.  She is able to explain her understanding of the reasons why her father does not want her to return home.  She denies she has been drinking or using drugs since getting out of last residential program several years ago and states she has not had men over the house in over a year which are his concerns. She is agreeable to a family meeting. Also, She will speak to her father to see if it will be possible to assuage his fears about bringing her home. Patient continues to be amenable to going to partial hospitalization and is tolerating medications well. No reported SI/HI or AH/VH.

## 2022-12-09 NOTE — BH INPATIENT PSYCHIATRY PROGRESS NOTE - NSBHCHARTREVIEWVS_PSY_A_CORE FT
Vital Signs Last 24 Hrs  T(C): 36.7 (12-09-22 @ 08:14), Max: 36.8 (12-08-22 @ 17:45)  T(F): 98.1 (12-09-22 @ 08:14), Max: 98.2 (12-08-22 @ 17:45)  HR: --  BP: --  BP(mean): --  RR: --  SpO2: --    Orthostatic VS  12-09-22 @ 08:14  Lying BP: --/-- HR: --  Sitting BP: 115/76 HR: 84  Standing BP: 116/79 HR: 88  Site: --  Mode: --  Orthostatic VS  12-08-22 @ 19:40  Lying BP: --/-- HR: --  Sitting BP: 114/77 HR: 84  Standing BP: 104/78 HR: 104  Site: --  Mode: --  Orthostatic VS  12-08-22 @ 06:54  Lying BP: --/-- HR: --  Sitting BP: 120/63 HR: 79  Standing BP: 118/60 HR: 69  Site: --  Mode: --

## 2022-12-10 RX ADMIN — GABAPENTIN 600 MILLIGRAM(S): 400 CAPSULE ORAL at 21:45

## 2022-12-10 RX ADMIN — GABAPENTIN 600 MILLIGRAM(S): 400 CAPSULE ORAL at 07:56

## 2022-12-10 RX ADMIN — LITHIUM CARBONATE 900 MILLIGRAM(S): 300 TABLET, EXTENDED RELEASE ORAL at 21:45

## 2022-12-10 RX ADMIN — Medication 4 MILLIGRAM(S): at 07:57

## 2022-12-10 RX ADMIN — GABAPENTIN 600 MILLIGRAM(S): 400 CAPSULE ORAL at 16:12

## 2022-12-10 RX ADMIN — Medication 1 PATCH: at 08:22

## 2022-12-10 RX ADMIN — GABAPENTIN 600 MILLIGRAM(S): 400 CAPSULE ORAL at 12:04

## 2022-12-11 RX ADMIN — GABAPENTIN 600 MILLIGRAM(S): 400 CAPSULE ORAL at 16:46

## 2022-12-11 RX ADMIN — Medication 4 MILLIGRAM(S): at 20:10

## 2022-12-11 RX ADMIN — GABAPENTIN 600 MILLIGRAM(S): 400 CAPSULE ORAL at 20:10

## 2022-12-11 RX ADMIN — Medication 1 PATCH: at 08:49

## 2022-12-11 RX ADMIN — GABAPENTIN 600 MILLIGRAM(S): 400 CAPSULE ORAL at 09:08

## 2022-12-11 RX ADMIN — Medication 1 SPRAY(S): at 20:10

## 2022-12-11 RX ADMIN — Medication 1 PATCH: at 09:35

## 2022-12-11 RX ADMIN — LITHIUM CARBONATE 900 MILLIGRAM(S): 300 TABLET, EXTENDED RELEASE ORAL at 20:10

## 2022-12-11 RX ADMIN — Medication 1 PATCH: at 09:34

## 2022-12-12 PROCEDURE — 99231 SBSQ HOSP IP/OBS SF/LOW 25: CPT

## 2022-12-12 RX ORDER — LANOLIN ALCOHOL/MO/W.PET/CERES
5 CREAM (GRAM) TOPICAL ONCE
Refills: 0 | Status: COMPLETED | OUTPATIENT
Start: 2022-12-12 | End: 2022-12-12

## 2022-12-12 RX ADMIN — LITHIUM CARBONATE 900 MILLIGRAM(S): 300 TABLET, EXTENDED RELEASE ORAL at 20:33

## 2022-12-12 RX ADMIN — GABAPENTIN 600 MILLIGRAM(S): 400 CAPSULE ORAL at 09:05

## 2022-12-12 RX ADMIN — Medication 4 MILLIGRAM(S): at 09:05

## 2022-12-12 RX ADMIN — GABAPENTIN 600 MILLIGRAM(S): 400 CAPSULE ORAL at 17:14

## 2022-12-12 RX ADMIN — Medication 5 MILLIGRAM(S): at 23:37

## 2022-12-12 RX ADMIN — Medication 1 PATCH: at 09:05

## 2022-12-12 RX ADMIN — GABAPENTIN 600 MILLIGRAM(S): 400 CAPSULE ORAL at 20:33

## 2022-12-12 RX ADMIN — GABAPENTIN 600 MILLIGRAM(S): 400 CAPSULE ORAL at 14:01

## 2022-12-12 NOTE — BH INPATIENT PSYCHIATRY PROGRESS NOTE - NSBHFUPINTERVALHXFT_PSY_A_CORE
Patient was seen and evaluated for Mood and Psychosis. No acute events overnight, no PRN medications required or received.  Spoke to her father on the phone about going home vs. going to a shelter; she stated that he wants to do a group phone call with her and the doctor. Appeared visibly upset when discussing discharge location. Patient continues to be amenable to going to partial hospitalization and is tolerating medications well. No reported SI/HI or AH/VH.

## 2022-12-12 NOTE — BH INPATIENT PSYCHIATRY PROGRESS NOTE - NSBHCHARTREVIEWVS_PSY_A_CORE FT
Vital Signs Last 24 Hrs  T(C): 36.6 (12-12-22 @ 06:11), Max: 36.8 (12-11-22 @ 16:22)  T(F): 97.9 (12-12-22 @ 06:11), Max: 98.2 (12-11-22 @ 16:22)  HR: --  BP: --  BP(mean): --  RR: --  SpO2: --    Orthostatic VS  12-12-22 @ 06:11  Lying BP: --/-- HR: --  Sitting BP: 120/76 HR: 83  Standing BP: 123/86 HR: 98  Site: --  Mode: --  Orthostatic VS  12-11-22 @ 16:22  Lying BP: --/-- HR: --  Sitting BP: 108/74 HR: 108  Standing BP: 106/67 HR: 121  Site: --  Mode: --  Orthostatic VS  12-11-22 @ 08:41  Lying BP: --/-- HR: --  Sitting BP: 103/66 HR: 75  Standing BP: 115/90 HR: 97  Site: --  Mode: electronic  Orthostatic VS  12-10-22 @ 18:30  Lying BP: --/-- HR: --  Sitting BP: 115/74 HR: 95  Standing BP: 128/81 HR: 94  Site: --  Mode: --

## 2022-12-12 NOTE — BH INPATIENT PSYCHIATRY PROGRESS NOTE - NSBHASSESSSUMMFT_PSY_ALL_CORE
26 y/o single woman.  Patient has PPHx of historical diagnoses of Borderline Personality Disorder, schizoaffective disorder, r/o MDD, Bipolar II Disorder, IED. Patient has 2 prior admissions, last hospitalized at [facility] 2016, and [place] 2018. Patient has hx of drug use (Xanax, cocaine, MJ, opiates, hx of inpatient rehab). Patient is BIB father.  Patient is now hospitalized with a primary problem of worsening depression, agitation, aggression, and emotional dysregulation. Patient admitted to [facility] on a 9.39 legal status, now transferred to  due to covid positive status.     Amenable to continuing current medication regimen. CASTANEDA Aristada initially on 12/2/22, then received Aristada long acting injectable 1064mg on 12/5/22, well tolerated. Plan for another long acting injectable dose of Aristada in 6 weeks. No reported AH/VH or SI/HI.    Remains with significant anxiety though continues to display good behavioral control and is adherent with treatment recommendations. Would benefit from partial hospitalization due to continued potential risk of harm to self. Will conduct group meeting with her and her father regarding discharge to home vs. shelter. Discharge pending dispo, complicated by father not accepting patient home at this time.      Plan:  >Legal: 9.39  >Obs: Routine. no need for CO, patient not expected to pose risk to self or others in controlled inpatient setting  >Continue current regimen of psychiatric medications but with an increase in Gabapentin dosage to 600 mg 4x per day    Medications (standing):  increase to gabapentin 600 milliGRAM(s) Oral QID  lithium CR (ESKALITH-CR) 900 milliGRAM(s) Oral at bedtime  nicotine - 21 mG/24Hr(s) Patch 1 Patch Transdermal daily    MEDICATIONS  (PRN):  haloperidol     Tablet 5 milliGRAM(s) Oral every 6 hours PRN agitation  haloperidol    Injectable 5 milliGRAM(s) IntraMuscular Once PRN agitation  nicotine  Polacrilex Gum 4 milliGRAM(s) Oral every 3 hours PRN nicotine cravings  QUEtiapine 50 milliGRAM(s) Oral at bedtime PRN mood  sodium chloride 0.65% Nasal 1 Spray(s) Both Nostrils three times a day PRN Nasal Congestion    >Labs: LI level 0.8 on 12/6. Admission labs reviewed, no acute findings. utox +methadone. A1C and lipid labs grossly normal, QTc <500  >Medical:   No acute concerns. No consultations needed at this time. No indication for CIWA. Patient with consistently stable VS, no visible physical symptoms of withdrawal.   During the course of treatment, will collaborate with medical team to manage medical issues.  >Diet: Regular  >Social: milieu/structured therapy  >Treatment Interventions: Groups and Individual Therapy/CBT, Motivational counseling for substance abuse related issues. Consider CASTANEDA  >Dispo: Collateral and dispo planning pending further symptom and medication optimization. Awaiting response from [facility] PHP. 26 y/o single woman.  Patient has PPHx of historical diagnoses of Borderline Personality Disorder, schizoaffective disorder, r/o MDD, Bipolar II Disorder, IED. Patient has 2 prior admissions, last hospitalized at [facility] 2016, and [place] 2018. Patient has hx of drug use (Xanax, cocaine, MJ, opiates, hx of inpatient rehab). Patient is BIB father.  Patient is now hospitalized with a primary problem of worsening depression, agitation, aggression, and emotional dysregulation. Patient admitted to [facility] on a 9.39 legal status, now transferred to  due to covid positive status.     Amenable to continuing current medication regimen. CASTANEDA Aristada initially on 12/2/22, then received Aristada long acting injectable 1064mg on 12/5/22, well tolerated. Plan for another long acting injectable dose of Aristada in 6 weeks. No reported AH/VH or SI/HI.    Remains with significant anxiety though continues to display good behavioral control and is adherent with treatment recommendations. Would benefit from partial hospitalization due to continued potential risk of harm to self. Will conduct group meeting with her and her father regarding discharge to home vs. shelter. Discharge pending dispo, complicated by father not accepting patient home at this time.      Plan:  >Legal: 9.39  >Obs: Routine. no need for CO, patient not expected to pose risk to self or others in controlled inpatient setting  >Continue current regimen of psychiatric medications     Medications (standing):  gabapentin 600 milliGRAM(s) Oral QID standuig  lithium CR (ESKALITH-CR) 900 milliGRAM(s) Oral at bedtime  nicotine - 21 mG/24Hr(s) Patch 1 Patch Transdermal daily  Received Aristada initio followed by Aristada 1064mg on 12/5    MEDICATIONS  (PRN):  haloperidol     Tablet 5 milliGRAM(s) Oral every 6 hours PRN agitation  haloperidol    Injectable 5 milliGRAM(s) IntraMuscular Once PRN agitation  nicotine  Polacrilex Gum 4 milliGRAM(s) Oral every 3 hours PRN nicotine cravings  QUEtiapine 50 milliGRAM(s) Oral at bedtime PRN mood  sodium chloride 0.65% Nasal 1 Spray(s) Both Nostrils three times a day PRN Nasal Congestion    >Labs: L. level 0.8 on 12/6. Admission labs reviewed, no acute findings. utox +methadone. A1C and lipid labs grossly normal, QTc <500  >Medical:   No acute concerns. No consultations needed at this time. No indication for CIWA. Patient with consistently stable VS, no visible physical symptoms of withdrawal.   During the course of treatment, will collaborate with medical team to manage medical issues.  >Diet: Regular  >Social: milieu/structured therapy  >Treatment Interventions: Groups and Individual Therapy/CBT, Motivational counseling for substance abuse related issues.   >Dispo: Collateral and dispo planning pending further symptom and medication optimization. Awaiting response from [facility] PHP.

## 2022-12-12 NOTE — BH INPATIENT PSYCHIATRY PROGRESS NOTE - MSE UNSTRUCTURED FT
General: anxious  Appearance: well groomed, well dressed, appears stated age, poor eye contact  Speech: normal rate, normal rhythm, normal volume  Motor: normal gait, no tics or other abnormal movements noted  Mood: upset  Affect:  dysphoric, congruent, constricted  Thought process: concrete  Thought content: no delusions, no reported suicidal ideation or homicidal ideation  Perception: no hallucinations  Insight: poor  Judgment: fair  Impulse control: fair on this assessment  Cognition: grossly intact General: anxious  Appearance: well groomed, well dressed, appears stated age, poor eye contact  Speech: normal rate, normal rhythm, normal volume  Motor: normal gait, no tics or other abnormal movements noted  Mood: "anxious"  Affect:  anxious, congruent, constricted  Thought process: concrete  Thought content: no delusions, no reported suicidal ideation or homicidal ideation  Perception: no hallucinations  Insight: poor  Judgment: fair  Impulse control: fair on this assessment  Cognition: grossly intact

## 2022-12-13 PROCEDURE — 99231 SBSQ HOSP IP/OBS SF/LOW 25: CPT | Mod: GC

## 2022-12-13 RX ADMIN — Medication 1 SPRAY(S): at 20:19

## 2022-12-13 RX ADMIN — GABAPENTIN 600 MILLIGRAM(S): 400 CAPSULE ORAL at 16:49

## 2022-12-13 RX ADMIN — GABAPENTIN 600 MILLIGRAM(S): 400 CAPSULE ORAL at 08:40

## 2022-12-13 RX ADMIN — Medication 1 PATCH: at 08:40

## 2022-12-13 RX ADMIN — GABAPENTIN 600 MILLIGRAM(S): 400 CAPSULE ORAL at 12:12

## 2022-12-13 RX ADMIN — LITHIUM CARBONATE 900 MILLIGRAM(S): 300 TABLET, EXTENDED RELEASE ORAL at 20:19

## 2022-12-13 RX ADMIN — GABAPENTIN 600 MILLIGRAM(S): 400 CAPSULE ORAL at 20:19

## 2022-12-13 NOTE — BH INPATIENT PSYCHIATRY PROGRESS NOTE - NSBHFUPINTERVALHXFT_PSY_A_CORE
Patient was seen and evaluated for Mood and Psychosis. No acute events overnight, no PRN medications required or received.  Will be doing group phone call with patient, patient's father, doctors, and  this afternoon to discuss conditions for discharge. Patient continues to be amenable to going to partial hospitalization and is tolerating medications well. No reported SI/HI or AH/VH. Patient was seen and evaluated for Mood and Psychosis. No acute events overnight, no PRN medications required or received.  Group phone call with patient, patient's father, doctors, and  this afternoon to discuss conditions for discharge. Patient continues to be amenable to going to partial hospitalization and is tolerating medications well. No reported SI/HI or AH/VH.  Family meeting was held as planned.  Patient tried to rush through many agenda items but was able to tolerate slowing down and discussing each item.  Did not want to read father's proposed consequences for violating rules but agreed to them and said she would voluntarily turn over her phone after any violation.  Discussed med compliance and stopping medication previously when she is feeling and doing well.  Expresses understanding that she cannot do this.  Father agrees to take patient home.  Discharge tomorrow or next day depending on PHP opening.

## 2022-12-13 NOTE — DIETITIAN INITIAL EVALUATION ADULT - PERTINENT LABORATORY DATA
A1C with Estimated Average Glucose Result: 4.6 % (11-14-22 @ 09:22)  A1C with Estimated Average Glucose Result: 4.6 % (11-13-22 @ 08:50)    Lipid Panel: Date/Time: 11-14-22 @ 09:22  Cholesterol, Serum: 221  Direct LDL: --  HDL Cholesterol, Serum: 63  Total Cholesterol/HDL Ration Measurement: --  Triglycerides, Serum: 115

## 2022-12-13 NOTE — BH INPATIENT PSYCHIATRY PROGRESS NOTE - NSBHCHARTREVIEWVS_PSY_A_CORE FT
Vital Signs Last 24 Hrs  T(C): 35.9 (12-13-22 @ 08:41), Max: 36.1 (12-12-22 @ 17:29)  T(F): 96.7 (12-13-22 @ 08:41), Max: 97 (12-12-22 @ 17:29)  HR: --  BP: --  BP(mean): --  RR: --  SpO2: --    Orthostatic VS  12-13-22 @ 08:41  Lying BP: --/-- HR: --  Sitting BP: 104/70 HR: 85  Standing BP: 90/50 HR: 94  Site: --  Mode: --  Orthostatic VS  12-12-22 @ 18:48  Lying BP: --/-- HR: --  Sitting BP: 118/66 HR: 109  Standing BP: 98/74 HR: 131  Site: --  Mode: --  Orthostatic VS  12-12-22 @ 06:11  Lying BP: --/-- HR: --  Sitting BP: 120/76 HR: 83  Standing BP: 123/86 HR: 98  Site: --  Mode: --  Orthostatic VS  12-11-22 @ 16:22  Lying BP: --/-- HR: --  Sitting BP: 108/74 HR: 108  Standing BP: 106/67 HR: 121  Site: --  Mode: --   Vital Signs Last 24 Hrs  T(C): 35.9 (12-13-22 @ 08:41), Max: 36.1 (12-12-22 @ 17:29)  T(F): 96.7 (12-13-22 @ 08:41), Max: 97 (12-12-22 @ 17:29)  HR: --  BP: --  BP(mean): --  RR: --  SpO2: --    Orthostatic VS  12-13-22 @ 08:41  Lying BP: --/-- HR: --  Sitting BP: 104/70 HR: 85  Standing BP: 90/50 HR: 94  Site: --  Mode: --  Orthostatic VS  12-12-22 @ 18:48  Lying BP: --/-- HR: --  Sitting BP: 118/66 HR: 109  Standing BP: 98/74 HR: 131  Site: --  Mode: --  Orthostatic VS  12-12-22 @ 06:11  Lying BP: --/-- HR: --  Sitting BP: 120/76 HR: 83  Standing BP: 123/86 HR: 98  Site: --  Mode: --

## 2022-12-13 NOTE — BH DISCHARGE NOTE NURSING/SOCIAL WORK/PSYCH REHAB - NSDCPRGOAL_PSY_ALL_CORE
Writer met with patient to review her safety plan and discuss her progress throughout her inpatient stay. Patient completed her safety plan during the prior week, identifying various warning signs, internal coping skills, and protective factors. Patient reported utilizing coping skills in the unit to manage anxiety with positive effect. Patient endorsed improvement in mood, sleep, and appetite. Since admission, patient attended approximately 90% of psychiatric rehabilitation groups in the unit including: creative arts therapy, psychoeducation, peer advocate, and leisure. Patient became increasingly verbal and engaged during groups. Patient is moderately visible in the milieu and became increasingly social with select peers in the unit. Patient has been adherent to all medication. Patient presented with improved insight to her symptoms, however at times struggles to understand directions/reasoning. Psychiatric rehabilitation team encourages patient to continue treatment in outpatient services for further symptom management and coping skills development. Due to Covid-19 pandemic, unit structure and activities were reevaluated on a consistent basis in effort to maintain safety of patients and staff.

## 2022-12-13 NOTE — BH DISCHARGE NOTE NURSING/SOCIAL WORK/PSYCH REHAB - NSDCPRRECOMMEND_PSY_ALL_CORE
Psychiatric Rehabilitation recommends patient to continue treatment in outpatient services for symptom management and coping skills development. Team encourages patient continue utilizing coping skills that she acquired during the stay.

## 2022-12-13 NOTE — BH INPATIENT PSYCHIATRY PROGRESS NOTE - MSE UNSTRUCTURED FT
General: anxious  Appearance: well groomed, well dressed, appears stated age, poor eye contact  Speech: normal rate, normal rhythm, normal volume  Motor: normal gait, no tics or other abnormal movements noted  Mood: "fine"  Affect:  anxious, slightly paranoid, incongruent, constricted  Thought process: concrete  Thought content: no delusions, no reported suicidal ideation or homicidal ideation  Perception: no hallucinations  Insight: poor  Judgment: fair  Impulse control: fair on this assessment  Cognition: grossly intact General: anxious  Appearance: well groomed, well dressed, appears stated age, poor eye contact  Speech: normal rate, normal rhythm, normal volume  Motor: normal gait, no tics or other abnormal movements noted  Mood: "fine"  Affect:  anxious, incongruent, constricted  Thought process: concrete  Thought content: no delusions, no reported suicidal ideation or homicidal ideation  Perception: no hallucinations  Insight: poor  Judgment: fair  Impulse control: fair on this assessment  Cognition: grossly intact

## 2022-12-13 NOTE — DIETITIAN INITIAL EVALUATION ADULT - OTHER INFO
Patient is a 28 y/o female with PPHx of Borderline Personality Disorder, schizoaffective disorder, Bipolar II Disorder, IED, 2 prior admissions ([facility] 2016, and [place] 2018), hx of drug use (Xanax, cocaine, MJ, opiates, hx of inpatient rehab). Patient is BIB father for a primary problem of worsening depression, agitation, aggression, and emotional dysregulation. Patient admitted to [facility] on a 9.39 legal status, transferred to  due to covid positive status.     Met with patient in the dining area. Patient reports her appetite has been good with good PO intake on in-house meals, denies chewing/swallowing difficulties on current diet. Case d/w RN who confirmed patient has been eating well. NKFA reported. Patient denies GI distress (nausea/vomiting/diarrhea/constipation). Noted patient with elevated cholesterol and LDL, writer discussed with patient on heart healthy diet and portion size control, as well as encouraged fruits and vegetables intake; however, patient exhibited partial interest in topics and declined My[employer]ate handouts offered. Food preferences taken and implemented to incorporate more fruits and vegetables in her diet. Patient has no questions or concerns about her diet.     Pt's height 162.6cm, current wt: 187lb (12/11), 185lb (11/19), 229.2lb (11/12) -- ? wt loss of 44.2lb in 1 week vs wt accuracy, wt appeared stable from 11/19-12/11, will monitor wt trend. Patient unable to recall her UBW/recent wt status PTA due to she does not wt herself at home.

## 2022-12-13 NOTE — BH DISCHARGE NOTE NURSING/SOCIAL WORK/PSYCH REHAB - NSBHDCAPPT1DT_PSY_A_CORE
Patient referred by Dr. Rian Morris and note will be sent over.  Found to have abnormal LFT Drinks a bottle a wine per day for 25 years.  Had a triple bypass.  No abdominal pain. Had a hernia repair.  No CIBH or bleeding.  Last colon 3 years ago and had a polyp. Told not to repeat.  Has intermittent reflux and uses PPI once a day. 16-Dec-2022 08:00 15-Dec-2022 08:00

## 2022-12-13 NOTE — BH INPATIENT PSYCHIATRY PROGRESS NOTE - NSBHASSESSSUMMFT_PSY_ALL_CORE
26 y/o single woman.  Patient has PPHx of historical diagnoses of Borderline Personality Disorder, schizoaffective disorder, r/o MDD, Bipolar II Disorder, IED. Patient has 2 prior admissions, last hospitalized at [facility] 2016, and [place] 2018. Patient has hx of drug use (Xanax, cocaine, MJ, opiates, hx of inpatient rehab). Patient is BIB father.  Patient is now hospitalized with a primary problem of worsening depression, agitation, aggression, and emotional dysregulation. Patient admitted to [facility] on a 9.39 legal status, now transferred to  due to covid positive status.     Amenable to continuing current medication regimen. CASTANEDA Aristada initially on 12/2/22, then received Aristada long acting injectable 1064mg on 12/5/22, well tolerated. Plan for another long acting injectable dose of Aristada in 6 weeks. No reported AH/VH or SI/HI.    Remains with significant anxiety though continues to display good behavioral control and is adherent with treatment recommendations. Would benefit from partial hospitalization due to continued potential risk of harm to self. Will conduct group meeting with her and her father regarding discharge to home vs. shelter. Discharge pending dispo, complicated by father not accepting patient home at this time.      Plan:  >Legal: 9.39  >Obs: Routine. no need for CO, patient not expected to pose risk to self or others in controlled inpatient setting  >Continue current regimen of psychiatric medications     Medications (standing):  gabapentin 600 milliGRAM(s) Oral QID standuig  lithium CR (ESKALITH-CR) 900 milliGRAM(s) Oral at bedtime  nicotine - 21 mG/24Hr(s) Patch 1 Patch Transdermal daily  Received Aristada initio followed by Aristada 1064mg on 12/5    MEDICATIONS  (PRN):  haloperidol     Tablet 5 milliGRAM(s) Oral every 6 hours PRN agitation  haloperidol    Injectable 5 milliGRAM(s) IntraMuscular Once PRN agitation  nicotine  Polacrilex Gum 4 milliGRAM(s) Oral every 3 hours PRN nicotine cravings  QUEtiapine 50 milliGRAM(s) Oral at bedtime PRN mood  sodium chloride 0.65% Nasal 1 Spray(s) Both Nostrils three times a day PRN Nasal Congestion    >Labs: L. level 0.8 on 12/6. Admission labs reviewed, no acute findings. utox +methadone. A1C and lipid labs grossly normal, QTc <500  >Medical:   No acute concerns. No consultations needed at this time. No indication for CIWA. Patient with consistently stable VS, no visible physical symptoms of withdrawal.   During the course of treatment, will collaborate with medical team to manage medical issues.  >Diet: Regular  >Social: milieu/structured therapy  >Treatment Interventions: Groups and Individual Therapy/CBT, Motivational counseling for substance abuse related issues.   >Dispo: Collateral and dispo planning pending further symptom and medication optimization. Awaiting response from [facility] PHP. 28 y/o single woman.  Patient has PPHx of historical diagnoses of Borderline Personality Disorder, schizoaffective disorder, r/o MDD, Bipolar II Disorder, IED. Patient has 2 prior admissions, last hospitalized at [facility] 2016, and [place] 2018. Patient has hx of drug use (Xanax, cocaine, MJ, opiates, hx of inpatient rehab). Patient is BIB father.  Patient is now hospitalized with a primary problem of worsening depression, agitation, aggression, and emotional dysregulation. Patient admitted to [facility] on a 9.39 legal status, now transferred to  due to covid positive status.     Amenable to continuing current medication regimen. CASTANEDA Aristada initially on 12/2/22, then received Aristada long acting injectable 1064mg on 12/5/22, well tolerated. Plan for another long acting injectable dose of Aristada in 4-6 weeks. No reported AH/VH or SI/HI.    Remains with significant anxiety though continues to display good behavioral control and is adherent with treatment recommendations. Would benefit from partial hospitalization due to continued potential risk of harm to self. Will conduct group meeting with her and her father regarding discharge to home vs. shelter. Discharge pending dispo, complicated by father not accepting patient home at this time.      Plan:  >Legal: 9.39  >Obs: Routine. no need for CO, patient not expected to pose risk to self or others in controlled inpatient setting  >Continue current regimen of psychiatric medications     Medications (standing):  gabapentin 600 milliGRAM(s) Oral QID standuig  lithium CR (ESKALITH-CR) 900 milliGRAM(s) Oral at bedtime  nicotine - 21 mG/24Hr(s) Patch 1 Patch Transdermal daily  Received Aristada initio followed by Aristada 1064mg on 12/5    MEDICATIONS  (PRN):  haloperidol     Tablet 5 milliGRAM(s) Oral every 6 hours PRN agitation  haloperidol    Injectable 5 milliGRAM(s) IntraMuscular Once PRN agitation  nicotine  Polacrilex Gum 4 milliGRAM(s) Oral every 3 hours PRN nicotine cravings  QUEtiapine 50 milliGRAM(s) Oral at bedtime PRN mood  sodium chloride 0.65% Nasal 1 Spray(s) Both Nostrils three times a day PRN Nasal Congestion    >Labs: L. level 0.8 on 12/6. Admission labs reviewed, no acute findings. utox +methadone. A1C and lipid labs grossly normal, QTc <500  >Medical:   No acute concerns. No consultations needed at this time. No indication for CIWA. Patient with consistently stable VS, no visible physical symptoms of withdrawal.   During the course of treatment, will collaborate with medical team to manage medical issues.  >Diet: Regular  >Social: milieu/structured therapy  >Treatment Interventions: Groups and Individual Therapy/CBT, Motivational counseling for substance abuse related issues.   >Dispo: Collateral and dispo planning pending further symptom and medication optimization. Awaiting response from [facility] PHP.

## 2022-12-13 NOTE — BH DISCHARGE NOTE NURSING/SOCIAL WORK/PSYCH REHAB - NSBHDCADDR1FT_A_CORE
[facility], [place] [address] [address] [place] [id number]. The program is in-person on 12/15 from 8 AM to 11AM. You will be told whether you will continue with partial hospital virtually at first or if you can continue in-person without waiting. When you attend in person, you will be provided round-trip transportation.

## 2022-12-13 NOTE — BH DISCHARGE NOTE NURSING/SOCIAL WORK/PSYCH REHAB - NSCDUDCCRISIS_PSY_A_CORE
.  [facility] – Crisis Care for Children, Adults and Families  [address] [place] [address]  Mobile Crisis Hotline – (544) 307-2967/.National Suicide Prevention Lifeline 3 (599) 741-2289/.  [facility] Response Crisis Hotline  (852) 504-1054  24 hour telephone crisis intervention and suicide prevention hotline concerned with all mental health issues

## 2022-12-13 NOTE — BH DISCHARGE NOTE NURSING/SOCIAL WORK/PSYCH REHAB - DISCHARGE INSTRUCTIONS AFTERCARE APPOINTMENTS
In order to check the location, date, or time of your aftercare appointment, please refer to your Discharge Instructions Document given to you upon leaving the hospital.  If you have lost the instructions please call [phone]

## 2022-12-13 NOTE — BH DISCHARGE NOTE NURSING/SOCIAL WORK/PSYCH REHAB - PATIENT PORTAL LINK FT
You can access the FollowMyHealth Patient Portal offered by [facility] by registering at the following website: [email or web address]. By joining [facility] FollowMyHealth portal, you will also be able to view your health information using other applications (apps) compatible with our system.

## 2022-12-14 VITALS — TEMPERATURE: 97 F

## 2022-12-14 PROCEDURE — 99239 HOSP IP/OBS DSCHRG MGMT >30: CPT

## 2022-12-14 RX ORDER — GABAPENTIN 400 MG/1
2 CAPSULE ORAL
Qty: 112 | Refills: 0
Start: 2022-12-14 | End: 2022-12-27

## 2022-12-14 RX ORDER — ARIPIPRAZOLE 15 MG/1
1 TABLET ORAL
Qty: 0 | Refills: 0 | DISCHARGE

## 2022-12-14 RX ORDER — NICOTINE POLACRILEX 2 MG
1 GUM BUCCAL
Qty: 14 | Refills: 0
Start: 2022-12-14 | End: 2022-12-27

## 2022-12-14 RX ORDER — LITHIUM CARBONATE 300 MG/1
2 TABLET, EXTENDED RELEASE ORAL
Qty: 28 | Refills: 0
Start: 2022-12-14 | End: 2022-12-27

## 2022-12-14 RX ORDER — QUETIAPINE FUMARATE 200 MG/1
1 TABLET, FILM COATED ORAL
Qty: 14 | Refills: 0
Start: 2022-12-14 | End: 2022-12-27

## 2022-12-14 RX ORDER — TRAZODONE HCL 50 MG
0.5 TABLET ORAL
Qty: 7 | Refills: 0
Start: 2022-12-14 | End: 2022-12-27

## 2022-12-14 RX ADMIN — GABAPENTIN 600 MILLIGRAM(S): 400 CAPSULE ORAL at 12:18

## 2022-12-14 RX ADMIN — GABAPENTIN 600 MILLIGRAM(S): 400 CAPSULE ORAL at 09:12

## 2022-12-14 RX ADMIN — Medication 1 PATCH: at 09:12

## 2022-12-14 NOTE — BH INPATIENT PSYCHIATRY PROGRESS NOTE - NSBHMETABOLIC_PSY_ALL_CORE_FT
BMI:   HbA1c: A1C with Estimated Average Glucose Result: 4.6 % (11-14-22 @ 09:22)    Glucose:   BP: --  Lipid Panel: Date/Time: 11-14-22 @ 09:22  Cholesterol, Serum: 221  Direct LDL: --  HDL Cholesterol, Serum: 63  Total Cholesterol/HDL Ration Measurement: --  Triglycerides, Serum: 115  
BMI:   HbA1c: A1C with Estimated Average Glucose Result: 4.6 % (11-14-22 @ 09:22)    Glucose:   BP: 126/84 (11-15-22 @ 07:54) (119/73 - 126/84)  Lipid Panel: Date/Time: 11-14-22 @ 09:22  Cholesterol, Serum: 221  Direct LDL: --  HDL Cholesterol, Serum: 63  Total Cholesterol/HDL Ration Measurement: --  Triglycerides, Serum: 115  
BMI:   HbA1c: A1C with Estimated Average Glucose Result: 4.6 % (11-14-22 @ 09:22)    Glucose:   BP: 118/84 (12-02-22 @ 21:35) (118/84 - 118/84)  Lipid Panel: Date/Time: 11-14-22 @ 09:22  Cholesterol, Serum: 221  Direct LDL: --  HDL Cholesterol, Serum: 63  Total Cholesterol/HDL Ration Measurement: --  Triglycerides, Serum: 115  
BMI:   HbA1c: A1C with Estimated Average Glucose Result: 4.6 % (11-14-22 @ 09:22)    Glucose:   BP: --  Lipid Panel: Date/Time: 11-14-22 @ 09:22  Cholesterol, Serum: 221  Direct LDL: --  HDL Cholesterol, Serum: 63  Total Cholesterol/HDL Ration Measurement: --  Triglycerides, Serum: 115  
BMI:   HbA1c: A1C with Estimated Average Glucose Result: 4.6 % (11-14-22 @ 09:22)    Glucose:   BP: 119/73 (11-14-22 @ 06:31) (104/64 - 138/82)  Lipid Panel: Date/Time: 11-14-22 @ 09:22  Cholesterol, Serum: 221  Direct LDL: --  HDL Cholesterol, Serum: 63  Total Cholesterol/HDL Ration Measurement: --  Triglycerides, Serum: 115  
BMI:   HbA1c: A1C with Estimated Average Glucose Result: 4.6 % (11-14-22 @ 09:22)    Glucose:   BP: 113/82 (11-21-22 @ 06:48) (113/82 - 113/82)  Lipid Panel: Date/Time: 11-14-22 @ 09:22  Cholesterol, Serum: 221  Direct LDL: --  HDL Cholesterol, Serum: 63  Total Cholesterol/HDL Ration Measurement: --  Triglycerides, Serum: 115  
BMI:   HbA1c: A1C with Estimated Average Glucose Result: 4.6 % (11-14-22 @ 09:22)    Glucose:   BP: 121/90 (11-18-22 @ 07:20) (121/90 - 121/90)  Lipid Panel: Date/Time: 11-14-22 @ 09:22  Cholesterol, Serum: 221  Direct LDL: --  HDL Cholesterol, Serum: 63  Total Cholesterol/HDL Ration Measurement: --  Triglycerides, Serum: 115  
BMI:   HbA1c: A1C with Estimated Average Glucose Result: 4.6 % (11-14-22 @ 09:22)    Glucose:   BP: --  Lipid Panel: Date/Time: 11-14-22 @ 09:22  Cholesterol, Serum: 221  Direct LDL: --  HDL Cholesterol, Serum: 63  Total Cholesterol/HDL Ration Measurement: --  Triglycerides, Serum: 115  
BMI:   HbA1c: A1C with Estimated Average Glucose Result: 4.6 % (11-14-22 @ 09:22)    Glucose:   BP: 113/82 (11-21-22 @ 06:48) (113/82 - 113/82)  Lipid Panel: Date/Time: 11-14-22 @ 09:22  Cholesterol, Serum: 221  Direct LDL: --  HDL Cholesterol, Serum: 63  Total Cholesterol/HDL Ration Measurement: --  Triglycerides, Serum: 115  
BMI:   HbA1c: A1C with Estimated Average Glucose Result: 4.6 % (11-14-22 @ 09:22)    Glucose:   BP: --  Lipid Panel: Date/Time: 11-14-22 @ 09:22  Cholesterol, Serum: 221  Direct LDL: --  HDL Cholesterol, Serum: 63  Total Cholesterol/HDL Ration Measurement: --  Triglycerides, Serum: 115  
BMI:   HbA1c: A1C with Estimated Average Glucose Result: 4.6 % (11-14-22 @ 09:22)    Glucose:   BP: 126/84 (11-15-22 @ 07:54) (126/84 - 126/84)  Lipid Panel: Date/Time: 11-14-22 @ 09:22  Cholesterol, Serum: 221  Direct LDL: --  HDL Cholesterol, Serum: 63  Total Cholesterol/HDL Ration Measurement: --  Triglycerides, Serum: 115  
BMI:   HbA1c: A1C with Estimated Average Glucose Result: 4.6 % (11-14-22 @ 09:22)    Glucose:   BP: --  Lipid Panel: Date/Time: 11-14-22 @ 09:22  Cholesterol, Serum: 221  Direct LDL: --  HDL Cholesterol, Serum: 63  Total Cholesterol/HDL Ration Measurement: --  Triglycerides, Serum: 115  

## 2022-12-14 NOTE — BH INPATIENT PSYCHIATRY PROGRESS NOTE - NSBHROSSYSTEMS_PSY_ALL_CORE
Psychiatric

## 2022-12-14 NOTE — BH INPATIENT PSYCHIATRY PROGRESS NOTE - NSTXPROBCOPE_PSY_ALL_CORE
COPING, INEFFECTIVE

## 2022-12-14 NOTE — BH INPATIENT PSYCHIATRY PROGRESS NOTE - NSTXPROBNEURO_PSY_ALL_CORE
NEUROCOGNITIVE DYSFUNCTION

## 2022-12-14 NOTE — BH INPATIENT PSYCHIATRY PROGRESS NOTE - NSTXDEPRESDATETRGT_PSY_ALL_CORE
06-Dec-2022
06-Dec-2022
14-Dec-2022
14-Dec-2022
30-Nov-2022
30-Nov-2022
14-Dec-2022
06-Dec-2022
30-Nov-2022
14-Dec-2022
06-Dec-2022
14-Dec-2022
30-Nov-2022
14-Dec-2022
06-Dec-2022
30-Nov-2022
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
## 2022-12-14 NOTE — BH INPATIENT PSYCHIATRY DISCHARGE NOTE - NSDCMRMEDTOKEN_GEN_ALL_CORE_FT
Aristada 1064 mg/3.9 mL intramuscular suspension, extended release: 1 dose(s) intramuscular every 28 days, next due 1/2/23  gabapentin 300 mg oral capsule: 2 cap(s) orally 4 times a day, As Needed   lithium 450 mg oral tablet, extended release: 2 tab(s) orally once a day (at bedtime)  nicotine 14 mg/24 hr transdermal film, extended release: 1 patch transdermal once a day, As Needed  QUEtiapine 50 mg oral tablet: 1 tab(s) orally once a day (at bedtime), As needed, insomnia  traZODone 50 mg oral tablet: 0.5 tab(s) orally once a day (at bedtime), As Needed -for insomnia

## 2022-12-14 NOTE — BH INPATIENT PSYCHIATRY PROGRESS NOTE - NSBHFUPINTERVALHXFT_PSY_A_CORE
Patient was seen and evaluated for Mood and Psychosis. No acute events overnight, no PRN medications required or received. Patient continues to be amenable to going to partial hospitalization and is tolerating medications well. Was asked about how she felt about family meeting that took place yesterday, said "I don't know." When asked how she was feeling today, stated "I don't know how to answer your question." Was not able to discuss feelings regarding family meeting. No reported SI/HI or AH/VH. Discharge tomorrow or next day depending on PHP opening.   Patient was seen and evaluated for Mood and Psychosis. No acute events overnight, no PRN medications required or received. Patient continues to be amenable to going to partial hospitalization and is tolerating medications well. Was asked about how she felt about family meeting that took place yesterday, said "I don't know." When asked how she was feeling today, stated "I don't know how to answer your question." Was not able to discuss feelings regarding family meeting. No reported SI/HI or AH/VH. Patient pressed on sharing some feelings about the meeting but she is unwilling.  We discuss plans for discharge and adherence to treatment plan which patient can commit to.  She does feel like medication makes a difference in her behavior and denies side effects that would contribute to nonadherence.

## 2022-12-14 NOTE — BH INPATIENT PSYCHIATRY PROGRESS NOTE - MSE OPTIONS
Unstructured MSE
Unstructured MSE
Structured MSE
Unstructured MSE
Structured MSE
Unstructured MSE
Structured MSE
Unstructured MSE
Structured MSE
Unstructured MSE
Unstructured MSE
Structured MSE

## 2022-12-14 NOTE — BH INPATIENT PSYCHIATRY PROGRESS NOTE - NSTXPROBDISORG_PSY_ALL_CORE
DISORGANIZATION OF THOUGHT/BEHAVIOR

## 2022-12-14 NOTE — BH INPATIENT PSYCHIATRY PROGRESS NOTE - NSBHMETABOLICLABS_PSY_ALL_CORE
All labs not within last 12 months, ordered

## 2022-12-14 NOTE — BH INPATIENT PSYCHIATRY PROGRESS NOTE - NSTXDCOTHRINTERMD_PSY_ALL_CORE
Psychopharm with goal of mood stabilization+CASTANEDA and supportive therapy with appropriate aftercare/possible AOPD CASTANEDA clinic referral
Psychopharm with goal of mood stabilization+CASTANEDA and supportive therapy with appropriate aftercare/possible AOPD CATSANEDA clinic referral
Psychopharm with goal of mood stabilization+CASTANEDA and supportive therapy with appropriate aftercare/possible AOPD CASTANEDA clinic referral

## 2022-12-14 NOTE — BH INPATIENT PSYCHIATRY PROGRESS NOTE - NSTXDCOTHRPROGRES_PSY_ALL_CORE
Improving
No Change
Improving
Improving
No Change
Improving
Improving
No Change
No Change
Improving
No Change
Improving
No Change
Improving
No Change
Improving
No Change
No Change
Improving
No Change

## 2022-12-14 NOTE — BH INPATIENT PSYCHIATRY PROGRESS NOTE - NSTXVIOLNTGOAL_PSY_ALL_CORE
Will identify thoughts/feelings/behaviors prior to loss of control
Will identify 2 situations that cause an aggressive response
Will identify thoughts/feelings/behaviors prior to loss of control
Will communicate an angry feeling in a controlled manner
Will communicate an angry feeling in a controlled manner
Will identify thoughts/feelings/behaviors prior to loss of control
Will identify 2 situations that cause an aggressive response
Will identify thoughts/feelings/behaviors prior to loss of control
Will identify thoughts/feelings/behaviors prior to loss of control
Will communicate an angry feeling in a controlled manner
Will identify 2 situations that cause an aggressive response
Will identify 2 situations that cause an aggressive response
Will identify thoughts/feelings/behaviors prior to loss of control
Will identify thoughts/feelings/behaviors prior to loss of control
Will communicate an angry feeling in a controlled manner
Will identify thoughts/feelings/behaviors prior to loss of control
Will communicate an angry feeling in a controlled manner
Will identify 2 situations that cause an aggressive response
Will communicate an angry feeling in a controlled manner
Will identify thoughts/feelings/behaviors prior to loss of control

## 2022-12-14 NOTE — BH INPATIENT PSYCHIATRY PROGRESS NOTE - NSTXNEGATDATETRGT_PSY_ALL_CORE
14-Dec-2022
14-Dec-2022
06-Dec-2022
06-Dec-2022
14-Dec-2022
06-Dec-2022
14-Dec-2022
14-Dec-2022
06-Dec-2022
14-Dec-2022
06-Dec-2022

## 2022-12-14 NOTE — BH INPATIENT PSYCHIATRY PROGRESS NOTE - NSTXDCOTHRDATETRGT_PSY_ALL_CORE
21-Nov-2022
29-Nov-2022
29-Nov-2022
07-Dec-2022
09-Dec-2022
29-Nov-2022
29-Nov-2022
21-Nov-2022
21-Nov-2022
15-Dec-2022
21-Nov-2022
21-Nov-2022
09-Dec-2022
09-Dec-2022
21-Nov-2022
29-Nov-2022
09-Dec-2022
29-Nov-2022

## 2022-12-14 NOTE — BH INPATIENT PSYCHIATRY PROGRESS NOTE - NSTXCOPEDATEEST_PSY_ALL_CORE
13-Nov-2022
07-Dec-2022
12-Nov-2022
13-Nov-2022
12-Nov-2022
07-Dec-2022
07-Dec-2022
13-Nov-2022
07-Dec-2022
13-Nov-2022
07-Dec-2022
13-Nov-2022
07-Dec-2022
13-Nov-2022
12-Nov-2022
13-Nov-2022

## 2022-12-14 NOTE — BH INPATIENT PSYCHIATRY PROGRESS NOTE - NSTXDISORGPROGRES_PSY_ALL_CORE
No Change
Improving
No Change
Improving
No Change
Improving
No Change
Improving
Improving
No Change

## 2022-12-14 NOTE — BH INPATIENT PSYCHIATRY PROGRESS NOTE - NSTXMEDICPROGRES_PSY_ALL_CORE
Improving
No Change
Improving
No Change
Improving
No Change
Improving
No Change
Improving
Improving
No Change
Improving
No Change
No Change
Improving

## 2022-12-14 NOTE — BH INPATIENT PSYCHIATRY PROGRESS NOTE - NSTXDISORGGOAL_PSY_ALL_CORE
Will make at least 3 goal and reality oriented statements during therapy
Will identify 2 coping skills that assist in organizing
Will make at least 3 goal and reality oriented statements during therapy
Will identify 2 coping skills that assist in organizing
Will make at least 3 goal and reality oriented statements during therapy
Will identify 2 coping skills that assist in organizing
Will make at least 3 goal and reality oriented statements during therapy
Will identify 2 coping skills that assist in organizing
Will identify 2 coping skills that assist in organizing
Will make at least 3 goal and reality oriented statements during therapy
Will identify 2 coping skills that assist in organizing
Will make at least 3 goal and reality oriented statements during therapy
Will identify 2 coping skills that assist in organizing
Will make at least 3 goal and reality oriented statements during therapy
Will identify 2 coping skills that assist in organizing
Will make at least 3 goal and reality oriented statements during therapy
Will identify 2 coping skills that assist in organizing
Will make at least 3 goal and reality oriented statements during therapy

## 2022-12-14 NOTE — BH INPATIENT PSYCHIATRY PROGRESS NOTE - NSTXNEUROGOAL_PSY_ALL_CORE
Will tolerate 15 minutes of group without agitation

## 2022-12-14 NOTE — BH INPATIENT PSYCHIATRY PROGRESS NOTE - NSBHATTESTTYPESTAFF_PSY_A_CORE
Resident/Student
Resident/Student
Resident
Student
Resident/Student
Resident/Student
Student
Resident
Student

## 2022-12-14 NOTE — BH INPATIENT PSYCHIATRY DISCHARGE NOTE - NSBHMETABOLIC_PSY_ALL_CORE_FT
[facility]  [address],[facility] [id number]  Phone: [phone]  Fax: [phone]    Lori Carter MD        November 13, 2017     Patient: Andrés Calixto   YOB: 1942   Date of Visit: 11/13/2017       To Whom it May Concern:    Martín Weldon was seen in my clinic on 11/13/2017. He may return to work on 11/14/17. Please excuse him for today due to his appointment. If you have any questions or concerns, please don't hesitate to call.     Sincerely,         Lori Carter MD BMI:   HbA1c: A1C with Estimated Average Glucose Result: 4.6 % (11-14-22 @ 09:22)    Glucose:   BP: --  Lipid Panel: Date/Time: 11-14-22 @ 09:22  Cholesterol, Serum: 221  Direct LDL: --  HDL Cholesterol, Serum: 63  Total Cholesterol/HDL Ration Measurement: --  Triglycerides, Serum: 115

## 2022-12-14 NOTE — BH INPATIENT PSYCHIATRY PROGRESS NOTE - NSBHANTIPSYCHOTIC_PSY_ALL_CORE
Yes...

## 2022-12-14 NOTE — BH INPATIENT PSYCHIATRY DISCHARGE NOTE - NSBHDCRISKMITIGATE_PSY_ALL_CORE
Safety planning/Referral to PHP/Family/Other social support involvement/Long acting injectable medication

## 2022-12-14 NOTE — BH INPATIENT PSYCHIATRY PROGRESS NOTE - NSTXPSYCHODATEEST_PSY_ALL_CORE
12-Nov-2022
12-Nov-2022
07-Dec-2022
12-Nov-2022
07-Dec-2022
12-Nov-2022
07-Dec-2022
12-Nov-2022
07-Dec-2022

## 2022-12-14 NOTE — BH INPATIENT PSYCHIATRY PROGRESS NOTE - NSBHATTESTCOMMENTATTENDFT_PSY_A_CORE
28 y/o single woman.  Patient has PPHx of historical diagnoses of Borderline Personality Disorder, schizoaffective disorder, r/o MDD, Bipolar II Disorder, IED. Patient has 2 prior admissions, last hospitalized at [facility] 2016, and [place] 2018. Patient has hx of drug use (Xanax, cocaine, MJ, opiates, hx of inpatient rehab). Patient is BIB father.  Patient is now hospitalized with a primary problem of worsening depression, agitation, aggression, and emotional dysregulation    12/8 Update  Patient tearful and labile with irritability when discussion of plans included her father's refusing to allow patient to return home at this time   Tolerated CASTANEDA of Aristada and feels improvement from increase Gabapentin   Very discharge focused and angry at Team and father
As above.  Aristada initio today.  Awaiting PHP start. Discussed with father today. 
28 y/o single woman.  Patient has PPHx of historical diagnoses of Borderline Personality Disorder, schizoaffective disorder, r/o MDD, Bipolar II Disorder, IED. Patient has 2 prior admissions, last hospitalized at [facility] 2016, and [place] 2018. Patient has hx of drug use (Xanax, cocaine, MJ, opiates, hx of inpatient rehab). Patient is BIB father.  Patient is now hospitalized with a primary problem of worsening depression, agitation, aggression, and emotional dysregulation    12/7 Update  Patient more anxious and pressured today   Tolerated CASTANEDA of Aristada and asking for increase in antianxiety meds and will increase Gabapentin   Very discharge focused
As above.  Patient cannot return home to father because of his concerns about her being home alone during the day.  Exploring in person PHP options to keep her out of the house during the day.  
As above.  Patient status quo, with improved symptoms on Aristada and lithium but overall still oddly related.  Working on dispo plan to in person PHP, family meeting planned tomorrow.  
26 y/o single woman.  Patient has PPHx of historical diagnoses of Borderline Personality Disorder, schizoaffective disorder, r/o MDD, Bipolar II Disorder, IED. Patient has 2 prior admissions, last hospitalized at [facility] 2016, and [place] 2018. Patient has hx of drug use (Xanax, cocaine, MJ, opiates, hx of inpatient rehab). Patient is BIB father.  Patient is now hospitalized with a primary problem of worsening depression, agitation, aggression, and emotional dysregulation    12/5 Update  Patient states that she doesn't understand why she is in the hospital  Though she agrees to CASTANEDA of Aristada does not understand why she needs it  Remains minimally cooperative and ends interview abruptly
As above.  Patient has persistent social deficits and resistance to talking about treatment.  However, she has been in consistently good behavioral control.  No evidence of SI or HI.  No AH or paranoia.  She will be discharged and will follow up tomorrow in [facility].  
28 y/o single woman.  Patient has PPHx of historical diagnoses of Borderline Personality Disorder, schizoaffective disorder, r/o MDD, Bipolar II Disorder, IED. Patient has 2 prior admissions, last hospitalized at [facility] 2016, and [place] 2018. Patient has hx of drug use (Xanax, cocaine, MJ, opiates, hx of inpatient rehab). Patient is BIB father.  Patient is now hospitalized with a primary problem of worsening depression, agitation, aggression, and emotional dysregulation    12/6 Update  Patient states that she doesn't understand why she is in the hospital and why she needs to wait for PHP  Has agreed to CASTANEDA of Aristada and tolerated it well  Very discharge focused
Tolerating med regimen well.  More pleasant.  Had successful family meeting today.  Will be discharged to [facility] this week. 
As above.  Paranoid and guarded.  No side effects or signs of toxicity on Li with level today 0.7.  Complaints of nasal/sinus congestion but no other Covid sx.
As above.  Improving on current regimen.  Still guarded and somatically preoccupied, poorly related.  Dispo to PHP. 
Updated legals to [facility].  Patient is improving on current regimen.  Remains somewhat oddly related and has some paranoia about food.  Continue current regimen and will plan dispo to PHP.

## 2022-12-14 NOTE — BH INPATIENT PSYCHIATRY PROGRESS NOTE - NSDCCRITERIA_PSY_ALL_CORE
Symptom stabilization  CGI<=2  CASTANEDA

## 2022-12-14 NOTE — BH INPATIENT PSYCHIATRY PROGRESS NOTE - NSTXMEDICDATETRGT_PSY_ALL_CORE
19-Nov-2022
19-Nov-2022
14-Dec-2022
06-Dec-2022
19-Nov-2022
19-Nov-2022
14-Dec-2022
19-Nov-2022
14-Dec-2022
19-Nov-2022
06-Dec-2022
06-Dec-2022
19-Nov-2022
06-Dec-2022
14-Dec-2022
19-Nov-2022
06-Dec-2022
19-Nov-2022

## 2022-12-14 NOTE — BH INPATIENT PSYCHIATRY PROGRESS NOTE - NSTXMEDICDATENEW_PSY_ALL_CORE
23-Nov-2022

## 2022-12-14 NOTE — BH INPATIENT PSYCHIATRY PROGRESS NOTE - NSTXMEDICDATEEST_PSY_ALL_CORE
07-Dec-2022
12-Nov-2022
07-Dec-2022
07-Dec-2022
12-Nov-2022
07-Dec-2022
07-Dec-2022
12-Nov-2022
12-Nov-2022
07-Dec-2022
12-Nov-2022

## 2022-12-14 NOTE — BH INPATIENT PSYCHIATRY PROGRESS NOTE - NSTXDISORGDATEEST_PSY_ALL_CORE
07-Dec-2022
07-Dec-2022
12-Nov-2022
12-Nov-2022
07-Dec-2022
12-Nov-2022
07-Dec-2022
07-Dec-2022
12-Nov-2022
07-Dec-2022
12-Nov-2022
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
## 2022-12-14 NOTE — BH INPATIENT PSYCHIATRY PROGRESS NOTE - NSTXNEGATGOAL_PSY_ALL_CORE
Will be able to attend group sessions to promote skill development, despite negative self-talk
Will be able to identify and utilize affirmations to create positive self-talk
Will be able to attend group sessions to promote skill development, despite negative self-talk
Will be able to demonstrate understanding of self-talk and its relationship to self-image and communication through discussion with staff once a day
Will be able to attend group sessions to promote skill development, despite negative self-talk
Will be able to demonstrate understanding of self-talk and its relationship to self-image and communication through discussion with staff once a day
Will be able to attend group sessions to promote skill development, despite negative self-talk
Will be able to identify and utilize affirmations to create positive self-talk
Will be able to attend group sessions to promote skill development, despite negative self-talk
Will be able to attend group sessions to promote skill development, despite negative self-talk
Will be able to identify and utilize affirmations to create positive self-talk
Will be able to demonstrate understanding of self-talk and its relationship to self-image and communication through discussion with staff once a day
Will be able to identify and utilize affirmations to create positive self-talk
Will be able to demonstrate understanding of self-talk and its relationship to self-image and communication through discussion with staff once a day
Will be able to demonstrate understanding of self-talk and its relationship to self-image and communication through discussion with staff once a day

## 2022-12-14 NOTE — BH INPATIENT PSYCHIATRY PROGRESS NOTE - NSTXMEDICGOAL_PSY_ALL_CORE
Take all medications as prescribed
Be able to describe the benefit of medication/treatment
Take all medications as prescribed
Be able to describe the benefit of medication/treatment
Take all medications as prescribed
Take all medications as prescribed
Be able to describe the benefit of medication/treatment
Take all medications as prescribed
Be able to describe the benefit of medication/treatment
Be able to describe the benefit of medication/treatment
Take all medications as prescribed
Take all medications as prescribed

## 2022-12-14 NOTE — BH INPATIENT PSYCHIATRY PROGRESS NOTE - NSTXDISORGINTERMD_PSY_ALL_CORE
Psychopharm with goal of mood stabilization+CASTANEDA and supportive therapy with appropriate aftercare/possible AOPD CASTANEDA clinic referral
Psychopharm with goal of mood stabilization+CASTANEDA and supportive therapy with appropriate aftercare/possible AOPD CASTAENDA clinic referral
Psychopharm with goal of mood stabilization+CASTANEDA and supportive therapy with appropriate aftercare/possible AOPD CASTANEDA clinic referral

## 2022-12-14 NOTE — BH INPATIENT PSYCHIATRY PROGRESS NOTE - NSBHATTESTBILLINGAW_PSY_A_CORE
84426-Xyjlryzobq Inpatient care - moderate complexity - 25 minutes
50534-Bmnbgkjydq Inpatient care - moderate complexity - 25 minutes
09888-Iaxabbhdrl Inpatient care - high complexity - 35 minutes
62066-Mumndadjyi Inpatient care - moderate complexity - 25 minutes
32477-Qyueznjrkh Inpatient care - moderate complexity - 25 minutes
38232-Ltudqadbbo Inpatient care - moderate complexity - 25 minutes
05024-Xhlqaicang Inpatient care - low complexity - 15 minutes
34909-Czfolunegu Inpatient care - low complexity - 15 minutes
37846-Ghjzzoimoq Inpatient care - low complexity - 15 minutes
Non-billable
19003-Ddskhlrkje Inpatient care - low complexity - 15 minutes
04454-Qjfztefgym Inpatient care - moderate complexity - 25 minutes
38675-Qgujhuoyuc Inpatient care - low complexity - 15 minutes
84555-Vvwmiehehv Inpatient care - low complexity - 15 minutes
25260-Xnabsxoylr Inpatient care - low complexity - 15 minutes
24595-Gtoielkdxc Inpatient care - low complexity - 15 minutes
81466-Icdktjppzl Inpatient care - moderate complexity - 25 minutes
86081-Morxkryrxx Inpatient care - moderate complexity - 25 minutes
39171-Riqoitjrra Inpatient care - high complexity - 35 minutes
93011-Yeftnhrvfa Inpatient care - moderate complexity - 25 minutes
37007-Qnlebygmia Inpatient care - low complexity - 15 minutes
66624-Bqvspwbqxb Inpatient care - low complexity - 15 minutes

## 2022-12-14 NOTE — BH INPATIENT PSYCHIATRY PROGRESS NOTE - NSBHCONTPROVIDER_PSY_ALL_CORE
No, attempted...

## 2022-12-14 NOTE — BH INPATIENT PSYCHIATRY PROGRESS NOTE - NSTXDISORGDATETRGT_PSY_ALL_CORE
19-Nov-2022
06-Dec-2022
14-Dec-2022
14-Dec-2022
19-Nov-2022
06-Dec-2022
19-Nov-2022
19-Nov-2022
14-Dec-2022
19-Nov-2022
14-Dec-2022
19-Nov-2022
19-Nov-2022
14-Dec-2022
19-Nov-2022
14-Dec-2022
19-Nov-2022
06-Dec-2022
06-Dec-2022
19-Nov-2022
06-Dec-2022
19-Nov-2022

## 2022-12-14 NOTE — BH INPATIENT PSYCHIATRY PROGRESS NOTE - NSICDXBHPRIMARYDX_PSY_ALL_CORE
Schizoaffective disorder   F25.9  

## 2022-12-14 NOTE — BH INPATIENT PSYCHIATRY PROGRESS NOTE - NSTXVIOLNTPROGRES_PSY_ALL_CORE
No Change
Met - goal discontinued
Met - goal discontinued
No Change
No Change
Improving
Met - goal discontinued
No Change
Improving
Improving
Met - goal discontinued
No Change
Met - goal discontinued
Improving
Met - goal discontinued
No Change
Improving
No Change

## 2022-12-14 NOTE — BH INPATIENT PSYCHIATRY PROGRESS NOTE - NSTXPROBCONDUC_PSY_ALL_CORE
CONDUCT PROBLEM

## 2022-12-14 NOTE — BH INPATIENT PSYCHIATRY PROGRESS NOTE - NSTXPROBVIOLNT_PSY_ALL_CORE
VIOLENT/AGGRESSIVE BEHAVIOR

## 2022-12-14 NOTE — BH INPATIENT PSYCHIATRY PROGRESS NOTE - NS ED BHA REVIEW OF ED CHART AVAILABLE INVESTIGATIONS REVIEWED
ADMISSION H&P:       Trinidad Estrada is a 56 y.o. -American Female who presents today for her routine annual GYN exam. The patient has no Gynecology complaints today. Spotting last month, counseled. Ultrasound reviewed, possible polyp- small fibroid. 6mm endometrial thickness.   Counseled on Risks, Benefits and Alternatives to hysterescopy- operative / myosure, discused with patient in detail, all questions answered and patient agreed to proceed.          Allergies:        Review of patient's allergies indicates:   Allergen Reactions    Codeine Itching              Past Medical History:   Diagnosis Date    Diverticulitis                 Past Surgical History:   Procedure Laterality Date    LEG SURGERY             MEDS:          Current Outpatient Medications on File Prior to Visit   Medication Sig Dispense Refill    azelastine (ASTELIN) 137 mcg (0.1 %) nasal spray 1 spray (137 mcg total) by Nasal route 2 (two) times daily. 30 mL 1    meclizine (ANTIVERT) 25 mg tablet Take 1 tablet (25 mg total) by mouth 3 (three) times daily as needed for Dizziness. 30 tablet 1      No current facility-administered medications on file prior to visit.                   OB History         2    Para   2    Term   2            AB        Living            SAB        TAB        Ectopic        Multiple        Live Births                         Social History            Socioeconomic History    Marital status:        Spouse name: Not on file    Number of children: Not on file    Years of education: Not on file    Highest education level: Not on file   Occupational History    Not on file   Social Needs    Financial resource strain: Not on file    Food insecurity:       Worry: Not on file       Inability: Not on file    Transportation needs:       Medical: Not on file       Non-medical: Not on file   Tobacco Use    Smoking status: Never Smoker   Substance and Sexual Activity    Alcohol use: 
No    Drug use: Not on file    Sexual activity: Not on file   Lifestyle    Physical activity:       Days per week: Not on file       Minutes per session: Not on file    Stress: Not on file   Relationships    Social connections:       Talks on phone: Not on file       Gets together: Not on file       Attends Christianity service: Not on file       Active member of club or organization: Not on file       Attends meetings of clubs or organizations: Not on file       Relationship status: Not on file   Other Topics Concern    Not on file   Social History Narrative    Not on file               Family History   Problem Relation Age of Onset    Cataracts Mother              Past medical and surgical history reviewed.   I have reviewed the patient's medical history in detail and updated the computerized patient record.           Review of System:   General: no chills, fever, night sweats, weight gain or weight loss  Psychological: no depression or suicidal ideation  Breasts: no new or changing breast lumps, nipple discharge or masses.  Respiratory: no cough, shortness of breath, or wheezing  Cardiovascular: no chest pain or dyspnea on exertion  Gastrointestinal: no abdominal pain, change in bowel habits, or black or bloody stools  Genito-Urinary: no incontinence, urinary frequency/urgency or vulvar/vaginal symptoms, pelvic pain or abnormal vaginal bleeding.  Musculoskeletal: no gait disturbance or muscular weakness        Physical Exam:   /80   Wt 100.9 kg (222 lb 7.1 oz)   LMP 12/16/2018 Comment: Pt states that it has been 2 years  BMI 39.40 kg/m²   Constitutional: She appears alert and responsive. She appears well-developed, well-groomed, and well-nourished. No distress. OverWeight   HENT:   Head: Normocephalic and atraumatic.   Eyes: Conjunctivae and EOM are normal. No scleral icterus.   Neck: Symmetrical. Normal range of motion. Neck supple. No tracheal deviation present. THYROID: without masses or 
Yes
Yes
tenderness.  Cardiovascular: Normal rate, no rhythm abnormality noted. Extremities without swelling or edema, warm.    Pulmonary/Chest: Normal respiratory Effort. No distress or retractions. She exhibits no tenderness.  Breasts: are symmetrical.   Right breast exhibits no inverted nipple, no mass, no nipple discharge, no skin change and no tenderness.   Left breast exhibits no inverted nipple, no mass, no nipple discharge, no skin change and no tenderness.  Abdominal: Soft. She exhibits no distension, hernias or masses. There is no tenderness. No enlargement of liver edge or spleen.  There is no rebound and no guarding.   Genitourinary:    External rectal exam shows no thrombosed external hemorrhoids, no lesions.     Pelvic exam was performed with patient supine.   No labial fusion, and symmetrical.    There is no rash, lesion or injury on the right labia.   There is no rash, lesion or injury on the left labia.   No bleeding and no signs of injury around the vaginal introitus, urethral meatus is normal size and without prolapse or lesions, urethra well supported. The cervix is visualized with no discharge, lesions or friability.   No vaginal discharge found.   No significant Cystocele, Enterocele or rectocele, and cervix and uterus well supported.   Bimanual exam:   The urethra is normal to palpation and there are no palpable vaginal wall masses.   Uterus is not deviated, not enlarged, not fixed, normal shape and not tender.   Cervix exhibits no motion tenderness.    Right adnexum displays no mass or nodularity and no tenderness.   Left adnexum displays no mass or nodularity and no tenderness.  Musculoskeletal: Normal range of motion.   Lymphadenopathy: No inguinal adenopathy present.   Neurological: She is alert and oriented to person, place, and time. Coordination normal.   Skin: Skin is warm and dry. She is not diaphoretic. No rashes, lesions or ulcers.   Psychiatric: She has a normal mood and affect, oriented to 
person, place, and time.     Follow up u/s(12/2019):  FINDINGS:  Uterus:  Size: 10 x 4 x 5 cm  Masses: There is a 1.4 cm anechoic lesion at the posterior fundus, intramural location.  Endometrium: Normal in this post menopausal patient, measuring 6 mm.  Note is made of a 7 mm anechoic lesion within the endometrial cavity near the fundus.  Right ovary: Size: 2.6 x 1.8 x 2 cm    Left ovary: Size: 2.9 x 1.5 x 1.8 cm     Assessment: postMenopausal Bleeding   ? endometiral polyp- 6mm endometrial thickness.      Plan:   Normal PAP and mammogram  Planned Myosure excision polyp / endometrial sampling.      
Yes

## 2022-12-14 NOTE — BH INPATIENT PSYCHIATRY PROGRESS NOTE - NSTXPROBDCOTHR_PSY_ALL_CORE
DISCHARGE ISSUE - OTHER

## 2022-12-14 NOTE — BH INPATIENT PSYCHIATRY PROGRESS NOTE - NSBHASSESSSUMMFT_PSY_ALL_CORE
26 y/o single woman.  Patient has PPHx of historical diagnoses of Borderline Personality Disorder, schizoaffective disorder, r/o MDD, Bipolar II Disorder, IED. Patient has 2 prior admissions, last hospitalized at [facility] 2016, and [place] 2018. Patient has hx of drug use (Xanax, cocaine, MJ, opiates, hx of inpatient rehab). Patient is BIB father.  Patient is now hospitalized with a primary problem of worsening depression, agitation, aggression, and emotional dysregulation. Patient admitted to [facility] on a 9.39 legal status, now transferred to  due to covid positive status.     Amenable to continuing current medication regimen. CASTANEDA Aristada initially on 12/2/22, then received Aristada long acting injectable 1064mg on 12/5/22, well tolerated. Plan for another long acting injectable dose of Aristada in 4-6 weeks. No reported AH/VH or SI/HI.    Remains with significant anxiety though continues to display good behavioral control and is adherent with treatment recommendations. Would benefit from partial hospitalization due to continued potential risk of harm to self. After group meeting with father yesterday, he agreed to take her home. Discharge pending spot in partial hospitalization.     Plan:  >Legal: 9.39  >Obs: Routine. no need for CO, patient not expected to pose risk to self or others in controlled inpatient setting  >Continue current regimen of psychiatric medications     Medications (standing):  gabapentin 600 milliGRAM(s) Oral QID standuig  lithium CR (ESKALITH-CR) 900 milliGRAM(s) Oral at bedtime  nicotine - 21 mG/24Hr(s) Patch 1 Patch Transdermal daily  Received Aristada initio followed by Aristada 1064mg on 12/5    MEDICATIONS  (PRN):  haloperidol     Tablet 5 milliGRAM(s) Oral every 6 hours PRN agitation  haloperidol    Injectable 5 milliGRAM(s) IntraMuscular Once PRN agitation  nicotine  Polacrilex Gum 4 milliGRAM(s) Oral every 3 hours PRN nicotine cravings  QUEtiapine 50 milliGRAM(s) Oral at bedtime PRN mood  sodium chloride 0.65% Nasal 1 Spray(s) Both Nostrils three times a day PRN Nasal Congestion    >Labs: L. level 0.8 on 12/6. Admission labs reviewed, no acute findings. utox +methadone. A1C and lipid labs grossly normal, QTc <500  >Medical:   No acute concerns. No consultations needed at this time. No indication for CIWA. Patient with consistently stable VS, no visible physical symptoms of withdrawal.   During the course of treatment, will collaborate with medical team to manage medical issues.  >Diet: Regular  >Social: milieu/structured therapy  >Treatment Interventions: Groups and Individual Therapy/CBT, Motivational counseling for substance abuse related issues.   >Dispo: Collateral and dispo planning pending further symptom and medication optimization. Awaiting response from [facility]. 26 y/o single woman.  Patient has PPHx of historical diagnoses of Borderline Personality Disorder, schizoaffective disorder, r/o MDD, Bipolar II Disorder, IED. Patient has 2 prior admissions, last hospitalized at [facility] 2016, and [place] 2018. Patient has hx of drug use (Xanax, cocaine, MJ, opiates, hx of inpatient rehab). Patient is BIB father.  Patient is now hospitalized with a primary problem of worsening depression, agitation, aggression, and emotional dysregulation. Patient admitted to [facility] on a 9.39 legal status, now transferred to  due to covid positive status.     Amenable to continuing current medication regimen. CASTANEDA Aristada initially on 12/2/22, then received Aristada long acting injectable 1064mg on 12/5/22, well tolerated. Plan for another long acting injectable dose of Aristada in 4-6 weeks. No reported AH/VH or SI/HI.    Remains with significant anxiety though continues to display good behavioral control and is adherent with treatment recommendations. Would benefit from partial hospitalization due to continued potential risk of harm to self. After group meeting with father yesterday, he agreed to take her home. Will discharge today.     Plan:  >Legal: 9.39  >Obs: Routine. no need for CO, patient not expected to pose risk to self or others in controlled inpatient setting  >Continue current regimen of psychiatric medications     Medications (standing):  gabapentin 600 milliGRAM(s) Oral QID standuig  lithium CR (ESKALITH-CR) 900 milliGRAM(s) Oral at bedtime  nicotine - 21 mG/24Hr(s) Patch 1 Patch Transdermal daily  Received Aristada initio followed by Aristada 1064mg on 12/5      >Labs: L. level 0.8 on 12/6. Admission labs reviewed, no acute findings. utox +methadone. A1C and lipid labs grossly normal, QTc <500  >Medical:   No acute concerns. No consultations needed at this time. No indication for CIWA. Patient with consistently stable VS, no visible physical symptoms of withdrawal.   During the course of treatment, will collaborate with medical team to manage medical issues.  >Diet: Regular  >Social: milieu/structured therapy  >Treatment Interventions: Groups and Individual Therapy/CBT, Motivational counseling for substance abuse related issues.   >Dispo: Collateral and dispo planning pending further symptom and medication optimization. Awaiting response from [facility].

## 2022-12-14 NOTE — BH INPATIENT PSYCHIATRY PROGRESS NOTE - NSTXDCOTHRDATEEST_PSY_ALL_CORE
22-Nov-2022
12-Nov-2022
14-Nov-2022
14-Nov-2022
09-Nov-2022
14-Nov-2022
14-Nov-2022
09-Nov-2022
22-Nov-2022
14-Nov-2022
09-Nov-2022
09-Nov-2022
14-Nov-2022
09-Nov-2022
14-Nov-2022
22-Nov-2022
14-Nov-2022
22-Nov-2022

## 2022-12-14 NOTE — BH INPATIENT PSYCHIATRY DISCHARGE NOTE - OTHER PAST PSYCHIATRIC HISTORY (INCLUDE DETAILS REGARDING ONSET, COURSE OF ILLNESS, INPATIENT/OUTPATIENT TREATMENT)
28 y/o single woman.  Patient has PPHx of historical diagnoses of Borderline Personality Disorder, schizoaffective disorder, r/o MDD, Bipolar II Disorder, IED. Patient has 2 prior admissions, last hospitalized at [facility] 2016, and [place] 2018. Patient has hx of drug use (Xanax, cocaine, MJ, opiates, hx of inpatient rehab). Patient is BIB father.  Patient is now hospitalized with a primary problem of worsening depression, agitation, aggression, and emotional dysregulation. Patient admitted to [facility] on a 9.39legal status.

## 2022-12-14 NOTE — BH INPATIENT PSYCHIATRY PROGRESS NOTE - NSTXCONDUCDATETRGT_PSY_ALL_CORE
06-Dec-2022
14-Dec-2022
06-Dec-2022
14-Dec-2022
14-Dec-2022
06-Dec-2022
06-Dec-2022
14-Dec-2022
06-Dec-2022

## 2022-12-14 NOTE — BH INPATIENT PSYCHIATRY PROGRESS NOTE - NSBHADMITIPREASONDETAILS_PSY_A_CORE FT
with impaired judgment

## 2022-12-14 NOTE — BH INPATIENT PSYCHIATRY PROGRESS NOTE - NSTXNEURODATEEST_PSY_ALL_CORE
12-Nov-2022

## 2022-12-14 NOTE — BH INPATIENT PSYCHIATRY PROGRESS NOTE - NSTXNEURODATETRGT_PSY_ALL_CORE
07-Dec-2022

## 2022-12-14 NOTE — BH INPATIENT PSYCHIATRY PROGRESS NOTE - NSBHCONSBHPROVDETAILS_PSY_A_CORE  FT
defer to primary team

## 2022-12-14 NOTE — BH INPATIENT PSYCHIATRY PROGRESS NOTE - NSTXDEPRESDATEEST_PSY_ALL_CORE
07-Dec-2022
07-Dec-2022
12-Nov-2022
23-Nov-2022
12-Nov-2022
23-Nov-2022
23-Nov-2022
07-Dec-2022
23-Nov-2022
07-Dec-2022
12-Nov-2022
07-Dec-2022
12-Nov-2022
23-Nov-2022
07-Dec-2022
12-Nov-2022

## 2022-12-14 NOTE — BH INPATIENT PSYCHIATRY PROGRESS NOTE - CURRENT MEDICATION
MEDICATIONS  (STANDING):  gabapentin 600 milliGRAM(s) Oral <User Schedule>  lithium CR (ESKALITH-CR) 900 milliGRAM(s) Oral at bedtime  nicotine - 21 mG/24Hr(s) Patch 1 Patch Transdermal daily    MEDICATIONS  (PRN):  haloperidol     Tablet 5 milliGRAM(s) Oral every 6 hours PRN agitation  haloperidol    Injectable 5 milliGRAM(s) IntraMuscular Once PRN agitation  LORazepam   Injectable 2 milliGRAM(s) IntraMuscular Once PRN Agitation  nicotine  Polacrilex Gum 4 milliGRAM(s) Oral every 3 hours PRN nicotine cravings  QUEtiapine 50 milliGRAM(s) Oral at bedtime PRN mood  sodium chloride 0.65% Nasal 1 Spray(s) Both Nostrils three times a day PRN Nasal Congestion

## 2022-12-14 NOTE — BH INPATIENT PSYCHIATRY PROGRESS NOTE - NSTXVIOLNTDATETRGT_PSY_ALL_CORE
06-Dec-2022
19-Nov-2022
19-Nov-2022
07-Dec-2022
19-Nov-2022
07-Dec-2022
07-Dec-2022
06-Dec-2022
19-Nov-2022
06-Dec-2022
07-Dec-2022
19-Nov-2022
19-Nov-2022

## 2022-12-14 NOTE — BH INPATIENT PSYCHIATRY PROGRESS NOTE - NSTXPSYCHOGOAL_PSY_ALL_CORE
Will identify 2 coping skills that assist with focus on reality
Make at least 5 reality based statements/requests to staff and/or peers
Will verbalize 1 strategy to successfully cope with psychotic symptoms
Make at least 5 reality based statements/requests to staff and/or peers
Will verbalize 1 strategy to successfully cope with psychotic symptoms
Will verbalize 1 strategy to successfully cope with psychotic symptoms
Will identify 2 coping skills that assist with focus on reality
Will identify 2 coping skills that assist with focus on reality
Make at least 5 reality based statements/requests to staff and/or peers
Will identify 2 coping skills that assist with focus on reality
Make at least 5 reality based statements/requests to staff and/or peers
Will identify 2 coping skills that assist with focus on reality

## 2022-12-14 NOTE — BH INPATIENT PSYCHIATRY PROGRESS NOTE - NSTXDEPRESGOAL_PSY_ALL_CORE
Will identify 2 coping skills that assist in improving mood
Will identify 2 coping skills that assist in improving mood
Exhibit improvements in self-grooming, hygiene, sleep and appetite
Attend and participate in at least 2 groups daily despite low mood/energy
Exhibit improvements in self-grooming, hygiene, sleep and appetite
Exhibit improvements in self-grooming, hygiene, sleep and appetite
Will identify 2 coping skills that assist in improving mood
Attend and participate in at least 2 groups daily despite low mood/energy
Attend and participate in at least 2 groups daily despite low mood/energy
Exhibit improvements in self-grooming, hygiene, sleep and appetite
Attend and participate in at least 2 groups daily despite low mood/energy
Attend and participate in at least 2 groups daily despite low mood/energy
Will identify 2 coping skills that assist in improving mood
Attend and participate in at least 2 groups daily despite low mood/energy
Will identify 2 coping skills that assist in improving mood

## 2022-12-14 NOTE — BH INPATIENT PSYCHIATRY PROGRESS NOTE - NSTREATMENTCERTY_PSY_ALL_CORE

## 2022-12-14 NOTE — BH INPATIENT PSYCHIATRY PROGRESS NOTE - NSBHCONSULTIPREASON_PSY_A_CORE
danger to others; mental illness expected to respond to inpatient care

## 2022-12-14 NOTE — BH INPATIENT PSYCHIATRY PROGRESS NOTE - NSTXPROBNEGAT_PSY_ALL_CORE
NEGATIVE SELF-TALK

## 2022-12-14 NOTE — BH INPATIENT PSYCHIATRY PROGRESS NOTE - NSBHLEGALSTATUSDATE_PSY_ALL_CORE
01-Dec-2022 00:00
01-Dec-2022
01-Dec-2022 00:00
01-Dec-2022 00:00
01-Dec-2022

## 2022-12-14 NOTE — BH INPATIENT PSYCHIATRY PROGRESS NOTE - MSE UNSTRUCTURED FT
General: anxious  Appearance: well groomed, well dressed, appears stated age, poor eye contact  Speech: normal rate, normal rhythm, normal volume  Motor: normal gait, no tics or other abnormal movements noted  Mood: "fine"  Affect:  anxious, distressed, incongruent, constricted  Thought process: concrete  Thought content: no delusions, no reported suicidal ideation or homicidal ideation  Perception: no hallucinations  Insight: poor  Judgment: fair  Impulse control: fair on this assessment  Cognition: grossly intact General: anxious  Appearance: well groomed, well dressed, appears stated age, poor eye contact  Speech: normal rate, normal rhythm, normal volume  Motor: normal gait, no tics or other abnormal movements noted  Mood: "fine"  Affect:  anxious, incongruent, constricted  Thought process: concrete  Thought content: no delusions, no reported suicidal ideation or homicidal ideation  Perception: no hallucinations  Insight: poor  Judgment: fair  Impulse control: fair on this assessment  Cognition: grossly intact

## 2022-12-14 NOTE — BH INPATIENT PSYCHIATRY PROGRESS NOTE - NSTXDEPRESPROGRES_PSY_ALL_CORE
Improving
n/a
Improving

## 2022-12-14 NOTE — BH INPATIENT PSYCHIATRY PROGRESS NOTE - NSTXCOPEGOAL_PSY_ALL_CORE
Identify and utilize 2 coping skills that meet their needs

## 2022-12-14 NOTE — BH INPATIENT PSYCHIATRY PROGRESS NOTE - NSTXVIOLNTDATEEST_PSY_ALL_CORE
12-Nov-2022

## 2022-12-14 NOTE — BH INPATIENT PSYCHIATRY PROGRESS NOTE - NSTXCOPEPROGRES_PSY_ALL_CORE
Improving
Improving
No Change
Improving
No Change
Improving
No Change
No Change
Improving
Improving
No Change
No Change
Improving
No Change
No Change

## 2022-12-14 NOTE — BH INPATIENT PSYCHIATRY DISCHARGE NOTE - NSDCCPCAREPLAN_GEN_ALL_CORE_FT
PRINCIPAL DISCHARGE DIAGNOSIS  Diagnosis: Schizoaffective disorder  Assessment and Plan of Treatment:       SECONDARY DISCHARGE DIAGNOSES  Diagnosis: Noncompliance with medications  Assessment and Plan of Treatment:

## 2022-12-14 NOTE — BH INPATIENT PSYCHIATRY PROGRESS NOTE - NSTXNEGATDATEEST_PSY_ALL_CORE
12-Nov-2022
07-Dec-2022
12-Nov-2022
07-Dec-2022
12-Nov-2022
07-Dec-2022
07-Dec-2022
12-Nov-2022
12-Nov-2022

## 2022-12-14 NOTE — BH INPATIENT PSYCHIATRY PROGRESS NOTE - NSTXCOPEDATETRGT_PSY_ALL_CORE
20-Nov-2022
14-Dec-2022
10-Dec-2022
20-Nov-2022
06-Dec-2022
14-Dec-2022
20-Nov-2022
10-Dec-2022
20-Nov-2022
14-Dec-2022
20-Nov-2022
20-Nov-2022
06-Dec-2022
06-Dec-2022

## 2022-12-14 NOTE — BH INPATIENT PSYCHIATRY DISCHARGE NOTE - POSTFACE STATEMENT FOR MINUTES SPENT
minutes on the discharge service.
History of laparoscopic cholecystectomy    History of left inguinal hernia

## 2022-12-14 NOTE — BH INPATIENT PSYCHIATRY DISCHARGE NOTE - HOSPITAL COURSE
The patient was started on PO Abilify titrated to 30mg with good effect and tolerability.  Lithium was also started and titrated to 900mg HS with a steady state level of 0.8.  Gradually, paranoid thoughts receded and patient's behavior became increasingly more appropriate and her demeanor calm.  She was found to be Covid+ on the original unit where she was treated and was transferred to the Covid+ psych unit for ongoing care.  There she received Aristada initio followed by Aristada 1064mg on 12/2 and 12/5 respectively.  PO Abilify was discontinued and patient continued to do well.  She remained very anxious with interviews with significant resistance to discussing difficult topics.  Discharge was delayed by disposition issues: father was reticent about allowing the patient to return home given history of noncompliance, substance use, at times risky sexual behavior.  Family meeting was held to discuss these issues and patient contracted to follow her father's rules for the home which were reasonable expectations for keeping her safe such as no substance use in the home, not having men over the house while he is not home, etc.  Patient is motivated to continue treatment in [facility] and will be discharged today.      Suicide and risk assessment performed prior to discharge. The patient has a low acute risk and low chronic risk of self-harm and aggression towards others. Protective factors include denying SI, no SIB, denying HI, good social supports in their family, no current mood symptoms, no hopelessness, future-oriented in returning to home, no access to firearms.  Risk factors include presenting illness, history of noncompliance with treatment, history of substance use. Immediate risk was minimized by inpatient admission to a safe environment with appropriate supervision and limited access to lethal means. Future risk was minimized before discharge by treatment of acute episode, maximizing outpatient support, providing relevant patient education, discussing emergency procedures, and ensuring close follow-up. The patient remains at a low risk of self-harm, and such risk cannot be further ameliorated by continued inpatient treatment and the patient is therefore appropriate for discharge.       There were no behavioral problems on the unit.  Patient did not become agitated and did not require emergent intramuscular medications or seclusion / restraints.  Patient did not self-harm on the unit.  Patient remained actively engaged in treatment.  Patient participated in individual, group, and milieu therapy.  Patient got along appropriately with staff and peers.   Patient did not have any medical problems during this hospitalization.  There were no medical consultations.    A full discussion of the factors that predict treatment success and relapse was held including safety planning.  A discussion of the risks and benefits of patient’s medication was held including a discussion of the metabolic risks and risk of EPS and TD was done.    The patient has improved significantly and no longer requires inpatient treatment and care. Patient denies all suicidal and aggressive ideation, intent and plan. Patient denies anxiety symptoms and panic attacks. Patient is not judged to be an acute danger to self or others at this time. Patient will be discharged today to home and outpatient follow up.

## 2022-12-14 NOTE — BH INPATIENT PSYCHIATRY PROGRESS NOTE - NSTXCONDUCDATEEST_PSY_ALL_CORE
07-Dec-2022
12-Nov-2022
07-Dec-2022
12-Nov-2022
07-Dec-2022
07-Dec-2022
12-Nov-2022

## 2022-12-14 NOTE — BH INPATIENT PSYCHIATRY PROGRESS NOTE - NSTXDCOTHRGOAL_PSY_ALL_CORE
Pt will adhere to medication regimen and display behavioral control x7 days.
Pt will adhere to medication regimen and display behavioral control x7 days.
Pt will not exhibit agitation or aggression x 7 days and will utilize adaptive coping skills to manage her emotions.
Pt will adhere to medication regimen and display behavioral control x7 days.
Pt will not exhibit agitation or aggression x 7 days and will utilize adaptive coping skills to manage her emotions.
Pt will adhere to medication regimen and display behavioral control x7 days.
Pt will not exhibit agitation or aggression x 7 days and will utilize adaptive coping skills to manage her emotions.
Pt will adhere to medication regimen and display behavioral control x7 days.
Pt will not exhibit agitation or aggression x 7 days and will utilize adaptive coping skills to manage her emotions.
Pt will not exhibit agitation or aggression x 7 days and will utilize adaptive coping skills to manage her emotions.
Pt will adhere to medication regimen and display behavioral control x7 days.
Pt will adhere to medication regimen and display behavioral control x 7 days
Pt will adhere to medication regimen and display behavioral control x7 days.
Pt will adhere to medication regimen and display behavioral control x7 days.
Pt will adhere to medication regimen and display behavioral control x 7 days
Pt will adhere to medication regimen and display behavioral control x 7 days
Pt will adhere to medication regimen and display behavioral control x7 days.
Pt will adhere to medication regimen and display behavioral control x 7 days
Pt will adhere to medication regimen and display behavioral control x7 days.
Pt will adhere to medication regimen and display behavioral control x7 days.

## 2022-12-14 NOTE — BH INPATIENT PSYCHIATRY PROGRESS NOTE - NSTXANXGOAL_PSY_ALL_CORE
Admitted:     02/16/2019      PRIMARY CARE PHYSICIAN:  Dr. Ana Pratt.      ASSESSMENT AND PLAN:  This is a 68-year-old gentleman with a history of advanced COPD with multiple hospitalizations for that recently.  He was just discharged from the hospital yesterday after being admitted for COPD exacerbation.  He comes back today.  His past medical history is most notable for 4 admissions in the last 6 months.  He is familiar to me from hospitalization in December where he was seen for MSSA bacteremia thought to be due to pneumonia.  He also has a past medical history of atrial fibrillation with previous ablation, tricuspid valve replacement, hypertension, nonsustained VT.  The patient states that he was feeling well when he left the hospital yesterday, was doing okay, but had again worsening cough and wheezing after going home and could not breathe and came back to the ER today.      1.  Acute hypoxic respiratory failure due to chronic obstructive pulmonary disease exacerbation.  At this time we will admit the patient to the hospital.  He is still quite tight and wheezy on exam.  I will continue him on IV steroids, scheduled nebulizers and other treatment for his COPD.  No clear-cut exacerbation trigger for it at this time.  He does not recall any sick contacts.   2.  Recent diagnosis of PE.  This was just diagnosed when he was hospitalized a few days ago over here.  He had a small PE on the CT chest.  I think that is unlikely to be causing his symptoms given the remarkable wheezing on exam.  He received about 4 days of heparin and Lovenox in the hospital and was sent home on Eliquis.  I will increase the dose to 10 mg b.i.d. given the pulmonary embolism.  Decrease to 5 mg b.i.d. After 7 days   3.  Previous history of atrial fibrillation/flutter, status post previous ablation procedure.  He did have issues with atrial fibrillation with RVR during previous hospitalizations.  At this time we will continue him on 
Be able to participate in activities despite lingering anxiety/panic
Be able to participate in activities despite lingering anxiety/panic
his baseline home medications.  He did follow with [facility] as an outpatient.  The thought was to avoid a repeat ablation procedure at this time given his recent infectious issues which would decrease his chance of success.  He did have his propafenone dose increased.  We will continue that and watch him for signs of rapid ventricular rate, although he is doing currently well so far.   4.  In terms of his anticoagulation, he is back on Eliquis now.  It seems like this was held at the time of discharge from previous hospitalization in January due to some episode of hemoptysis.   5.  Episode of MSSA bacteremia in December.  The patient completed a course of antibiotics.  He is currently on Augmentin due to recent COPD exacerbation.  His CT chest done recently, 5 days ago, showed that the area of consolidation from before had much improved.  There was just a small area left on the CT scan, hence I will continue him on IV ceftriaxone at this time.  Would continue him on Augmentin at the time of discharge.      6.  Physical deconditioning.      7.  Chronic kidney disease.  Monitor his creatinine; it appears to be at his baseline.      8.  The patient might need some insulin if he has steroid related hyperglycemia, for which we will watch his blood sugars.      9.  Continue him on his baseline dose of furosemide, Flomax, trazodone.      10.  Deep vein thrombosis prophylaxis, on Eliquis.      CODE STATUS:  Full code.      Plan of care was discussed with the patient and all of his questions have been answered at length.  He is comfortable with the plan for admission and agrees with it.      CHIEF COMPLAINT:  Shortness of breath.      HISTORY OF PRESENT ILLNESS:  This is a very pleasant 68-year-old gentleman who comes to the hospital with concerns for breathing issues.  He has had 4 hospital admission in the last 6 months for his COPD and breathing issues.  He was just in the hospital for a COPD flare-up and was 
discharged home yesterday.  The patient states that after going home he was initially doing okay, but subsequently had worsening of his symptoms.  He could not identify any particular triggers.  He said he had more wheezing, increased shortness of breath.  Nothing at home was helping him and hence he came back to the ER today.  He states that he is feeling better now after receiving some nebulizers in the ER.  He denies any fevers at home, no vomiting, no chest pains.  No other complaints are voiced.      He did have a mildly lactic acidosis of 2.3, although this was felt to be due to demand supply mismatch ischemia and also due to his nebulizers with his respiratory distress.  He is currently requiring supplemental oxygen.      PHYSICAL EXAMINATION:   VITAL SIGNS:  Blood pressure is 146/83, heart rate is 80, respiration rate is 20, oxygen is 96% on 2 liters oxygen.  He was initially as low as 86%.   GENERAL APPEARANCE:  Middle-aged male who appears older than stated age, lying in the bed.   HEENT:  Normocephalic, atraumatic.   NECK:  Supple.   CARDIOVASCULAR:  He has a regular rate and rhythm at this time.  Normal S1, S2.  He has a systolic murmur.   LUNGS:  He has extensive bilateral wheezing with modest air entry.  Mild accessory muscle use.   GASTROINTESTINAL:  The abdomen is soft, nontender, nondistended, positive bowel sounds.   EXTREMITIES:  Warm and dry.   MUSCULOSKELETAL:  No acute joint synovitis, trace edema, moving all extremities.   NEUROLOGIC:  He is awake, alert and oriented x 3, no gross focal deficits.  Speech is clear.   PSYCHIATRIC:  Pleasant and cooperative.  Appropriate affect.      LABORATORY DATA AND IMAGING:  All lab and images in the last 24 hours have been reviewed.     Past Medical History    I have reviewed this patient's medical history and updated it with pertinent information if needed.   Past Medical History:   Diagnosis Date     Atrial flutter (H)      COPD (chronic obstructive 
pulmonary disease) (H)      Diverticulitis      Hypertension      Persistent atrial fibrillation (H) 4/28/09    ablation 7/1/2015     Premature beats      PVC (premature ventricular contraction)     s/p ablation 12/5/2017     Tricuspid valve disorder     Dr Aj, Hx of valve repair      Ventricular tachycardia (H)     nonsustained       Past Surgical History   I have reviewed this patient's surgical history and updated it with pertinent information if needed.  Past Surgical History:   Procedure Laterality Date     ABDOMEN SURGERY      hernia repair right     APPENDECTOMY       CARDIOVERSION  06/03/2009    successful for afib     CARDIOVERSION  4/20/15    successful for afib     CARDIOVERSION  5/28/15     H ABLATION ATRIAL FLUTTER       H ABLATION FOCAL AFIB  7/1/15    Left atrial linear ablation and pulmonary vein antrum ablation     H ABLATION PVC  12/5/16     INCISION AND DRAINAGE LOWER EXTREMITY, COMBINED Right 11/10/2016    Procedure: COMBINED INCISION AND DRAINAGE LOWER EXTREMITY;  Surgeon: Roger Pacheco MD;  Location: [facility] OR     LAPAROSCOPIC CHOLECYSTECTOMY  1/6/2012    Procedure:LAPAROSCOPIC CHOLECYSTECTOMY; LAPAROSCOPIC CHOLECYSTECTOMY ; Surgeon:ROGER PACHECO; Location:[facility] OR     ORTHOPEDIC SURGERY      Left hip replacement     REPAIR VALVE TRICUSPID  9/8/08    conversion to a bileaflet valve and application of a 30 mm Sanderson ring     SMALL INTESTINE SURGERY      had bowel obstruction age 9     VALVE REPLACEMENT      tricuspid       Prior to Admission Medications   Prior to Admission Medications   Prescriptions Last Dose Informant Patient Reported? Taking?   albuterol (VENTOLIN HFA) 108 (90 Base) MCG/ACT inhaler Past Month at Unknown time  No Yes   Sig: Inhale 1-2 puffs into the lungs every 4 hours (while awake) PRN   amoxicillin-clavulanate (AUGMENTIN) 875-125 MG tablet 2/16/2019 at am  No No   Sig: Take 1 tablet by mouth 2 times daily for 7 days   apixaban ANTICOAGULANT (ELIQUIS) 5 MG 
tablet 2019 at am  Yes No   Sig: Take 1 tablet (5 mg) by mouth 2 times daily   diltiazem ER COATED BEADS (CARDIZEM CD/CARTIA XT) 180 MG 24 hr capsule 2019 at am  Yes No   Sig: Take 180 mg by mouth 2 times daily   furosemide (LASIX) 20 MG tablet 2019 at am  Yes No   Sig: Take 20 mg by mouth 2 times daily   ipratropium - albuterol 0.5 mg/2.5 mg/3 mL (DUONEB) 0.5-2.5 (3) MG/3ML neb solution 2019 at Unknown time  Yes Yes   Sig: Take 1 vial by nebulization every 6 hours as needed for shortness of breath / dyspnea or wheezing   levalbuterol (XOPENEX) 1.25 MG/3ML neb solution Past Week at Unknown time  No Yes   Sig: Take 3 mLs (1.25 mg) by nebulization every 4 hours as needed for wheezing or shortness of breath / dyspnea   order for DME   No No   Sig: Oxygen for nocturnal use. 1 LPM via nasal cannula  Testing done at home in chronic stable state.  Condition is COPD.  Patient does not have Obstructive Sleep Apnea.  Overnight oximetry revealed 30.3 minutes of hypoxia (<= 88%).  Duration: Lifetime (99 months).   order for DME   No No   Sig: Equipment being ordered: Nebulizer   oxyCODONE (OXY-IR) 5 MG capsule 2/15/2019 at Unknown time  Yes Yes   Sig: Take 5 mg by mouth every 4 hours as needed for severe pain   predniSONE (DELTASONE) 10 MG tablet 2019 at 20 mg first dose  No Yes   Si tabs daily for 2 days, then 3 tabs daily for 2 days, then 2 tabs daily for 2 days, then 1 tab daily for 2 days, then stop.   propafenone (RYTHMOL) 225 MG tablet 2019 at am  No No   Sig: Take 1 tablet (225 mg) by mouth every 8 hours   tamsulosin (FLOMAX) 0.4 MG capsule Past Week at Unknown time  Yes Yes   Sig: Take 0.4 mg by mouth daily   traZODone (DESYREL) 50 MG tablet 2/15/2019 at hs  Yes No   Sig: Take 50 mg by mouth At Bedtime       Facility-Administered Medications: None     Allergies   Allergies   Allergen Reactions     Amlodipine Swelling     Flecainide Palpitations       Social History   I have reviewed 
this patient's social history and updated it with pertinent information if needed.   Social History     Socioeconomic History     Marital status:      Spouse name: Not on file     Number of children: Not on file     Years of education: Not on file     Highest education level: Not on file   Social Needs     Financial resource strain: Not on file     Food insecurity - worry: Not on file     Food insecurity - inability: Not on file     Transportation needs - medical: Not on file     Transportation needs - non-medical: Not on file   Occupational History     Not on file   Tobacco Use     Smoking status: Former Smoker     Last attempt to quit: 2008     Years since quittin.1     Smokeless tobacco: Never Used   Substance and Sexual Activity     Alcohol use: Yes     Alcohol/week: 0.0 oz     Comment: 3-6 per month     Drug use: No     Sexual activity: No     Partners: Female     Birth control/protection: None   Other Topics Concern      Service Not Asked     Blood Transfusions Not Asked     Caffeine Concern No     Comment: 1 a day      Occupational Exposure Not Asked     Hobby Hazards Not Asked     Sleep Concern Yes     Comment: nocturia     Stress Concern Not Asked     Weight Concern No     Special Diet No     Back Care Not Asked     Exercise No     Bike Helmet Yes     Seat Belt Yes     Self-Exams Not Asked     Parent/sibling w/ CABG, MI or angioplasty before 65F 55M? No   Social History Narrative     Not on file       Family History   I have reviewed this patient's family history and updated it with pertinent information if needed.   Family History   Problem Relation Age of Onset     Prostate Cancer Father      Family History Negative Mother      Emphysema Mother      Hypertension Mother      Cerebrovascular Disease Mother        Review of Systems   The 10 point Review of Systems is negative other than noted in the HPI or here.     Physical Exam     Data   Data reviewed today:  I personally reviewed 
    Lab data and imaging results from today have been reviewed.     Recent Labs   Lab 19  1259  19  1209   WBC 10.4   < > 15.1*   HGB 14.8   < > 16.9   MCV 94   < > 93      < > 118*      < > 139   POTASSIUM 4.4   < > 4.8   CHLORIDE 100   < > 104   CO2 33*   < > 26   BUN 32*   < > 32*   CR 1.07   < > 1.11   ANIONGAP 6   < > 9   WILBERT 9.7   < > 9.6   *   < > 139*   TROPI 0.018  --  0.017    < > = values in this interval not displayed.       Recent Results (from the past 24 hour(s))   XR Chest Port 1 View    Narrative    XR CHEST PORT 1 VW 2019 1:44 PM    HISTORY: Shortness of breath.     COMPARISON: 2019.       Impression    IMPRESSION: Mild streaky atelectasis or scarring of the right lung  base, similar to prior exam. No new focal pulmonary opacities. Midline  sternal wires as before. No pleural effusions or pneumothorax. Stable  heart size.     MD SHERICE ROSARIO MD             D: 2019   T: 2019   MT: FABIEN      Name:     FELIPE AYOUB   MRN:      [id number]        Account:      [id number]   :      1950        Admitted:     2019                   Document: [id number]       cc: Ana Pratt MD     
Be able to participate in activities despite lingering anxiety/panic

## 2022-12-14 NOTE — BH INPATIENT PSYCHIATRY PROGRESS NOTE - NSTXCONDUCGOAL_PSY_ALL_CORE
Will comply with unit rules
Will describe and accept responsibility for 1 problem behavior
Will describe and accept responsibility for 1 problem behavior
Will comply with unit rules
Will describe and accept responsibility for 1 problem behavior

## 2022-12-14 NOTE — BH INPATIENT PSYCHIATRY PROGRESS NOTE - PRN MEDS
MEDICATIONS  (PRN):  haloperidol     Tablet 5 milliGRAM(s) Oral every 6 hours PRN agitation  haloperidol    Injectable 5 milliGRAM(s) IntraMuscular Once PRN agitation  LORazepam   Injectable 2 milliGRAM(s) IntraMuscular Once PRN Agitation  nicotine  Polacrilex Gum 4 milliGRAM(s) Oral every 3 hours PRN nicotine cravings  QUEtiapine 50 milliGRAM(s) Oral at bedtime PRN mood  sodium chloride 0.65% Nasal 1 Spray(s) Both Nostrils three times a day PRN Nasal Congestion

## 2022-12-14 NOTE — BH INPATIENT PSYCHIATRY PROGRESS NOTE - NSBHCHARTREVIEWVS_PSY_A_CORE FT
Vital Signs Last 24 Hrs  T(C): 36.2 (12-14-22 @ 07:00), Max: 36.4 (12-13-22 @ 17:30)  T(F): 97.2 (12-14-22 @ 07:00), Max: 97.5 (12-13-22 @ 17:30)  HR: --  BP: --  BP(mean): --  RR: --  SpO2: --    Orthostatic VS  12-14-22 @ 07:00  Lying BP: --/-- HR: --  Sitting BP: 116/74 HR: 82  Standing BP: 105/68 HR: 94  Site: --  Mode: --  Orthostatic VS  12-13-22 @ 08:41  Lying BP: --/-- HR: --  Sitting BP: 104/70 HR: 85  Standing BP: 90/50 HR: 94  Site: --  Mode: --  Orthostatic VS  12-12-22 @ 18:48  Lying BP: --/-- HR: --  Sitting BP: 118/66 HR: 109  Standing BP: 98/74 HR: 131  Site: --  Mode: --

## 2023-05-08 NOTE — ED ADULT NURSE NOTE - NS_SISCREENINGSR_GEN_ALL_ED
[de-identified] : Patient was seen with his daughter who helped relay his history.  Patient reports about 3-4 weeks patient had C/P took 2 nitroglycerin tablets under his tongue on Friday mid day - the following day he developed lip and tongue swelling - the symptoms worsened over the following two days - daughter took him to the ER at [facility] - admitted x 24 hours - stopped nitroglycerin and he continued with Losartan   The patient was administered Pepcid and IV Solumedrol.   His symptoms gradually lessened over the 24 hours. \par \par Patient reports milder episodes of lip swelling 1x per week since he had started the Losartan   He treated his symptoms with topical medications. \par \par Patient previously was treated with enalapril x 15 years - he had an MI 12/21 - medication was changed to Losartan at that time. \par \par Medications:\par \par Metoprolol - 2 years\par Crestor - 2 years\par ASA - 2 years \par Humalog - 2 years - insulin dependent x 30 years\par Repatha - 3 months \par Mounjaro insulin - just started 
Negative

## 2023-06-19 ENCOUNTER — APPOINTMENT (OUTPATIENT)
Dept: OBGYN | Facility: CLINIC | Age: 28
End: 2023-06-19
Payer: MEDICARE

## 2023-06-19 VITALS
BODY MASS INDEX: 39.27 KG/M2 | WEIGHT: 230 LBS | SYSTOLIC BLOOD PRESSURE: 115 MMHG | HEIGHT: 64 IN | DIASTOLIC BLOOD PRESSURE: 70 MMHG

## 2023-06-19 DIAGNOSIS — Z01.419 ENCOUNTER FOR GYNECOLOGICAL EXAMINATION (GENERAL) (ROUTINE) W/OUT ABNORMAL FINDINGS: ICD-10-CM

## 2023-06-19 DIAGNOSIS — Z12.4 ENCOUNTER FOR SCREENING FOR MALIGNANT NEOPLASM OF CERVIX: ICD-10-CM

## 2023-06-19 PROCEDURE — G0101: CPT

## 2023-06-19 PROCEDURE — 99395 PREV VISIT EST AGE 18-39: CPT | Mod: GY

## 2023-06-19 NOTE — HISTORY OF PRESENT ILLNESS
[FreeTextEntry1] : 26 yo onjunel. Has mense  happy on this puill. Her mom  of breast cancer in her 30s. ? genetic testing. \par Pt smokes 1-2 paks a day I disc that she should mostlikely not a safe thing with the combined pill . disc  [Currently Active] : currently active [Men] : men [Vaginal] : vaginal [No] : No

## 2023-06-19 NOTE — DISCUSSION/SUMMARY
[FreeTextEntry1] : change pill to meño b because of smoking, , obesity, and estrogen. \par begin meño b monitor cycles. \par call if any prob\par rtn for genetic tsting

## 2023-06-20 LAB — HPV HIGH+LOW RISK DNA PNL CVX: NOT DETECTED

## 2023-06-21 ENCOUNTER — APPOINTMENT (OUTPATIENT)
Dept: OBGYN | Facility: CLINIC | Age: 28
End: 2023-06-21
Payer: MEDICARE

## 2023-06-21 VITALS
HEART RATE: 75 BPM | BODY MASS INDEX: 39.27 KG/M2 | RESPIRATION RATE: 14 BRPM | WEIGHT: 230 LBS | SYSTOLIC BLOOD PRESSURE: 110 MMHG | HEIGHT: 64 IN | DIASTOLIC BLOOD PRESSURE: 60 MMHG

## 2023-06-21 DIAGNOSIS — Z71.83 ENCOUNTER FOR NONPROCREATIVE GENETIC COUNSELING: ICD-10-CM

## 2023-06-21 DIAGNOSIS — Z13.71 ENCOUNTER FOR NONPROCREATIVE GENETIC COUNSELING: ICD-10-CM

## 2023-06-21 PROCEDURE — 36415 COLL VENOUS BLD VENIPUNCTURE: CPT

## 2023-06-21 PROCEDURE — 99213 OFFICE O/P EST LOW 20 MIN: CPT | Mod: 25

## 2023-06-21 NOTE — HISTORY OF PRESENT ILLNESS
[FreeTextEntry1] : pt here for genetic testing. Her Mother was dx with breast cancer at 28 and  at 34. Pt has obtained her Mother's genetic testing and she was BRCA2 positive. pt would like to have genetic testing done today. pt was on junel 1/20 however Hayley suggested she change to POP because she was a smoker. pt states she quit smoking yesterday. She still has a few packs of Junel.

## 2023-06-21 NOTE — DISCUSSION/SUMMARY
[FreeTextEntry1] : Will do single site genetic testing and fu results.\par I am ok with her taking junel 1/20, and explained especially if she happens to carry BRCA2 mtation, in order to decrease her ovarian cancer risk.

## 2023-07-03 LAB — CYTOLOGY CVX/VAG DOC THIN PREP: ABNORMAL

## 2023-07-19 ENCOUNTER — APPOINTMENT (OUTPATIENT)
Dept: OBGYN | Facility: CLINIC | Age: 28
End: 2023-07-19
Payer: MEDICARE

## 2023-07-19 VITALS
DIASTOLIC BLOOD PRESSURE: 60 MMHG | HEART RATE: 74 BPM | HEIGHT: 64 IN | RESPIRATION RATE: 15 BRPM | WEIGHT: 233 LBS | SYSTOLIC BLOOD PRESSURE: 110 MMHG | BODY MASS INDEX: 39.78 KG/M2

## 2023-07-19 PROCEDURE — 99214 OFFICE O/P EST MOD 30 MIN: CPT

## 2023-07-20 NOTE — HISTORY OF PRESENT ILLNESS
[FreeTextEntry1] : Pt here to discuss genetic testing which shows +BRCA2 (Inherited from mother).\par Pt has appt with Dr. Allan on 9/18 secondary to complicated cyst in breast.\par SHe is on junel 1/20 for bc. She stopped smoking one month ago.

## 2023-07-20 NOTE — PLAN
[FreeTextEntry1] : Extensive discussion about BRCA2 + including increased risks of breast, ovarian, pancreatic and melanoma.\par She is an only child.\par Mothers siblings have been tested.\par Pt referred to genetic counselor for further discussion.\par BSE discussed\par CBE q 6-12 months\par MRI starting at 26. Rx given with rx for xanax per request.\par Consider B mastectomy in future: can discuss with breast surgeon\par Continue TRAVIS for ovarian cancer risk reduction (She was inquiring about IUD but no ova ca risk reduction with IUD)\par Pancreatic cancer screening at 50\par yearly derm exams and eye exams for melanoma\par Copy of testing given to patient.

## 2023-08-15 ENCOUNTER — APPOINTMENT (OUTPATIENT)
Dept: MRI IMAGING | Facility: CLINIC | Age: 28
End: 2023-08-15
Payer: MEDICARE

## 2023-08-15 ENCOUNTER — OUTPATIENT (OUTPATIENT)
Dept: OUTPATIENT SERVICES | Facility: HOSPITAL | Age: 28
LOS: 1 days | End: 2023-08-15
Payer: MEDICARE

## 2023-08-15 DIAGNOSIS — Z15.01 GENETIC SUSCEPTIBILITY TO MALIGNANT NEOPLASM OF BREAST: ICD-10-CM

## 2023-08-15 PROCEDURE — C8937: CPT

## 2023-08-15 PROCEDURE — A9585: CPT

## 2023-08-15 PROCEDURE — C8908: CPT

## 2023-08-15 PROCEDURE — 77049 MRI BREAST C-+ W/CAD BI: CPT | Mod: 26

## 2023-08-22 ENCOUNTER — TRANSCRIPTION ENCOUNTER (OUTPATIENT)
Age: 28
End: 2023-08-22

## 2023-09-05 ENCOUNTER — APPOINTMENT (OUTPATIENT)
Dept: OBGYN | Facility: CLINIC | Age: 28
End: 2023-09-05
Payer: MEDICARE

## 2023-09-05 DIAGNOSIS — N92.1 EXCESSIVE AND FREQUENT MENSTRUATION WITH IRREGULAR CYCLE: ICD-10-CM

## 2023-09-05 PROCEDURE — 99213 OFFICE O/P EST LOW 20 MIN: CPT | Mod: 95

## 2023-09-05 RX ORDER — NORETHINDRONE 0.35 MG/1
0.35 TABLET ORAL DAILY
Qty: 84 | Refills: 3 | Status: DISCONTINUED | COMMUNITY
Start: 2023-06-19 | End: 2023-09-05

## 2023-09-05 NOTE — HISTORY OF PRESENT ILLNESS
[FreeTextEntry1] : Telehealth to discuss BTB on Junel 1/20 for past few months, sometimes bleeds x 2 weeks lightly. She admits to missing and skipping pills and wants to know what else she can do for BC that is easier to remember.  Pt has been dx recently with BRCA2 mutation. She had a normal breast MRI 8/15/2023, an appt with breast surgeon in October, and has appt at [facility] [place] in January for RISE program.

## 2023-09-05 NOTE — PLAN
[FreeTextEntry1] : recommend changing to nuva ring for easier compliance while maintaining ov ca risk reduction. fu 6 months

## 2023-10-05 ENCOUNTER — APPOINTMENT (OUTPATIENT)
Dept: BREAST CENTER | Facility: CLINIC | Age: 28
End: 2023-10-05
Payer: MEDICARE

## 2023-10-05 VITALS
HEART RATE: 80 BPM | BODY MASS INDEX: 40.12 KG/M2 | SYSTOLIC BLOOD PRESSURE: 121 MMHG | WEIGHT: 235 LBS | DIASTOLIC BLOOD PRESSURE: 84 MMHG | HEIGHT: 64 IN

## 2023-10-05 DIAGNOSIS — Z80.42 FAMILY HISTORY OF MALIGNANT NEOPLASM OF PROSTATE: ICD-10-CM

## 2023-10-05 DIAGNOSIS — Z80.9 FAMILY HISTORY OF MALIGNANT NEOPLASM, UNSPECIFIED: ICD-10-CM

## 2023-10-05 DIAGNOSIS — Z80.3 FAMILY HISTORY OF MALIGNANT NEOPLASM OF BREAST: ICD-10-CM

## 2023-10-05 DIAGNOSIS — Z56.0 UNEMPLOYMENT, UNSPECIFIED: ICD-10-CM

## 2023-10-05 DIAGNOSIS — N60.02 SOLITARY CYST OF LEFT BREAST: ICD-10-CM

## 2023-10-05 DIAGNOSIS — Z78.9 OTHER SPECIFIED HEALTH STATUS: ICD-10-CM

## 2023-10-05 DIAGNOSIS — Z91.89 OTHER SPECIFIED PERSONAL RISK FACTORS, NOT ELSEWHERE CLASSIFIED: ICD-10-CM

## 2023-10-05 PROCEDURE — 99205 OFFICE O/P NEW HI 60 MIN: CPT

## 2023-10-05 RX ORDER — NALTREXONE HYDROCHLORIDE 50 MG/1
50 TABLET, FILM COATED ORAL
Qty: 30 | Refills: 0 | Status: DISCONTINUED | COMMUNITY
Start: 2020-10-03 | End: 2023-10-05

## 2023-10-05 RX ORDER — LITHIUM CARBONATE 450 MG/1
450 TABLET ORAL
Refills: 0 | Status: DISCONTINUED | COMMUNITY
End: 2023-10-05

## 2023-10-05 RX ORDER — NORETHINDRONE ACETATE AND ETHINYL ESTRADIOL 1; 20 MG/1; UG/1
1-20 TABLET ORAL DAILY
Qty: 90 | Refills: 1 | Status: DISCONTINUED | COMMUNITY
Start: 2022-06-13 | End: 2023-10-05

## 2023-10-05 RX ORDER — ALPRAZOLAM 0.5 MG/1
0.5 TABLET ORAL
Qty: 2 | Refills: 0 | Status: DISCONTINUED | COMMUNITY
Start: 2023-07-19 | End: 2023-10-05

## 2023-10-05 RX ORDER — PEN NEEDLE, DIABETIC 32 GX 1/4"
32G X 6 MM NEEDLE, DISPOSABLE MISCELLANEOUS
Qty: 100 | Refills: 0 | Status: DISCONTINUED | COMMUNITY
Start: 2020-09-05 | End: 2023-10-05

## 2023-10-05 RX ORDER — AZELASTINE HYDROCHLORIDE 137 UG/1
137 SPRAY, METERED NASAL TWICE DAILY
Qty: 1 | Refills: 1 | Status: DISCONTINUED | COMMUNITY
Start: 2022-04-02 | End: 2023-10-05

## 2023-10-05 RX ORDER — AZITHROMYCIN 250 MG/1
250 TABLET, FILM COATED ORAL
Qty: 1 | Refills: 0 | Status: DISCONTINUED | COMMUNITY
Start: 2022-04-29 | End: 2023-10-05

## 2023-10-05 RX ORDER — PALIPERIDONE 6 MG/1
6 TABLET, EXTENDED RELEASE ORAL
Refills: 0 | Status: DISCONTINUED | COMMUNITY
End: 2023-10-05

## 2023-10-05 RX ORDER — LAMOTRIGINE 200 MG/1
200 TABLET ORAL
Refills: 0 | Status: DISCONTINUED | COMMUNITY
End: 2023-10-05

## 2023-10-05 RX ORDER — ORAL SEMAGLUTIDE 7 MG/1
7 TABLET ORAL
Qty: 30 | Refills: 0 | Status: DISCONTINUED | COMMUNITY
Start: 2020-09-26 | End: 2023-10-05

## 2023-10-05 RX ORDER — ARIPIPRAZOLE 15 MG/1
15 TABLET ORAL
Refills: 0 | Status: ACTIVE | COMMUNITY

## 2023-10-05 RX ORDER — ASCORBIC ACID 500 MG
TABLET ORAL
Refills: 0 | Status: ACTIVE | COMMUNITY

## 2023-10-05 RX ORDER — ETONOGESTREL AND ETHINYL ESTRADIOL 11.7; 2.7 MG/1; MG/1
0.12-0.015 INSERT, EXTENDED RELEASE VAGINAL
Qty: 1 | Refills: 1 | Status: DISCONTINUED | COMMUNITY
Start: 2023-09-05 | End: 2023-10-05

## 2023-10-05 SDOH — ECONOMIC STABILITY - INCOME SECURITY: UNEMPLOYMENT, UNSPECIFIED: Z56.0

## 2023-10-19 ENCOUNTER — APPOINTMENT (OUTPATIENT)
Dept: INTERNAL MEDICINE | Facility: CLINIC | Age: 28
End: 2023-10-19
Payer: MEDICARE

## 2023-10-19 VITALS
TEMPERATURE: 97.9 F | WEIGHT: 236 LBS | SYSTOLIC BLOOD PRESSURE: 116 MMHG | OXYGEN SATURATION: 97 % | DIASTOLIC BLOOD PRESSURE: 78 MMHG | HEART RATE: 81 BPM | BODY MASS INDEX: 40.29 KG/M2 | HEIGHT: 64 IN

## 2023-10-19 DIAGNOSIS — Z87.09 PERSONAL HISTORY OF OTHER DISEASES OF THE RESPIRATORY SYSTEM: ICD-10-CM

## 2023-10-19 DIAGNOSIS — Z00.00 ENCOUNTER FOR GENERAL ADULT MEDICAL EXAMINATION W/OUT ABNORMAL FINDINGS: ICD-10-CM

## 2023-10-19 DIAGNOSIS — E55.9 VITAMIN D DEFICIENCY, UNSPECIFIED: ICD-10-CM

## 2023-10-19 PROCEDURE — G0439: CPT

## 2023-10-22 PROBLEM — Z87.09 HISTORY OF ACUTE SINUSITIS: Status: RESOLVED | Noted: 2022-04-29 | Resolved: 2023-10-22

## 2023-10-22 PROBLEM — E55.9 VITAMIN D INSUFFICIENCY: Status: ACTIVE | Noted: 2021-11-18

## 2023-10-22 PROBLEM — Z00.00 ANNUAL PHYSICAL EXAM: Status: ACTIVE | Noted: 2023-10-22

## 2023-10-23 ENCOUNTER — APPOINTMENT (OUTPATIENT)
Dept: HEMATOLOGY ONCOLOGY | Facility: CLINIC | Age: 28
End: 2023-10-23
Payer: MEDICARE

## 2023-10-23 PROCEDURE — 99205 OFFICE O/P NEW HI 60 MIN: CPT | Mod: 95

## 2023-12-04 ENCOUNTER — APPOINTMENT (OUTPATIENT)
Dept: INTERNAL MEDICINE | Facility: CLINIC | Age: 28
End: 2023-12-04
Payer: MEDICARE

## 2023-12-04 VITALS
SYSTOLIC BLOOD PRESSURE: 122 MMHG | HEIGHT: 64 IN | DIASTOLIC BLOOD PRESSURE: 76 MMHG | OXYGEN SATURATION: 97 % | WEIGHT: 239 LBS | HEART RATE: 72 BPM | TEMPERATURE: 97.3 F | BODY MASS INDEX: 40.8 KG/M2

## 2023-12-04 PROCEDURE — 99213 OFFICE O/P EST LOW 20 MIN: CPT

## 2023-12-07 ENCOUNTER — NON-APPOINTMENT (OUTPATIENT)
Age: 28
End: 2023-12-07

## 2023-12-07 LAB
ALBUMIN SERPL ELPH-MCNC: 4.5 G/DL
ALP BLD-CCNC: 41 U/L
ALT SERPL-CCNC: 13 U/L
ANION GAP SERPL CALC-SCNC: 12 MMOL/L
AST SERPL-CCNC: 19 U/L
BASOPHILS # BLD AUTO: 0.04 K/UL
BASOPHILS NFR BLD AUTO: 0.5 %
BILIRUB SERPL-MCNC: 0.3 MG/DL
BUN SERPL-MCNC: 9 MG/DL
CALCIUM SERPL-MCNC: 9.9 MG/DL
CHLORIDE SERPL-SCNC: 102 MMOL/L
CO2 SERPL-SCNC: 24 MMOL/L
CREAT SERPL-MCNC: 0.81 MG/DL
EGFR: 102 ML/MIN/1.73M2
EOSINOPHIL # BLD AUTO: 0.04 K/UL
EOSINOPHIL NFR BLD AUTO: 0.5 %
GLUCOSE SERPL-MCNC: 78 MG/DL
HCT VFR BLD CALC: 45 %
HGB BLD-MCNC: 14.9 G/DL
IMM GRANULOCYTES NFR BLD AUTO: 0.3 %
LYMPHOCYTES # BLD AUTO: 2.49 K/UL
LYMPHOCYTES NFR BLD AUTO: 33.2 %
MAN DIFF?: NORMAL
MCHC RBC-ENTMCNC: 31.4 PG
MCHC RBC-ENTMCNC: 33.1 GM/DL
MCV RBC AUTO: 94.7 FL
MONOCYTES # BLD AUTO: 0.52 K/UL
MONOCYTES NFR BLD AUTO: 6.9 %
NEUTROPHILS # BLD AUTO: 4.38 K/UL
NEUTROPHILS NFR BLD AUTO: 58.6 %
PLATELET # BLD AUTO: 355 K/UL
POTASSIUM SERPL-SCNC: 4.5 MMOL/L
PROT SERPL-MCNC: 7.8 G/DL
RBC # BLD: 4.75 M/UL
RBC # FLD: 12.5 %
SODIUM SERPL-SCNC: 137 MMOL/L
TSH SERPL-ACNC: 1.2 UIU/ML
WBC # FLD AUTO: 7.49 K/UL

## 2023-12-07 RX ORDER — LAMOTRIGINE 200 MG/1
200 TABLET ORAL
Refills: 0 | Status: ACTIVE | COMMUNITY

## 2023-12-11 ENCOUNTER — APPOINTMENT (OUTPATIENT)
Dept: INTERNAL MEDICINE | Facility: CLINIC | Age: 28
End: 2023-12-11
Payer: MEDICARE

## 2023-12-11 PROCEDURE — 98960 EDU&TRN PT SELF-MGMT NQHP 1: CPT

## 2023-12-18 ENCOUNTER — APPOINTMENT (OUTPATIENT)
Dept: OBGYN | Facility: CLINIC | Age: 28
End: 2023-12-18

## 2023-12-22 ENCOUNTER — RX RENEWAL (OUTPATIENT)
Age: 28
End: 2023-12-22

## 2024-01-08 ENCOUNTER — APPOINTMENT (OUTPATIENT)
Dept: INTERNAL MEDICINE | Facility: CLINIC | Age: 29
End: 2024-01-08
Payer: MEDICARE

## 2024-01-08 VITALS
TEMPERATURE: 98.5 F | HEIGHT: 64 IN | DIASTOLIC BLOOD PRESSURE: 80 MMHG | OXYGEN SATURATION: 99 % | WEIGHT: 235 LBS | SYSTOLIC BLOOD PRESSURE: 110 MMHG | BODY MASS INDEX: 40.12 KG/M2 | HEART RATE: 98 BPM

## 2024-01-08 PROCEDURE — 99213 OFFICE O/P EST LOW 20 MIN: CPT

## 2024-01-08 PROCEDURE — G2211 COMPLEX E/M VISIT ADD ON: CPT

## 2024-01-08 NOTE — HISTORY OF PRESENT ILLNESS
[de-identified] : ZHEN GALICIA is a 28 year F who presents today for weigh-in on Wegovy. She has received a short course of the medication- however her insurance indicated that it will no longer cover the medication. She is requesting Phentermine- she has used it in the past with some success. She also reports that she started to see a nutritionist to help her with diet.

## 2024-02-07 ENCOUNTER — APPOINTMENT (OUTPATIENT)
Dept: INTERNAL MEDICINE | Facility: CLINIC | Age: 29
End: 2024-02-07
Payer: MEDICARE

## 2024-02-07 VITALS
HEIGHT: 64 IN | TEMPERATURE: 97.8 F | HEART RATE: 92 BPM | WEIGHT: 233 LBS | DIASTOLIC BLOOD PRESSURE: 78 MMHG | OXYGEN SATURATION: 97 % | BODY MASS INDEX: 39.78 KG/M2 | SYSTOLIC BLOOD PRESSURE: 118 MMHG

## 2024-02-07 DIAGNOSIS — E66.9 OBESITY, UNSPECIFIED: ICD-10-CM

## 2024-02-07 PROCEDURE — G2211 COMPLEX E/M VISIT ADD ON: CPT

## 2024-02-07 PROCEDURE — 99212 OFFICE O/P EST SF 10 MIN: CPT

## 2024-02-07 RX ORDER — PHENTERMINE HYDROCHLORIDE 15 MG/1
15 CAPSULE ORAL DAILY
Qty: 30 | Refills: 0 | Status: DISCONTINUED | COMMUNITY
Start: 2024-01-08 | End: 2024-02-07

## 2024-02-12 PROBLEM — E66.9 OBESITY: Status: ACTIVE | Noted: 2023-12-04

## 2024-02-12 NOTE — HEALTH RISK ASSESSMENT
[Medical reason not done] : Medical reason not done [de-identified] : Patient carries a pre-existing dx of Bipolar -1 [Never] : Never

## 2024-02-12 NOTE — HISTORY OF PRESENT ILLNESS
[de-identified] : ZHEN GALICIA is a 28 year F who presents today for follow up of her weight management issue. Her insurance did not cover GLP1 therapy. She requested a trial on phentermine. She reports she stopped taking the Phentermine shortly after starting it. She says it interfered with her psychiatric medications and made her feel more edgy and irritable. After discussions with her psychiatrist, they stopped the weightloss medication. She now is asking for Naltrexone. I don't think the weightloss with Naltrexone will be significant. When in combination therapy with Wellbutrin the efficacy is only 4-5 % of body weight. In her case, we are talking about 10-12 lbs at best.  Finally, this medication will also have psychiatric effects.

## 2024-02-24 ENCOUNTER — RX RENEWAL (OUTPATIENT)
Age: 29
End: 2024-02-24

## 2024-03-06 ENCOUNTER — APPOINTMENT (OUTPATIENT)
Dept: INTERNAL MEDICINE | Facility: CLINIC | Age: 29
End: 2024-03-06
Payer: MEDICARE

## 2024-03-06 VITALS
TEMPERATURE: 97.3 F | DIASTOLIC BLOOD PRESSURE: 68 MMHG | BODY MASS INDEX: 39.61 KG/M2 | OXYGEN SATURATION: 98 % | SYSTOLIC BLOOD PRESSURE: 122 MMHG | HEIGHT: 64 IN | WEIGHT: 232 LBS | HEART RATE: 98 BPM

## 2024-03-06 DIAGNOSIS — Z11.1 ENCOUNTER FOR SCREENING FOR RESPIRATORY TUBERCULOSIS: ICD-10-CM

## 2024-03-06 PROCEDURE — 99213 OFFICE O/P EST LOW 20 MIN: CPT

## 2024-03-06 PROCEDURE — G2211 COMPLEX E/M VISIT ADD ON: CPT

## 2024-03-09 PROBLEM — Z11.1 SCREENING-PULMONARY TB: Status: RESOLVED | Noted: 2020-10-19 | Resolved: 2024-03-06

## 2024-03-09 NOTE — HISTORY OF PRESENT ILLNESS
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 She has previously done this type of work and will need screening for TB and clearance that her psychiatric co-morbidities will not interfere with job responsibilities. Her chart was reviewed-documented is her dx of Bipolar disorder- there is no reported instances of violent, aggressive, or erratic behavior.

## 2024-03-09 NOTE — PHYSICAL EXAM
[Normal Outer Ear/Nose] : the outer ears and nose were normal in appearance [No JVD] : no jugular venous distention [Normal] : normal rate, regular rhythm, normal S1 and S2 and no murmur heard [No Focal Deficits] : no focal deficits [Memory Grossly Normal] : memory grossly normal [Speech Grossly Normal] : speech grossly normal [Normal Affect] : the affect was normal [Normal Mood] : the mood was normal

## 2024-03-11 LAB
M TB IFN-G BLD-IMP: NEGATIVE
QUANTIFERON TB PLUS MITOGEN MINUS NIL: >10 IU/ML
QUANTIFERON TB PLUS NIL: 0.03 IU/ML
QUANTIFERON TB PLUS TB1 MINUS NIL: 0 IU/ML
QUANTIFERON TB PLUS TB2 MINUS NIL: 0.01 IU/ML

## 2024-04-04 ENCOUNTER — APPOINTMENT (OUTPATIENT)
Dept: BREAST CENTER | Facility: CLINIC | Age: 29
End: 2024-04-04
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
## 2024-04-22 ENCOUNTER — APPOINTMENT (OUTPATIENT)
Dept: OBGYN | Facility: CLINIC | Age: 29
End: 2024-04-22
Payer: MEDICARE

## 2024-04-22 VITALS
BODY MASS INDEX: 38.58 KG/M2 | DIASTOLIC BLOOD PRESSURE: 68 MMHG | SYSTOLIC BLOOD PRESSURE: 116 MMHG | HEIGHT: 64 IN | WEIGHT: 226 LBS

## 2024-04-22 DIAGNOSIS — Z15.09 GENETIC SUSCEPTIBILITY TO MALIGNANT NEOPLASM OF BREAST: ICD-10-CM

## 2024-04-22 DIAGNOSIS — Z30.41 ENCOUNTER FOR SURVEILLANCE OF CONTRACEPTIVE PILLS: ICD-10-CM

## 2024-04-22 DIAGNOSIS — Z15.01 GENETIC SUSCEPTIBILITY TO MALIGNANT NEOPLASM OF BREAST: ICD-10-CM

## 2024-04-22 PROCEDURE — 99213 OFFICE O/P EST LOW 20 MIN: CPT

## 2024-04-22 RX ORDER — PSYLLIUM HUSK 0.4 G
CAPSULE ORAL
Refills: 0 | Status: DISCONTINUED | COMMUNITY
End: 2024-04-22

## 2024-04-22 RX ORDER — TRAZODONE HYDROCHLORIDE 300 MG/1
TABLET ORAL
Refills: 0 | Status: DISCONTINUED | COMMUNITY
End: 2024-04-22

## 2024-04-22 RX ORDER — FERROUS GLUCONATE 256(28)MG
TABLET ORAL
Refills: 0 | Status: DISCONTINUED | COMMUNITY
End: 2024-04-22

## 2024-04-22 RX ORDER — NORETHINDRONE ACETATE AND ETHINYL ESTRADIOL AND FERROUS FUMARATE 1MG-20(21)
1-20 KIT ORAL
Qty: 3 | Refills: 1 | Status: ACTIVE | COMMUNITY
Start: 1900-01-01 | End: 1900-01-01

## 2024-04-22 RX ORDER — LITHIUM CARBONATE 600 MG/1
CAPSULE ORAL
Refills: 0 | Status: DISCONTINUED | COMMUNITY
End: 2024-04-22

## 2024-04-22 RX ORDER — SEMAGLUTIDE 0.68 MG/ML
2 INJECTION, SOLUTION SUBCUTANEOUS
Qty: 1 | Refills: 0 | Status: DISCONTINUED | COMMUNITY
Start: 2023-12-07 | End: 2024-04-22

## 2024-04-22 NOTE — DISCUSSION/SUMMARY
[FreeTextEntry1] : recommend tvus and ca 125 but pt does not want to do today. States she will at annual exam.

## 2024-04-22 NOTE — HISTORY OF PRESENT ILLNESS
[FreeTextEntry1] : Pt on junel 1/20. Nuva ring was not covered. She has not had btb on ocp because she is taking it qd now. BRCA2+: still not smoking, has seen breast surgeon, genetic counselor, will be having breast MRI on August 5th.

## 2024-04-23 ENCOUNTER — APPOINTMENT (OUTPATIENT)
Dept: INTERNAL MEDICINE | Facility: CLINIC | Age: 29
End: 2024-04-23
Payer: MEDICARE

## 2024-04-23 VITALS
BODY MASS INDEX: 38.24 KG/M2 | HEIGHT: 64 IN | OXYGEN SATURATION: 98 % | DIASTOLIC BLOOD PRESSURE: 78 MMHG | SYSTOLIC BLOOD PRESSURE: 120 MMHG | WEIGHT: 224 LBS | HEART RATE: 102 BPM | TEMPERATURE: 97.5 F

## 2024-04-23 DIAGNOSIS — F31.9 BIPOLAR DISORDER, UNSPECIFIED: ICD-10-CM

## 2024-04-23 DIAGNOSIS — H10.30 UNSPECIFIED ACUTE CONJUNCTIVITIS, UNSPECIFIED EYE: ICD-10-CM

## 2024-04-23 PROCEDURE — 99213 OFFICE O/P EST LOW 20 MIN: CPT

## 2024-04-23 RX ORDER — POLYMYXIN B SULFATE AND TRIMETHOPRIM 10000; 1 [USP'U]/ML; MG/ML
10000-0.1 SOLUTION OPHTHALMIC 4 TIMES DAILY
Qty: 1 | Refills: 0 | Status: ACTIVE | COMMUNITY
Start: 2024-04-23 | End: 1900-01-01

## 2024-04-23 NOTE — PHYSICAL EXAM
[PERRL] : pupils equal round and reactive to light [EOMI] : extraocular movements intact [Normal] : normal rate, regular rhythm, normal S1 and S2 and no murmur heard [Normal Supraclavicular Nodes] : no supraclavicular lymphadenopathy [Normal Posterior Cervical Nodes] : no posterior cervical lymphadenopathy [Normal Anterior Cervical Nodes] : no anterior cervical lymphadenopathy [de-identified] : left eye: injected conjunctiva, + chemosis.- no discharge, ptn was constantly wiping her eye nervously- right eye: nl [de-identified] : very anxious

## 2024-04-23 NOTE — HISTORY OF PRESENT ILLNESS
[de-identified] : 28 yrs old female with PMHx of bipolar disease, presents with left eye redness since this am, ptn states her eye was crusted shut, later in the day had yellow discharge. not really itchy. 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 no fever, no chills, no ST.

## 2024-04-23 NOTE — ASSESSMENT
[FreeTextEntry1] : 1- Left eye conjunctivitis- prob bacterial- will give trimethoprim-polymyxin ophthalmic soln- ptn does not wear contact lenses. proper hygiene. cannot continuously touch eye and go to work. advised ptn to stop rubbing her eye.  2-Bipolar disease-on Lamictal, Wellbutrin and ability- FU with psych.

## 2024-04-23 NOTE — REVIEW OF SYSTEMS
[Discharge] : discharge [Redness] : redness [Anxiety] : anxiety [Negative] : Neurological [Pain] : no pain [Dryness] : no dryness  [Vision Problems] : no vision problems [Itching] : no itching

## 2024-06-06 ENCOUNTER — APPOINTMENT (OUTPATIENT)
Dept: INTERNAL MEDICINE | Facility: CLINIC | Age: 29
End: 2024-06-06
Payer: MEDICARE

## 2024-06-06 VITALS
TEMPERATURE: 97.7 F | OXYGEN SATURATION: 98 % | SYSTOLIC BLOOD PRESSURE: 100 MMHG | HEIGHT: 64 IN | HEART RATE: 115 BPM | WEIGHT: 228 LBS | DIASTOLIC BLOOD PRESSURE: 80 MMHG | BODY MASS INDEX: 38.93 KG/M2

## 2024-06-06 DIAGNOSIS — F41.9 ANXIETY DISORDER, UNSPECIFIED: ICD-10-CM

## 2024-06-06 PROCEDURE — G2211 COMPLEX E/M VISIT ADD ON: CPT

## 2024-06-06 PROCEDURE — 99213 OFFICE O/P EST LOW 20 MIN: CPT

## 2024-06-08 PROBLEM — F41.9 ANXIETY: Status: ACTIVE | Noted: 2024-06-08

## 2024-06-08 RX ORDER — BUPROPION HYDROCHLORIDE 150 MG/1
150 TABLET, FILM COATED ORAL
Refills: 0 | Status: DISCONTINUED | COMMUNITY
End: 2024-06-08

## 2024-06-08 RX ORDER — BUPROPION HYDROCHLORIDE 300 MG/1
300 TABLET, EXTENDED RELEASE ORAL
Qty: 30 | Refills: 0 | Status: ACTIVE | COMMUNITY
Start: 2024-06-05

## 2024-06-08 NOTE — PHYSICAL EXAM
[Normal] : no acute distress, well nourished, well developed and well-appearing [Normal Sclera/Conjunctiva] : normal sclera/conjunctiva [Normal Outer Ear/Nose] : the outer ears and nose were normal in appearance [No Respiratory Distress] : no respiratory distress  [No Accessory Muscle Use] : no accessory muscle use [Normal Rate] : normal rate  [Coordination Grossly Intact] : coordination grossly intact [Speech Grossly Normal] : speech grossly normal [Alert and Oriented x3] : oriented to person, place, and time

## 2024-06-08 NOTE — HISTORY OF PRESENT ILLNESS
[FreeTextEntry8] : ZHEN GALICIA is a 28 year F who presents today with complaints of acute anxiety. She has a long history of Bipolar disorder and is managed on multiple medications by Psychiatry. 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 She placed a call to her mental health provider earlier today, but had not heard back from her. She did speak with the provider yesterday-however she did not mention her increased anxiety surrounding her job.   We talked about summer jobs and arrangements that she would need to make.

## 2024-09-09 ENCOUNTER — APPOINTMENT (OUTPATIENT)
Dept: BREAST CENTER | Facility: CLINIC | Age: 29
End: 2024-09-09
Payer: MEDICARE

## 2024-09-09 VITALS
SYSTOLIC BLOOD PRESSURE: 116 MMHG | HEART RATE: 109 BPM | HEIGHT: 64 IN | BODY MASS INDEX: 35.85 KG/M2 | WEIGHT: 210 LBS | DIASTOLIC BLOOD PRESSURE: 83 MMHG

## 2024-09-09 DIAGNOSIS — Z91.89 OTHER SPECIFIED PERSONAL RISK FACTORS, NOT ELSEWHERE CLASSIFIED: ICD-10-CM

## 2024-09-09 DIAGNOSIS — R92.8 OTHER ABNORMAL AND INCONCLUSIVE FINDINGS ON DIAGNOSTIC IMAGING OF BREAST: ICD-10-CM

## 2024-09-09 DIAGNOSIS — Z15.01 GENETIC SUSCEPTIBILITY TO MALIGNANT NEOPLASM OF BREAST: ICD-10-CM

## 2024-09-09 DIAGNOSIS — R21 RASH AND OTHER NONSPECIFIC SKIN ERUPTION: ICD-10-CM

## 2024-09-09 DIAGNOSIS — Z15.09 GENETIC SUSCEPTIBILITY TO MALIGNANT NEOPLASM OF BREAST: ICD-10-CM

## 2024-09-09 PROCEDURE — 99214 OFFICE O/P EST MOD 30 MIN: CPT

## 2024-09-09 RX ORDER — NORETHINDRONE ACETATE AND ETHINYL ESTRADIOL AND FERROUS FUMARATE 1MG-20(21)
KIT ORAL
Refills: 0 | Status: ACTIVE | COMMUNITY

## 2024-09-09 RX ORDER — NYSTATIN 100000 1/G
100000 POWDER TOPICAL
Qty: 1 | Refills: 1 | Status: ACTIVE | COMMUNITY
Start: 2024-09-09 | End: 1900-01-01

## 2024-09-09 RX ORDER — NYSTATIN AND TRIAMCINOLONE ACETONIDE 100000; 1 [USP'U]/G; MG/G
100000-0.1 OINTMENT TOPICAL TWICE DAILY
Qty: 1 | Refills: 1 | Status: ACTIVE | COMMUNITY
Start: 2024-09-09 | End: 1900-01-01

## 2024-09-09 NOTE — PAST MEDICAL HISTORY
[Menarche Age ____] : age at menarche was [unfilled] [Definite ___ (Date)] : the last menstrual period was [unfilled] [Total Preg ___] : G[unfilled] [FreeTextEntry7] : Yes

## 2024-09-09 NOTE — CONSULT LETTER
[Dear  ___] : Dear  [unfilled], [Courtesy Letter:] : I had the pleasure of seeing your patient, [unfilled], in my office today. [Please see my note below.] : Please see my note below. [Consult Closing:] : Thank you very much for allowing me to participate in the care of this patient.  If you have any questions, please do not hesitate to contact me. [Sincerely,] : Sincerely, [FreeTextEntry3] : Anita Allan MD FACS  [DrJohn  ___] : Dr. BIRMINGHAM

## 2024-09-09 NOTE — PHYSICAL EXAM
[Normocephalic] : normocephalic [Atraumatic] : atraumatic [Sclera nonicteric] : sclera nonicteric [Supple] : supple [No Supraclavicular Adenopathy] : no supraclavicular adenopathy [No Cervical Adenopathy] : no cervical adenopathy [Clear to Auscultation Bilat] : clear to auscultation bilaterally [Normal Sinus Rhythm] : normal sinus rhythm [Examined in the supine and seated position] : examined in the supine and seated position [Bra Size: ___] : Bra Size: [unfilled] [No dominant masses] : no dominant masses in right breast  [No dominant masses] : no dominant masses left breast [No Nipple Retraction] : no left nipple retraction [No Nipple Discharge] : no left nipple discharge [No Axillary Lymphadenopathy] : no left axillary lymphadenopathy [No Edema] : no edema [No Ulceration] : no ulceration [Grade 2] : Ptosis Grade 2 [Breast Nipple Inversion] : nipples not inverted [Breast Nipple Retraction] : nipples not retracted [Breast Nipple Flattening] : nipples not flattened [Breast Nipple Fissures] : nipples not fissured [de-identified] : Small punctate erythematous lesion in UOQ likely from insect bite [de-identified] : Left inframammary rash

## 2024-09-09 NOTE — HISTORY OF PRESENT ILLNESS
[FreeTextEntry1] : Ms Magdaleno is a 28 year-old woman who presents for a follow-up for a high risk for breast cancer and an abnormal finding on her breast MRI. She is asymptomatic in the breasts at the moment except for a bug bite in the left upper outer breast. This bite was present and the size of a nickel at the time of the MRI, and is better now. She had quit smoking around February but restarted smoking about 1 ppd for the past 2 weeks.   Her genetic testing is positive for a BRCA2 mutation. Her family history is significant for breast cancer in the mother at 29 and two maternal aunts in their 60's.

## 2024-09-09 NOTE — ASSESSMENT
[FreeTextEntry1] : Ms. Magdaleno is a 28 year old woman with a BRCA2 mutation and enhancement in the left breast on MRI. Her breast exam today is without suspicious findings. A script for Nystatin is sent to her pharmacy for the inframammary rash. The report and CD of her MRI imaging will be obtained. A left limited US is recommended and ordered for a 2nd look at the area of MRI enhancement. Ms. Magdaleno is interested in risk reducing mastectomy given that her mother was diagnosed at 29 and she is wary of breast MRI imaging. She has not yet met with the Plastic Surgeon; she is again referred to the Plastic Surgeon, and a consultation will be arranged. We discussed the use of a stage approach with oncoplastic reduction and mastopexy prior to the mastectomy, and she is interested in that especially as she wishes to have nipple sparing mastectomy. The increased risk of complications with smoking is reviewed, including the risk of nipple areola necrosis after nipple sparing mastectomy. Tobacco cessation is recommended, and she states that she will quit after the current pack. Our nurse navigator will also reach out to her regarding resources for tobacco cessation. I would like to see her back for a follow-up in 6 months, unless she has had surgery. She understands and is comfortable with the plan. She is encouraged to call if any questions or concerns arise.

## 2024-09-09 NOTE — DATA REVIEWED
[FreeTextEntry1] : I have independently reviewed the reports and the images.   B/l US 6/23/23 - 0.4 cm complicated cyst in L breast, stable - BIRADS 2   Breast MRI 8/15/23 - BIRADS 1   Breast MRI 8/19/2024 We will obtain images and report pending

## 2024-09-10 ENCOUNTER — RX RENEWAL (OUTPATIENT)
Age: 29
End: 2024-09-10

## 2024-09-10 RX ORDER — NORETHINDRONE ACETATE AND ETHINYL ESTRADIOL AND FERROUS FUMARATE 1MG-20(21)
1-20 KIT ORAL
Qty: 28 | Refills: 0 | Status: ACTIVE | COMMUNITY
Start: 2024-09-10 | End: 1900-01-01

## 2024-09-23 ENCOUNTER — APPOINTMENT (OUTPATIENT)
Dept: OBGYN | Facility: CLINIC | Age: 29
End: 2024-09-23

## 2024-09-23 ENCOUNTER — NON-APPOINTMENT (OUTPATIENT)
Age: 29
End: 2024-09-23

## 2024-09-23 VITALS
WEIGHT: 211 LBS | HEART RATE: 80 BPM | HEIGHT: 64 IN | SYSTOLIC BLOOD PRESSURE: 120 MMHG | DIASTOLIC BLOOD PRESSURE: 74 MMHG | RESPIRATION RATE: 16 BRPM | BODY MASS INDEX: 36.02 KG/M2

## 2024-09-23 DIAGNOSIS — Z01.419 ENCOUNTER FOR GYNECOLOGICAL EXAMINATION (GENERAL) (ROUTINE) W/OUT ABNORMAL FINDINGS: ICD-10-CM

## 2024-09-23 DIAGNOSIS — B00.9 HERPESVIRAL INFECTION, UNSPECIFIED: ICD-10-CM

## 2024-09-23 PROCEDURE — 99395 PREV VISIT EST AGE 18-39: CPT | Mod: GY

## 2024-09-23 PROCEDURE — G0101: CPT

## 2024-09-23 RX ORDER — LUMATEPERONE 21 MG/1
21 CAPSULE ORAL
Refills: 0 | Status: ACTIVE | COMMUNITY
Start: 2024-09-23

## 2024-09-23 RX ORDER — VALACYCLOVIR 1 G/1
1 TABLET, FILM COATED ORAL TWICE DAILY
Qty: 30 | Refills: 0 | Status: ACTIVE | COMMUNITY
Start: 2024-09-23 | End: 1900-01-01

## 2024-09-23 NOTE — HISTORY OF PRESENT ILLNESS
[TextBox_4] : BRCA 2+, mother dx with breast cancer at 29 years old. Followed by breast surgeon and has been getting breast MRI's. She is planning on getting prophylactic bilateral mastectomy and reconstruction this year. On OCP for ovarian cancer risk reduction. Still smoking, has nicotine patch on currently. dx for herpes at 17 and gets occasional mild herpes outbreaks and desires tx for this. [Previously active] : previously active

## 2024-09-25 LAB — CYTOLOGY CVX/VAG DOC THIN PREP: ABNORMAL

## 2024-10-21 PROBLEM — Z42.1 ENCOUNTER FOR BREAST RECONSTRUCTION FOLLOWING MASTECTOMY: Status: ACTIVE | Noted: 2024-10-21

## 2024-10-22 ENCOUNTER — APPOINTMENT (OUTPATIENT)
Dept: PLASTIC SURGERY | Facility: CLINIC | Age: 29
End: 2024-10-22
Payer: MEDICARE

## 2024-10-22 VITALS
OXYGEN SATURATION: 97 % | DIASTOLIC BLOOD PRESSURE: 81 MMHG | SYSTOLIC BLOOD PRESSURE: 124 MMHG | TEMPERATURE: 97.7 F | HEART RATE: 81 BPM | BODY MASS INDEX: 35 KG/M2 | HEIGHT: 64 IN | WEIGHT: 205 LBS

## 2024-10-22 DIAGNOSIS — Z15.01 GENETIC SUSCEPTIBILITY TO MALIGNANT NEOPLASM OF BREAST: ICD-10-CM

## 2024-10-22 DIAGNOSIS — Z15.09 GENETIC SUSCEPTIBILITY TO MALIGNANT NEOPLASM OF BREAST: ICD-10-CM

## 2024-10-22 DIAGNOSIS — Z42.1 ENCOUNTER FOR BREAST RECONSTRUCTION FOLLOWING MASTECTOMY: ICD-10-CM

## 2024-10-22 DIAGNOSIS — Z91.89 OTHER SPECIFIED PERSONAL RISK FACTORS, NOT ELSEWHERE CLASSIFIED: ICD-10-CM

## 2024-10-22 PROCEDURE — 99205 OFFICE O/P NEW HI 60 MIN: CPT

## 2024-11-04 ENCOUNTER — NON-APPOINTMENT (OUTPATIENT)
Age: 29
End: 2024-11-04

## 2024-11-04 DIAGNOSIS — R92.8 OTHER ABNORMAL AND INCONCLUSIVE FINDINGS ON DIAGNOSTIC IMAGING OF BREAST: ICD-10-CM

## 2024-11-12 ENCOUNTER — APPOINTMENT (OUTPATIENT)
Dept: INTERNAL MEDICINE | Facility: CLINIC | Age: 29
End: 2024-11-12
Payer: MEDICARE

## 2024-11-12 VITALS
OXYGEN SATURATION: 98 % | DIASTOLIC BLOOD PRESSURE: 64 MMHG | BODY MASS INDEX: 35.68 KG/M2 | HEIGHT: 64 IN | TEMPERATURE: 98 F | HEART RATE: 79 BPM | WEIGHT: 209 LBS | SYSTOLIC BLOOD PRESSURE: 110 MMHG

## 2024-11-12 DIAGNOSIS — Z71.6 TOBACCO ABUSE COUNSELING: ICD-10-CM

## 2024-11-12 DIAGNOSIS — Z72.0 TOBACCO USE: ICD-10-CM

## 2024-11-12 PROCEDURE — 99213 OFFICE O/P EST LOW 20 MIN: CPT | Mod: 25

## 2024-11-12 PROCEDURE — G2211 COMPLEX E/M VISIT ADD ON: CPT

## 2024-11-12 PROCEDURE — 99407 BEHAV CHNG SMOKING > 10 MIN: CPT

## 2024-11-12 RX ORDER — NICOTINE 21 MG/24HR
14 PATCH, TRANSDERMAL 24 HOURS TRANSDERMAL DAILY
Qty: 30 | Refills: 0 | Status: ACTIVE | COMMUNITY
Start: 2024-11-12 | End: 1900-01-01

## 2024-11-12 RX ORDER — VARENICLINE TARTRATE 1 MG/561
1 TABLET, FILM COATED ORAL TWICE DAILY
Qty: 1 | Refills: 2 | Status: ACTIVE | COMMUNITY
Start: 2024-11-12 | End: 1900-01-01

## 2024-11-12 RX ORDER — VARENICLINE TARTRATE 0.5 (11)-1
0.5 MG X 11 & KIT ORAL
Qty: 1 | Refills: 0 | Status: ACTIVE | COMMUNITY
Start: 2024-11-12 | End: 1900-01-01

## 2024-12-02 NOTE — ED ADULT NURSE NOTE - NS ED NURSE RECORD ANOTHER VITAL SIGN
Per chart review, pt has refills available at pharmacy, however, refills are for 15 days at current dose. RX sent on 09/03/2024.    Per LOV on 11/11/2024, notes indicate increase GBP, last RX sent on 09/03/2024 for one tablet 3 times daily, per notes on medication in EPIC patient taking differently take 2 capsules morning noon and night.    Pended new RX for provider review and signature. Pended 600 mg tablet and added note to pharmacy to confirm with patient this is a higher dosage tablet.   Yes

## 2024-12-25 PROBLEM — F10.90 ALCOHOL USE: Status: ACTIVE | Noted: 2023-10-05

## 2025-02-11 ENCOUNTER — APPOINTMENT (OUTPATIENT)
Dept: PLASTIC SURGERY | Facility: CLINIC | Age: 30
End: 2025-02-11

## 2025-03-24 ENCOUNTER — APPOINTMENT (OUTPATIENT)
Dept: OBGYN | Facility: CLINIC | Age: 30
End: 2025-03-24
Payer: MEDICARE

## 2025-03-24 DIAGNOSIS — Z15.09 GENETIC SUSCEPTIBILITY TO MALIGNANT NEOPLASM OF BREAST: ICD-10-CM

## 2025-03-24 DIAGNOSIS — Z15.01 GENETIC SUSCEPTIBILITY TO MALIGNANT NEOPLASM OF BREAST: ICD-10-CM

## 2025-03-24 DIAGNOSIS — Z30.41 ENCOUNTER FOR SURVEILLANCE OF CONTRACEPTIVE PILLS: ICD-10-CM

## 2025-03-24 PROCEDURE — 99213 OFFICE O/P EST LOW 20 MIN: CPT | Mod: 2W

## 2025-03-31 ENCOUNTER — APPOINTMENT (OUTPATIENT)
Dept: ULTRASOUND IMAGING | Facility: CLINIC | Age: 30
End: 2025-03-31

## 2025-03-31 ENCOUNTER — OUTPATIENT (OUTPATIENT)
Dept: OUTPATIENT SERVICES | Facility: HOSPITAL | Age: 30
LOS: 1 days | End: 2025-03-31
Payer: MEDICARE

## 2025-03-31 DIAGNOSIS — Z15.01 GENETIC SUSCEPTIBILITY TO MALIGNANT NEOPLASM OF BREAST: ICD-10-CM

## 2025-03-31 PROCEDURE — 76830 TRANSVAGINAL US NON-OB: CPT

## 2025-03-31 PROCEDURE — 76856 US EXAM PELVIC COMPLETE: CPT | Mod: 26

## 2025-03-31 PROCEDURE — 76830 TRANSVAGINAL US NON-OB: CPT | Mod: 26

## 2025-03-31 PROCEDURE — 76856 US EXAM PELVIC COMPLETE: CPT

## 2025-06-18 NOTE — BH INPATIENT PSYCHIATRY ASSESSMENT NOTE - CURRENT PASSIVE IDEATION:
[TextEntry] : General: AAO, no significant distress Psychiatric: affect pleasant and within normal limits Eyes: relatively symmetric, no obvious nystagmus Skin: no significant lesions on face Nose: no significant lesions; patent. Oral Cavity & Oropharynx: no significant deformity or lesions Neck/Lymphatics: no significant masses or abnormal cervical nodes palpated Respiratory: breathing comfortably; no significant distress Neurologic: cranial nerves II-XII grossly intact; EOMI Facial function: symmetric   Ear examination performed under binocular otologic microscope: Left Ear External: Pinna and periauricular area is normal. Canal: Ear canal skin is not inflamed or edematous. Tympanic Membrane: Intact and in good position.   Right Ear External: Pinna and periauricular area is normal. Canal: Ear canal skin is not inflamed or edematous. Tympanic Membrane: Intact and in good position.  Bilateral TMJs with clicking and tender to palpation
None known

## 2025-07-02 ENCOUNTER — APPOINTMENT (OUTPATIENT)
Dept: INTERNAL MEDICINE | Facility: CLINIC | Age: 30
End: 2025-07-02
Payer: MEDICARE

## 2025-07-02 VITALS
OXYGEN SATURATION: 98 % | TEMPERATURE: 97.2 F | DIASTOLIC BLOOD PRESSURE: 64 MMHG | WEIGHT: 215 LBS | BODY MASS INDEX: 36.7 KG/M2 | HEART RATE: 64 BPM | SYSTOLIC BLOOD PRESSURE: 116 MMHG | HEIGHT: 64 IN

## 2025-07-02 PROBLEM — Z01.84 ANTIBODY RESPONSE EXAMINATION: Status: ACTIVE | Noted: 2025-07-02

## 2025-07-02 PROBLEM — Z11.1 SCREENING-PULMONARY TB: Status: ACTIVE | Noted: 2020-10-19

## 2025-07-02 PROBLEM — F17.200 CURRENT EVERY DAY SMOKER: Status: ACTIVE | Noted: 2025-07-02

## 2025-07-02 PROCEDURE — 99213 OFFICE O/P EST LOW 20 MIN: CPT

## 2025-07-02 PROCEDURE — G2211 COMPLEX E/M VISIT ADD ON: CPT

## 2025-07-03 LAB
MEV IGG FLD QL IA: 30.6 AU/ML
MEV IGG+IGM SER-IMP: POSITIVE
MUV AB SER-ACNC: POSITIVE
MUV IGG SER QL IA: 107 AU/ML
RUBV IGG FLD-ACNC: 3.47 INDEX
RUBV IGG SER-IMP: POSITIVE
VZV AB TITR SER: POSITIVE
VZV IGG SER IF-ACNC: 8.37 S/CO

## 2025-07-05 LAB
M TB IFN-G BLD-IMP: NEGATIVE
QUANTIFERON TB PLUS MITOGEN MINUS NIL: >10 IU/ML
QUANTIFERON TB PLUS NIL: 0.03 IU/ML
QUANTIFERON TB PLUS TB1 MINUS NIL: 0 IU/ML
QUANTIFERON TB PLUS TB2 MINUS NIL: 0 IU/ML

## 2025-07-22 ENCOUNTER — TRANSCRIPTION ENCOUNTER (OUTPATIENT)
Age: 30
End: 2025-07-22

## 2025-09-03 ENCOUNTER — APPOINTMENT (OUTPATIENT)
Dept: OBGYN | Facility: CLINIC | Age: 30
End: 2025-09-03
Payer: MEDICARE

## 2025-09-03 VITALS
HEIGHT: 64 IN | SYSTOLIC BLOOD PRESSURE: 120 MMHG | RESPIRATION RATE: 20 BRPM | DIASTOLIC BLOOD PRESSURE: 74 MMHG | HEART RATE: 68 BPM | BODY MASS INDEX: 37.56 KG/M2 | WEIGHT: 220 LBS

## 2025-09-03 DIAGNOSIS — Z15.09 GENETIC SUSCEPTIBILITY TO MALIGNANT NEOPLASM OF BREAST: ICD-10-CM

## 2025-09-03 DIAGNOSIS — Z15.01 GENETIC SUSCEPTIBILITY TO MALIGNANT NEOPLASM OF BREAST: ICD-10-CM

## 2025-09-03 DIAGNOSIS — Z30.09 ENCOUNTER FOR OTHER GENERAL COUNSELING AND ADVICE ON CONTRACEPTION: ICD-10-CM

## 2025-09-03 PROCEDURE — 99214 OFFICE O/P EST MOD 30 MIN: CPT

## 2025-09-04 ENCOUNTER — APPOINTMENT (OUTPATIENT)
Dept: INTERNAL MEDICINE | Facility: CLINIC | Age: 30
End: 2025-09-04

## 2025-09-08 ENCOUNTER — APPOINTMENT (OUTPATIENT)
Dept: INTERNAL MEDICINE | Facility: CLINIC | Age: 30
End: 2025-09-08
Payer: COMMERCIAL

## 2025-09-08 VITALS
SYSTOLIC BLOOD PRESSURE: 118 MMHG | HEIGHT: 64 IN | DIASTOLIC BLOOD PRESSURE: 76 MMHG | TEMPERATURE: 97.3 F | HEART RATE: 78 BPM | BODY MASS INDEX: 37.56 KG/M2 | WEIGHT: 220 LBS | OXYGEN SATURATION: 100 %

## 2025-09-08 DIAGNOSIS — M79.10 MYALGIA, UNSPECIFIED SITE: ICD-10-CM

## 2025-09-08 PROCEDURE — 99202 OFFICE O/P NEW SF 15 MIN: CPT

## 2025-09-08 PROCEDURE — G2211 COMPLEX E/M VISIT ADD ON: CPT | Mod: NC

## 2025-09-11 RX ORDER — SEGESTERONE ACETATE AND ETHINYL ESTRADIOL 17.4; 103 MG/1; MG/1
0.013-0.15 RING VAGINAL
Qty: 1 | Refills: 0 | Status: ACTIVE | COMMUNITY
Start: 2025-09-03

## 2025-09-14 PROBLEM — M79.10 MUSCLE ACHE: Status: ACTIVE | Noted: 2025-09-14
